# Patient Record
Sex: FEMALE | Race: WHITE | NOT HISPANIC OR LATINO | Employment: OTHER | URBAN - NONMETROPOLITAN AREA
[De-identification: names, ages, dates, MRNs, and addresses within clinical notes are randomized per-mention and may not be internally consistent; named-entity substitution may affect disease eponyms.]

---

## 2022-02-24 ENCOUNTER — OFFICE VISIT (OUTPATIENT)
Dept: OBSTETRICS AND GYNECOLOGY | Facility: CLINIC | Age: 62
End: 2022-02-24

## 2022-02-24 VITALS
BODY MASS INDEX: 37.65 KG/M2 | DIASTOLIC BLOOD PRESSURE: 80 MMHG | SYSTOLIC BLOOD PRESSURE: 130 MMHG | HEIGHT: 65 IN | WEIGHT: 226 LBS

## 2022-02-24 DIAGNOSIS — Z12.31 ENCOUNTER FOR SCREENING MAMMOGRAM FOR MALIGNANT NEOPLASM OF BREAST: ICD-10-CM

## 2022-02-24 DIAGNOSIS — N95.0 PMB (POSTMENOPAUSAL BLEEDING): Primary | ICD-10-CM

## 2022-02-24 PROCEDURE — 99203 OFFICE O/P NEW LOW 30 MIN: CPT | Performed by: NURSE PRACTITIONER

## 2022-02-24 PROCEDURE — 88305 TISSUE EXAM BY PATHOLOGIST: CPT | Performed by: NURSE PRACTITIONER

## 2022-02-24 PROCEDURE — 58100 BIOPSY OF UTERUS LINING: CPT | Performed by: NURSE PRACTITIONER

## 2022-03-01 ENCOUNTER — HOSPITAL ENCOUNTER (OUTPATIENT)
Dept: MAMMOGRAPHY | Facility: HOSPITAL | Age: 62
Discharge: HOME | End: 2022-03-01
Admitting: NURSE PRACTITIONER

## 2022-03-01 DIAGNOSIS — Z12.31 ENCOUNTER FOR SCREENING MAMMOGRAM FOR MALIGNANT NEOPLASM OF BREAST: ICD-10-CM

## 2022-03-01 PROCEDURE — 77067 SCR MAMMO BI INCL CAD: CPT

## 2022-03-01 PROCEDURE — 77063 BREAST TOMOSYNTHESIS BI: CPT

## 2022-03-02 ENCOUNTER — DOCUMENTATION (OUTPATIENT)
Dept: LAB | Facility: HOSPITAL | Age: 62
End: 2022-03-02

## 2022-03-02 DIAGNOSIS — Z80.3 FAMILY HISTORY OF BREAST CANCER IN MALE: Primary | ICD-10-CM

## 2022-03-02 NOTE — PROGRESS NOTES
As part of a high-risk assessment, this patient was provided a questionnaire to assess personal and family history of cancer. Patients who meet National Comprehensive Cancer Network criteria are offered genetic testing for hereditary cancer at their appointment. If this patient qualifies and consents to genetic testing, the results and management recommendations (when applicable) will be provided to the referring provider. A [facility] breast cancer risk assessment was also calculated.  Based upon the patient's answers, the TC score was calcuated as  13.5% . Women with a 20% or higher lifetime risk are recommended to pursue annual breast MRI in addition to annual mammogram, per American Cancer Society recommendations.

## 2022-03-05 LAB
CYTO UR: NORMAL
LAB AP CASE REPORT: NORMAL
LAB AP CLINICAL INFORMATION: NORMAL
PATH REPORT.FINAL DX SPEC: NORMAL
PATH REPORT.GROSS SPEC: NORMAL

## 2022-03-07 ENCOUNTER — LAB (OUTPATIENT)
Dept: LAB | Facility: HOSPITAL | Age: 62
End: 2022-03-07

## 2022-03-07 ENCOUNTER — TELEPHONE (OUTPATIENT)
Dept: OBSTETRICS AND GYNECOLOGY | Facility: CLINIC | Age: 62
End: 2022-03-07

## 2022-03-07 DIAGNOSIS — Z80.3 FAMILY HISTORY OF BREAST CANCER IN MALE: ICD-10-CM

## 2022-03-09 DIAGNOSIS — C54.1 ENDOMETRIAL CARCINOMA: Primary | ICD-10-CM

## 2022-03-14 ENCOUNTER — TELEPHONE (OUTPATIENT)
Dept: GYNECOLOGIC ONCOLOGY | Age: 62
End: 2022-03-14

## 2022-03-14 NOTE — TELEPHONE ENCOUNTER
Caller: ISAIAH JOHNSON    Relationship to patient: SELF    Best call back number: [phone]    Chief complaint: PT STATES SHE HAS A THREE HOUR DRIVE TO THE OFFICE, AND ASKS IF IT IS POSSIBLE THAT HER APPT BE DONE AS A VIRTUAL VISIT    Type of visit: NEW PT APPT    Requested date: SAME DATE 03/16 AS VIRTUAL VISIT    If rescheduling, when is the original appointment: 03/16

## 2022-03-16 ENCOUNTER — TELEMEDICINE (OUTPATIENT)
Dept: GYNECOLOGIC ONCOLOGY | Facility: CLINIC | Age: 62
End: 2022-03-16

## 2022-03-16 ENCOUNTER — DOCUMENTATION (OUTPATIENT)
Dept: GYNECOLOGIC ONCOLOGY | Facility: CLINIC | Age: 62
End: 2022-03-16

## 2022-03-16 ENCOUNTER — PREP FOR SURGERY (OUTPATIENT)
Dept: OTHER | Facility: HOSPITAL | Age: 62
End: 2022-03-16

## 2022-03-16 VITALS — HEIGHT: 65 IN | WEIGHT: 220 LBS | BODY MASS INDEX: 36.65 KG/M2

## 2022-03-16 DIAGNOSIS — C55 MALIGNANT NEOPLASM OF UTERUS, UNSPECIFIED SITE: Primary | ICD-10-CM

## 2022-03-16 DIAGNOSIS — C54.1 ENDOMETRIAL CARCINOMA: Primary | ICD-10-CM

## 2022-03-16 PROCEDURE — 99442 PR PHYS/QHP TELEPHONE EVALUATION 11-20 MIN: CPT | Performed by: OBSTETRICS & GYNECOLOGY

## 2022-03-16 RX ORDER — SODIUM CHLORIDE 0.9 % (FLUSH) 0.9 %
10 SYRINGE (ML) INJECTION EVERY 12 HOURS SCHEDULED
Status: CANCELLED | OUTPATIENT
Start: 2022-03-16

## 2022-03-16 RX ORDER — SODIUM CHLORIDE 0.9 % (FLUSH) 0.9 %
10 SYRINGE (ML) INJECTION AS NEEDED
Status: CANCELLED | OUTPATIENT
Start: 2022-03-16

## 2022-03-16 RX ORDER — CEFAZOLIN SODIUM 2 G/100ML
2 INJECTION, SOLUTION INTRAVENOUS ONCE
Status: CANCELLED | OUTPATIENT
Start: 2022-03-16 | End: 2022-03-16

## 2022-03-16 RX ORDER — ONDANSETRON 2 MG/ML
4 INJECTION INTRAMUSCULAR; INTRAVENOUS EVERY 6 HOURS PRN
Status: CANCELLED | OUTPATIENT
Start: 2022-03-16

## 2022-03-16 RX ORDER — SODIUM CHLORIDE, SODIUM LACTATE, POTASSIUM CHLORIDE, CALCIUM CHLORIDE 600; 310; 30; 20 MG/100ML; MG/100ML; MG/100ML; MG/100ML
100 INJECTION, SOLUTION INTRAVENOUS CONTINUOUS
Status: CANCELLED | OUTPATIENT
Start: 2022-03-16

## 2022-03-16 NOTE — PROGRESS NOTES
*TELEHEALTH VISIT *     Patient consented to telephone visit for medical care today.   Total time spent reviewing images, labs, discussion and plan was 15 minutes.       Adela Jay D.O  3/16/2022                    Age: 61 y.o.  Sex: female  :  1960  MRN: [id number]     REFERRING PHYSICIAN: DANIEL Salmeron  DATE OF VISIT: 3/16/2022     Rachel Sierra presents to [facility] for evaluation and management of newly diagnosed high grade papillary serous uterine carcinoma. She was having PMB and Maday Morales obtained EMBx for the diagnosis. She denies constitutional symptoms.       Oncology/Hematology History Overview Note   Rachel Sierra is a 61 y.o. female referred by DANIEL Cutler ([place]) for endometrial carcinoma.     • 22: TVUS-uterus-10 cm length, 3.9 cm fundal fibroid, ET-27 mm, ovaries normal.  • 22: EMB-high-grade carcinoma w/ papillary features compatible with endometrial serous carcinoma.            Past Medical History:  No past medical history on file.    Past Surgical History:  Past Surgical History:   Procedure Laterality Date   • BREAST BIOPSY Left     lipoma   • CHOLECYSTECTOMY     • ENDOMETRIAL BIOPSY  2015   • TONSILLECTOMY          MEDICATIONS:    Current Outpatient Medications:   •  cyanocobalamin (VITAMIN B-12) 1000 MCG tablet, Take 1,000 mcg by mouth., Disp: , Rfl:     ALLERGIES:  Allergies   Allergen Reactions   • Honey Anaphylaxis     Anaphylaxis as a child - edema at lips as adult.   • Sulfa Antibiotics Rash and Swelling     Edema throat, hands and face -  Hospitalized for 2 days.   • Bupropion Hives   • Nuts Other (See Comments)     Blood in stools         ROS:  CONSTITUTIONAL:  Denies fever or chills.   NEUROLOGIC:  Denies headache, focal weakness or sensory changes.   EYES:  Denies change in visual acuity.  HEENT:  Denies nasal congestion or sore throat.   RESPIRATORY:  Denies cough or shortness of breath.   CARDIOVASCULAR:   Denies chest pain or edema.   GI:  Denies abdominal pain, nausea, vomiting, bloody stools or diarrhea.   :  Denies dysuria, leaking or incontinence.  MUSCULOSKELETAL:  Denies back pain or joint pain.   INTEGUMENT:  Denies rash.   ENDOCRINE:  Denies polyuria or polydipsia.   LYMPHATIC:  Denies swollen glands or lymphedema.   PSYCHIATRIC:  Denies depression or anxiety.      PHYSICAL EXAM:  There were no vitals filed for this visit.  There is no height or weight on file to calculate BMI.  No flowsheet data found.  PHQ-9 Total Score:           Result Review :  The pertinent labs, images, and/or pathology as noted in the oncology history were reviewed independently and discussed with the patient.   Adela Jay, DO   03/16/2022    [facility] LABS:   No results found for: WBC, RBC, HGB, HCT, PLT  No results found for:     [facility] IMAGING:  Mammo Screening Digital Tomosynthesis Bilateral With CAD    Result Date: 3/10/2022  Additional evaluation of both breasts is recommended with focal spot compression views and true lateral views as detailed above. The images are marked electronically. BI-RADS Category 0, incomplete.  Tyrer-Cuzick lifetime risk score of 13.5 percent. NCCN guidelines for genetic testing is met. American Cancer Society guidelines recommend screening breast MRI for women with estimated lifetime risk of breast cancer greater than 20%. This report was finalized on 03/10/2022 16:33 by Dr. Cristian Medellin MD.            ASSESSMENT :  • UPSC on EMBX 2/24/22          PLAN :    The case was reviewed with the patient in detail, taking into consideration symptoms, laboratory results, imaging, pathology and physical exam findings.  At this point in time, I am recommending Robotic assisted total laparoscopic hysterectomy, bilateral salpingoophorecotmy, sentinel lymph node mapping, possible staging, possible laparotomy.   .     Risks, benefits, alternatives and indications to the procedure were reviewed in  detail.    Risks of surgery include bleeding/hemorrhage which may require transfusion and associated transfusion risk (transfusion reaction, HCV, TRALI ); Infection, which may require antibiotic therapy or abscess drainage; Damage to surrounding internal organs (bowel, bladder, or urinary tract) which may result in obstruction or fistula; Nerve, or vascular injury which may result in numbness, pain, swelling or loss of strength. Risk of possible incomplete resolution of symptoms or failure to cure.  She understands that it is possible that intraoperative findings may warrant further treatment.  There is a low, but real, risk of unanticipated return to the OR for a problem associated with the surgery and a remote possibility of death.      • All questions were addressed and answered to the patient's satisfaction. She indicates understanding of these risks and desires to proceed. She wishes me to use my judgement to do the procedure that I feel is in her best interest. Surgical consents were signed.  •             Adela Jay D.O  3/16/2022    Gynecologic Oncology   [address] [place] [address]  [phone] office

## 2022-03-17 ENCOUNTER — HOSPITAL ENCOUNTER (OUTPATIENT)
Dept: MAMMOGRAPHY | Facility: HOSPITAL | Age: 62
Discharge: HOME | End: 2022-03-17

## 2022-03-17 ENCOUNTER — HOSPITAL ENCOUNTER (OUTPATIENT)
Dept: ULTRASOUND IMAGING | Facility: HOSPITAL | Age: 62
Discharge: HOME | End: 2022-03-17

## 2022-03-17 DIAGNOSIS — R92.8 ABNORMAL MAMMOGRAM: ICD-10-CM

## 2022-03-17 PROCEDURE — 76642 ULTRASOUND BREAST LIMITED: CPT

## 2022-03-17 PROCEDURE — 77066 DX MAMMO INCL CAD BI: CPT

## 2022-03-17 PROCEDURE — G0279 TOMOSYNTHESIS, MAMMO: HCPCS

## 2022-03-18 PROBLEM — C54.1 ENDOMETRIAL CARCINOMA: Status: ACTIVE | Noted: 2022-03-18

## 2022-03-24 ENCOUNTER — DOCUMENTATION (OUTPATIENT)
Dept: LAB | Facility: HOSPITAL | Age: 62
End: 2022-03-24

## 2022-03-24 ENCOUNTER — TELEPHONE (OUTPATIENT)
Dept: GYNECOLOGIC ONCOLOGY | Facility: CLINIC | Age: 62
End: 2022-03-24

## 2022-03-24 NOTE — TELEPHONE ENCOUNTER
CALLED PATIENT AND TOLD HER THAT I SCHEDULED ALL HER PAT IN [place] BUT PATIENT INFORMED ME THAT SHE WOULD BE STAYING IN [place] THE NIGHT BEFORE SURGERY SO SHE COULD HAVE EVERYTHING DONE HERE SINCE SHE WILL BE HERE ANYWAY SO I CALLED AND HAD IT SCHEDULED HERE IN [place] THE DAY BEFORE SURGERY. PATIENT CONFIRMED AND WAS VERY HAPPY TO HAVE EVERYTHING DONE HERE BEFORE SURGERY.

## 2022-04-05 ENCOUNTER — APPOINTMENT (OUTPATIENT)
Dept: PREADMISSION TESTING | Facility: HOSPITAL | Age: 62
End: 2022-04-05

## 2022-04-06 ENCOUNTER — HOSPITAL ENCOUNTER (OUTPATIENT)
Dept: GENERAL RADIOLOGY | Facility: HOSPITAL | Age: 62
Discharge: HOME | End: 2022-04-06

## 2022-04-06 ENCOUNTER — PRE-ADMISSION TESTING (OUTPATIENT)
Dept: PREADMISSION TESTING | Facility: HOSPITAL | Age: 62
End: 2022-04-06

## 2022-04-06 VITALS
HEIGHT: 65 IN | TEMPERATURE: 98.1 F | OXYGEN SATURATION: 98 % | DIASTOLIC BLOOD PRESSURE: 76 MMHG | BODY MASS INDEX: 37.32 KG/M2 | RESPIRATION RATE: 16 BRPM | WEIGHT: 224 LBS | SYSTOLIC BLOOD PRESSURE: 144 MMHG | HEART RATE: 64 BPM

## 2022-04-06 DIAGNOSIS — C54.1 ENDOMETRIAL CARCINOMA: ICD-10-CM

## 2022-04-06 LAB
ABO GROUP BLD: NORMAL
ANION GAP SERPL CALCULATED.3IONS-SCNC: 8 MMOL/L (ref 5–15)
APTT PPP: 32.8 SECONDS (ref 22.7–35.4)
B-HCG UR QL: NEGATIVE
BACTERIA UR QL AUTO: ABNORMAL /HPF
BASOPHILS # BLD AUTO: 0.09 10*3/MM3 (ref 0–0.2)
BASOPHILS NFR BLD AUTO: 1.2 % (ref 0–1.5)
BILIRUB UR QL STRIP: NEGATIVE
BLD GP AB SCN SERPL QL: NEGATIVE
BUN SERPL-MCNC: 16 MG/DL (ref 8–23)
BUN/CREAT SERPL: 19.3 (ref 7–25)
CALCIUM SPEC-SCNC: 9.7 MG/DL (ref 8.6–10.5)
CHLORIDE SERPL-SCNC: 105 MMOL/L (ref 98–107)
CLARITY UR: CLEAR
CO2 SERPL-SCNC: 28 MMOL/L (ref 22–29)
COLOR UR: YELLOW
CREAT SERPL-MCNC: 0.83 MG/DL (ref 0.57–1)
DEPRECATED RDW RBC AUTO: 42.7 FL (ref 37–54)
EGFRCR SERPLBLD CKD-EPI 2021: 79.3 ML/MIN/1.73
EOSINOPHIL # BLD AUTO: 0.41 10*3/MM3 (ref 0–0.4)
EOSINOPHIL NFR BLD AUTO: 5.7 % (ref 0.3–6.2)
ERYTHROCYTE [DISTWIDTH] IN BLOOD BY AUTOMATED COUNT: 13.6 % (ref 12.3–15.4)
GLUCOSE SERPL-MCNC: 81 MG/DL (ref 65–99)
GLUCOSE UR STRIP-MCNC: NEGATIVE MG/DL
HCT VFR BLD AUTO: 46.3 % (ref 34–46.6)
HGB BLD-MCNC: 15.4 G/DL (ref 12–15.9)
HGB UR QL STRIP.AUTO: ABNORMAL
HYALINE CASTS UR QL AUTO: ABNORMAL /LPF
IMM GRANULOCYTES # BLD AUTO: 0.02 10*3/MM3 (ref 0–0.05)
IMM GRANULOCYTES NFR BLD AUTO: 0.3 % (ref 0–0.5)
INR PPP: 1 (ref 0.9–1.1)
KETONES UR QL STRIP: NEGATIVE
LEUKOCYTE ESTERASE UR QL STRIP.AUTO: ABNORMAL
LYMPHOCYTES # BLD AUTO: 2.06 10*3/MM3 (ref 0.7–3.1)
LYMPHOCYTES NFR BLD AUTO: 28.5 % (ref 19.6–45.3)
MCH RBC QN AUTO: 28.4 PG (ref 26.6–33)
MCHC RBC AUTO-ENTMCNC: 33.3 G/DL (ref 31.5–35.7)
MCV RBC AUTO: 85.4 FL (ref 79–97)
MONOCYTES # BLD AUTO: 0.57 10*3/MM3 (ref 0.1–0.9)
MONOCYTES NFR BLD AUTO: 7.9 % (ref 5–12)
NEUTROPHILS NFR BLD AUTO: 4.09 10*3/MM3 (ref 1.7–7)
NEUTROPHILS NFR BLD AUTO: 56.4 % (ref 42.7–76)
NITRITE UR QL STRIP: NEGATIVE
NRBC BLD AUTO-RTO: 0 /100 WBC (ref 0–0.2)
PH UR STRIP.AUTO: 7 [PH] (ref 5–8)
PLATELET # BLD AUTO: 303 10*3/MM3 (ref 140–450)
PMV BLD AUTO: 9.8 FL (ref 6–12)
POTASSIUM SERPL-SCNC: 4.6 MMOL/L (ref 3.5–5.2)
PROT UR QL STRIP: NEGATIVE
PROTHROMBIN TIME: 13.1 SECONDS (ref 11.7–14.2)
QT INTERVAL: 417 MS
RBC # BLD AUTO: 5.42 10*6/MM3 (ref 3.77–5.28)
RBC # UR STRIP: ABNORMAL /HPF
REF LAB TEST METHOD: ABNORMAL
RH BLD: POSITIVE
SARS-COV-2 RNA RESP QL NAA+PROBE: NOT DETECTED
SODIUM SERPL-SCNC: 141 MMOL/L (ref 136–145)
SP GR UR STRIP: 1.01 (ref 1–1.03)
SQUAMOUS #/AREA URNS HPF: ABNORMAL /HPF
T&S EXPIRATION DATE: NORMAL
UROBILINOGEN UR QL STRIP: ABNORMAL
WBC # UR STRIP: ABNORMAL /HPF
WBC NRBC COR # BLD: 7.24 10*3/MM3 (ref 3.4–10.8)

## 2022-04-06 PROCEDURE — 71046 X-RAY EXAM CHEST 2 VIEWS: CPT

## 2022-04-06 PROCEDURE — 93010 ELECTROCARDIOGRAM REPORT: CPT | Performed by: INTERNAL MEDICINE

## 2022-04-06 PROCEDURE — 85730 THROMBOPLASTIN TIME PARTIAL: CPT

## 2022-04-06 PROCEDURE — 81025 URINE PREGNANCY TEST: CPT

## 2022-04-06 PROCEDURE — 86900 BLOOD TYPING SEROLOGIC ABO: CPT

## 2022-04-06 PROCEDURE — 81001 URINALYSIS AUTO W/SCOPE: CPT

## 2022-04-06 PROCEDURE — 93005 ELECTROCARDIOGRAM TRACING: CPT

## 2022-04-06 PROCEDURE — U0003 INFECTIOUS AGENT DETECTION BY NUCLEIC ACID (DNA OR RNA); SEVERE ACUTE RESPIRATORY SYNDROME CORONAVIRUS 2 (SARS-COV-2) (CORONAVIRUS DISEASE [COVID-19]), AMPLIFIED PROBE TECHNIQUE, MAKING USE OF HIGH THROUGHPUT TECHNOLOGIES AS DESCRIBED BY CMS-2020-01-R: HCPCS

## 2022-04-06 PROCEDURE — 85025 COMPLETE CBC W/AUTO DIFF WBC: CPT

## 2022-04-06 PROCEDURE — 80048 BASIC METABOLIC PNL TOTAL CA: CPT

## 2022-04-06 PROCEDURE — 86850 RBC ANTIBODY SCREEN: CPT

## 2022-04-06 PROCEDURE — 86901 BLOOD TYPING SEROLOGIC RH(D): CPT

## 2022-04-06 PROCEDURE — 85610 PROTHROMBIN TIME: CPT

## 2022-04-06 PROCEDURE — 36415 COLL VENOUS BLD VENIPUNCTURE: CPT

## 2022-04-06 PROCEDURE — C9803 HOPD COVID-19 SPEC COLLECT: HCPCS

## 2022-04-06 RX ORDER — FERROUS SULFATE 325(65) MG
325 TABLET ORAL
COMMUNITY
End: 2022-04-27 | Stop reason: ALTCHOICE

## 2022-04-06 NOTE — DISCHARGE INSTRUCTIONS
Take the following medications the morning of surgery: NONE    ARRIVE AT 9:30 AM.      If you are on prescription narcotic pain medication to control your pain you may also take that medication the morning of surgery.    General Instructions:  Do not eat solid food after midnight the night before surgery.  You may drink clear liquids day of surgery but must stop at least one hour before your hospital arrival time. CUTOFF TIME IS 8:30 AM.  It is beneficial for you to have a clear drink that contains carbohydrates the day of surgery.  We suggest a 12 to 20 ounce bottle of Gatorade or Powerade for non-diabetic patients or a 12 to 20 ounce bottle of G2 or Powerade Zero for diabetic patients. (Pediatric patients, are not advised to drink a 12 to 20 ounce carbohydrate drink)    Clear liquids are liquids you can see through.  Nothing red in color.     Plain water                               Sports drinks  Sodas                                   Gelatin (Jell-O)  Fruit juices without pulp such as white grape juice and apple juice  Popsicles that contain no fruit or yogurt  Tea or coffee (no cream or milk added)  Gatorade / Powerade  G2 / Powerade Zero    Patients who avoid smoking, chewing tobacco and alcohol for 4 weeks prior to surgery have a reduced risk of post-operative complications.  Quit smoking as many days before surgery as you can.  Do not smoke, use chewing tobacco or drink alcohol the day of surgery.   If applicable bring your C-PAP/ BI-PAP machine.  Bring any papers given to you in the doctor’s office.  Wear clean comfortable clothes.  Do not wear contact lenses, false eyelashes or make-up.  Bring a case for your glasses.   Bring crutches or walker if applicable.  Remove all piercings.  Leave jewelry and any other valuables at home.  Hair extensions with metal clips must be removed prior to surgery.  The Pre-Admission Testing nurse will instruct you to bring medications if unable to obtain an accurate list  in Pre-Admission Testing.        If you were given a blood bank ID arm band remember to bring it with you the day of surgery.    Preventing a Surgical Site Infection:  For 2 to 3 days before surgery, avoid shaving with a razor because the razor can irritate skin and make it easier to develop an infection.    Any areas of open skin can increase the risk of a post-operative wound infection by allowing bacteria to enter and travel throughout the body.  Notify your surgeon if you have any skin wounds / rashes even if it is not near the expected surgical site.  The area will need assessed to determine if surgery should be delayed until it is healed.  The night prior to surgery shower using a fresh bar of anti-bacterial soap (such as Dial) and clean washcloth.  Sleep in a clean bed with clean clothing.  Do not allow pets to sleep with you.  Shower on the morning of surgery using a fresh bar of anti-bacterial soap (such as Dial) and clean washcloth.  Dry with a clean towel and dress in clean clothing.  Ask your surgeon if you will be receiving antibiotics prior to surgery.  Make sure you, your family, and all healthcare providers clean their hands with soap and water or an alcohol based hand  before caring for you or your wound.    Day of surgery:  Your arrival time is approximately two hours before your scheduled surgery time.  Upon arrival, a Pre-op nurse and Anesthesiologist will review your health history, obtain vital signs, and answer questions you may have.  The only belongings needed at this time will be a list of your home medications and if applicable your C-PAP/BI-PAP machine.  A Pre-op nurse will start an IV and you may receive medication in preparation for surgery, including something to help you relax.     Please be aware that surgery does come with discomfort.  We want to make every effort to control your discomfort so please discuss any uncontrolled symptoms with your nurse.   Your doctor will most  likely have prescribed pain medications.      If you are going home after surgery you will receive individualized written care instructions before being discharged.  A responsible adult must drive you to and from the hospital on the day of your surgery and stay with you for 24 hours.  Discharge prescriptions can be filled by the hospital pharmacy during regular pharmacy hours.  If you are having surgery late in the day/evening your prescription may be e-prescribed to your pharmacy.  Please verify your pharmacy hours or chose a 24 hour pharmacy to avoid not having access to your prescription because your pharmacy has closed for the day.    If you are staying overnight following surgery, you will be transported to your hospital room following the recovery period.  [facility] has all private rooms.    If you have any questions please call Pre-Admission Testing at [phone].  Deductibles and co-payments are collected on the day of service. Please be prepared to pay the required co-pay, deductible or deposit on the day of service as defined by your plan.    Patient Education for Self-Quarantine Process    Following your COVID testing, we strongly recommend that you wear a mask when you are with other people and practice social distancing.   Limit your activities to only required outings.  Wash your hands with soap and water frequently for at least 20 seconds.   Avoid touching your eyes, nose and mouth with unwashed hands.  Do not share anything - utensils, drinking glasses, food from the same bowl.   Sanitize household surfaces daily. Include all high touch areas (door handles, light switches, phones, countertops, etc.)    Call your surgeon immediately if you experience any of the following symptoms:  Sore Throat  Shortness of Breath or difficulty breathing  Cough  Chills  Body soreness or muscle pain  Headache  Fever  New loss of taste or smell  Do not arrive for your surgery ill.  Your procedure will  need to be rescheduled to another time.  You will need to call your physician before the day of surgery to avoid any unnecessary exposure to hospital staff as well as other patients.

## 2022-04-07 ENCOUNTER — HOSPITAL ENCOUNTER (OUTPATIENT)
Facility: HOSPITAL | Age: 62
Discharge: HOME | End: 2022-04-08
Attending: OBSTETRICS & GYNECOLOGY | Admitting: OBSTETRICS & GYNECOLOGY

## 2022-04-07 ENCOUNTER — ANESTHESIA (OUTPATIENT)
Dept: PERIOP | Facility: HOSPITAL | Age: 62
End: 2022-04-07

## 2022-04-07 ENCOUNTER — ANESTHESIA EVENT (OUTPATIENT)
Dept: PERIOP | Facility: HOSPITAL | Age: 62
End: 2022-04-07

## 2022-04-07 DIAGNOSIS — C54.1 ENDOMETRIAL CARCINOMA: ICD-10-CM

## 2022-04-07 PROCEDURE — G0378 HOSPITAL OBSERVATION PER HR: HCPCS

## 2022-04-07 PROCEDURE — 25010000002 MIDAZOLAM PER 1 MG: Performed by: ANESTHESIOLOGY

## 2022-04-07 PROCEDURE — C1894 INTRO/SHEATH, NON-LASER: HCPCS | Performed by: OBSTETRICS & GYNECOLOGY

## 2022-04-07 PROCEDURE — 25010000002 FENTANYL CITRATE (PF) 50 MCG/ML SOLUTION: Performed by: REGISTERED NURSE

## 2022-04-07 PROCEDURE — 25010000002 PROPOFOL 10 MG/ML EMULSION: Performed by: REGISTERED NURSE

## 2022-04-07 PROCEDURE — 38900 IO MAP OF SENT LYMPH NODE: CPT | Performed by: OBSTETRICS & GYNECOLOGY

## 2022-04-07 PROCEDURE — 57200 REPAIR OF VAGINA: CPT | Performed by: OBSTETRICS & GYNECOLOGY

## 2022-04-07 PROCEDURE — 58571 TLH W/T/O 250 G OR LESS: CPT | Performed by: OBSTETRICS & GYNECOLOGY

## 2022-04-07 PROCEDURE — 25010000002 CEFAZOLIN IN DEXTROSE 2-4 GM/100ML-% SOLUTION: Performed by: OBSTETRICS & GYNECOLOGY

## 2022-04-07 PROCEDURE — 25010000002 HYDROMORPHONE PER 4 MG: Performed by: REGISTERED NURSE

## 2022-04-07 PROCEDURE — 25010000002 KETOROLAC TROMETHAMINE PER 15 MG: Performed by: OBSTETRICS & GYNECOLOGY

## 2022-04-07 PROCEDURE — 38571 LAPAROSCOPY LYMPHADENECTOMY: CPT | Performed by: OBSTETRICS & GYNECOLOGY

## 2022-04-07 PROCEDURE — 25010000002 HYDROMORPHONE PER 4 MG: Performed by: ANESTHESIOLOGY

## 2022-04-07 PROCEDURE — 25010000002 ONDANSETRON PER 1 MG: Performed by: REGISTERED NURSE

## 2022-04-07 PROCEDURE — 25010000002 DEXAMETHASONE PER 1 MG: Performed by: REGISTERED NURSE

## 2022-04-07 PROCEDURE — C1889 IMPLANT/INSERT DEVICE, NOC: HCPCS | Performed by: OBSTETRICS & GYNECOLOGY

## 2022-04-07 DEVICE — SEALANT WND FIBRIN TISSEEL PREFIL/SYR/PRIMAFZ 10ML: Type: IMPLANTABLE DEVICE | Site: ABDOMEN | Status: FUNCTIONAL

## 2022-04-07 RX ORDER — SODIUM CHLORIDE 0.9 % (FLUSH) 0.9 %
10 SYRINGE (ML) INJECTION AS NEEDED
Status: DISCONTINUED | OUTPATIENT
Start: 2022-04-07 | End: 2022-04-07 | Stop reason: HOSPADM

## 2022-04-07 RX ORDER — FAMOTIDINE 20 MG/1
20 TABLET, FILM COATED ORAL DAILY
Status: DISCONTINUED | OUTPATIENT
Start: 2022-04-07 | End: 2022-04-08 | Stop reason: HOSPADM

## 2022-04-07 RX ORDER — NALOXONE HCL 0.4 MG/ML
0.1 VIAL (ML) INJECTION
Status: DISCONTINUED | OUTPATIENT
Start: 2022-04-07 | End: 2022-04-08 | Stop reason: HOSPADM

## 2022-04-07 RX ORDER — SODIUM CHLORIDE 9 MG/ML
INJECTION, SOLUTION INTRAVENOUS AS NEEDED
Status: DISCONTINUED | OUTPATIENT
Start: 2022-04-07 | End: 2022-04-07 | Stop reason: HOSPADM

## 2022-04-07 RX ORDER — IBUPROFEN 600 MG/1
600 TABLET ORAL ONCE AS NEEDED
Status: DISCONTINUED | OUTPATIENT
Start: 2022-04-07 | End: 2022-04-07 | Stop reason: HOSPADM

## 2022-04-07 RX ORDER — SODIUM CHLORIDE 0.9 % (FLUSH) 0.9 %
3-10 SYRINGE (ML) INJECTION AS NEEDED
Status: DISCONTINUED | OUTPATIENT
Start: 2022-04-07 | End: 2022-04-07 | Stop reason: HOSPADM

## 2022-04-07 RX ORDER — SIMETHICONE 80 MG
120 TABLET,CHEWABLE ORAL
Status: DISCONTINUED | OUTPATIENT
Start: 2022-04-07 | End: 2022-04-08 | Stop reason: HOSPADM

## 2022-04-07 RX ORDER — DIPHENHYDRAMINE HYDROCHLORIDE 50 MG/ML
25 INJECTION INTRAMUSCULAR; INTRAVENOUS NIGHTLY PRN
Status: DISCONTINUED | OUTPATIENT
Start: 2022-04-07 | End: 2022-04-08 | Stop reason: HOSPADM

## 2022-04-07 RX ORDER — DIPHENHYDRAMINE HCL 25 MG
25 CAPSULE ORAL NIGHTLY PRN
Status: DISCONTINUED | OUTPATIENT
Start: 2022-04-07 | End: 2022-04-08 | Stop reason: HOSPADM

## 2022-04-07 RX ORDER — ONDANSETRON 2 MG/ML
4 INJECTION INTRAMUSCULAR; INTRAVENOUS EVERY 6 HOURS PRN
Status: DISCONTINUED | OUTPATIENT
Start: 2022-04-07 | End: 2022-04-08 | Stop reason: HOSPADM

## 2022-04-07 RX ORDER — KETAMINE HYDROCHLORIDE 10 MG/ML
INJECTION INTRAMUSCULAR; INTRAVENOUS AS NEEDED
Status: DISCONTINUED | OUTPATIENT
Start: 2022-04-07 | End: 2022-04-07 | Stop reason: SURG

## 2022-04-07 RX ORDER — HYDROMORPHONE HYDROCHLORIDE 1 MG/ML
0.5 INJECTION, SOLUTION INTRAMUSCULAR; INTRAVENOUS; SUBCUTANEOUS
Status: DISCONTINUED | OUTPATIENT
Start: 2022-04-07 | End: 2022-04-08 | Stop reason: HOSPADM

## 2022-04-07 RX ORDER — CALCIUM CARBONATE 200(500)MG
2 TABLET,CHEWABLE ORAL 3 TIMES DAILY PRN
Status: DISCONTINUED | OUTPATIENT
Start: 2022-04-07 | End: 2022-04-08 | Stop reason: HOSPADM

## 2022-04-07 RX ORDER — HYDRALAZINE HYDROCHLORIDE 20 MG/ML
5 INJECTION INTRAMUSCULAR; INTRAVENOUS
Status: DISCONTINUED | OUTPATIENT
Start: 2022-04-07 | End: 2022-04-07 | Stop reason: HOSPADM

## 2022-04-07 RX ORDER — PROMETHAZINE HYDROCHLORIDE 25 MG/1
25 SUPPOSITORY RECTAL ONCE AS NEEDED
Status: DISCONTINUED | OUTPATIENT
Start: 2022-04-07 | End: 2022-04-07 | Stop reason: HOSPADM

## 2022-04-07 RX ORDER — MIDAZOLAM HYDROCHLORIDE 1 MG/ML
1 INJECTION INTRAMUSCULAR; INTRAVENOUS
Status: COMPLETED | OUTPATIENT
Start: 2022-04-07 | End: 2022-04-07

## 2022-04-07 RX ORDER — LIDOCAINE HYDROCHLORIDE 10 MG/ML
0.5 INJECTION, SOLUTION EPIDURAL; INFILTRATION; INTRACAUDAL; PERINEURAL ONCE AS NEEDED
Status: DISCONTINUED | OUTPATIENT
Start: 2022-04-07 | End: 2022-04-07 | Stop reason: HOSPADM

## 2022-04-07 RX ORDER — ONDANSETRON 4 MG/1
4 TABLET, FILM COATED ORAL EVERY 6 HOURS PRN
Status: DISCONTINUED | OUTPATIENT
Start: 2022-04-07 | End: 2022-04-08 | Stop reason: HOSPADM

## 2022-04-07 RX ORDER — DEXAMETHASONE SODIUM PHOSPHATE 4 MG/ML
INJECTION, SOLUTION INTRA-ARTICULAR; INTRALESIONAL; INTRAMUSCULAR; INTRAVENOUS; SOFT TISSUE AS NEEDED
Status: DISCONTINUED | OUTPATIENT
Start: 2022-04-07 | End: 2022-04-07 | Stop reason: SURG

## 2022-04-07 RX ORDER — ROCURONIUM BROMIDE 10 MG/ML
INJECTION, SOLUTION INTRAVENOUS AS NEEDED
Status: DISCONTINUED | OUTPATIENT
Start: 2022-04-07 | End: 2022-04-07 | Stop reason: SURG

## 2022-04-07 RX ORDER — HYDROCODONE BITARTRATE AND ACETAMINOPHEN 5; 325 MG/1; MG/1
1 TABLET ORAL EVERY 4 HOURS PRN
Status: DISCONTINUED | OUTPATIENT
Start: 2022-04-07 | End: 2022-04-08 | Stop reason: HOSPADM

## 2022-04-07 RX ORDER — DIPHENHYDRAMINE HYDROCHLORIDE 50 MG/ML
12.5 INJECTION INTRAMUSCULAR; INTRAVENOUS
Status: DISCONTINUED | OUTPATIENT
Start: 2022-04-07 | End: 2022-04-07 | Stop reason: HOSPADM

## 2022-04-07 RX ORDER — FENTANYL CITRATE 50 UG/ML
50 INJECTION, SOLUTION INTRAMUSCULAR; INTRAVENOUS
Status: DISCONTINUED | OUTPATIENT
Start: 2022-04-07 | End: 2022-04-07 | Stop reason: HOSPADM

## 2022-04-07 RX ORDER — SODIUM CHLORIDE 9 MG/ML
100 INJECTION, SOLUTION INTRAVENOUS CONTINUOUS
Status: DISCONTINUED | OUTPATIENT
Start: 2022-04-07 | End: 2022-04-08 | Stop reason: HOSPADM

## 2022-04-07 RX ORDER — SODIUM CHLORIDE, SODIUM LACTATE, POTASSIUM CHLORIDE, CALCIUM CHLORIDE 600; 310; 30; 20 MG/100ML; MG/100ML; MG/100ML; MG/100ML
9 INJECTION, SOLUTION INTRAVENOUS CONTINUOUS
Status: DISCONTINUED | OUTPATIENT
Start: 2022-04-07 | End: 2022-04-08 | Stop reason: HOSPADM

## 2022-04-07 RX ORDER — SCOLOPAMINE TRANSDERMAL SYSTEM 1 MG/1
1 PATCH, EXTENDED RELEASE TRANSDERMAL ONCE
Status: COMPLETED | OUTPATIENT
Start: 2022-04-07 | End: 2022-04-08

## 2022-04-07 RX ORDER — OXYCODONE AND ACETAMINOPHEN 7.5; 325 MG/1; MG/1
1 TABLET ORAL EVERY 4 HOURS PRN
Status: DISCONTINUED | OUTPATIENT
Start: 2022-04-07 | End: 2022-04-07 | Stop reason: HOSPADM

## 2022-04-07 RX ORDER — DOCUSATE SODIUM 100 MG/1
100 CAPSULE, LIQUID FILLED ORAL 2 TIMES DAILY PRN
Status: DISCONTINUED | OUTPATIENT
Start: 2022-04-07 | End: 2022-04-08 | Stop reason: HOSPADM

## 2022-04-07 RX ORDER — PROMETHAZINE HYDROCHLORIDE 25 MG/1
25 TABLET ORAL ONCE AS NEEDED
Status: DISCONTINUED | OUTPATIENT
Start: 2022-04-07 | End: 2022-04-07 | Stop reason: HOSPADM

## 2022-04-07 RX ORDER — DIPHENHYDRAMINE HCL 25 MG
25 CAPSULE ORAL
Status: DISCONTINUED | OUTPATIENT
Start: 2022-04-07 | End: 2022-04-07 | Stop reason: HOSPADM

## 2022-04-07 RX ORDER — LIDOCAINE HYDROCHLORIDE 20 MG/ML
INJECTION, SOLUTION INFILTRATION; PERINEURAL AS NEEDED
Status: DISCONTINUED | OUTPATIENT
Start: 2022-04-07 | End: 2022-04-07 | Stop reason: SURG

## 2022-04-07 RX ORDER — SODIUM CHLORIDE 0.9 % (FLUSH) 0.9 %
10 SYRINGE (ML) INJECTION EVERY 12 HOURS SCHEDULED
Status: DISCONTINUED | OUTPATIENT
Start: 2022-04-07 | End: 2022-04-07 | Stop reason: HOSPADM

## 2022-04-07 RX ORDER — HYDROMORPHONE HCL 110MG/55ML
PATIENT CONTROLLED ANALGESIA SYRINGE INTRAVENOUS AS NEEDED
Status: DISCONTINUED | OUTPATIENT
Start: 2022-04-07 | End: 2022-04-07 | Stop reason: SURG

## 2022-04-07 RX ORDER — FAMOTIDINE 10 MG/ML
20 INJECTION, SOLUTION INTRAVENOUS ONCE
Status: COMPLETED | OUTPATIENT
Start: 2022-04-07 | End: 2022-04-07

## 2022-04-07 RX ORDER — ONDANSETRON 2 MG/ML
INJECTION INTRAMUSCULAR; INTRAVENOUS AS NEEDED
Status: DISCONTINUED | OUTPATIENT
Start: 2022-04-07 | End: 2022-04-07 | Stop reason: SURG

## 2022-04-07 RX ORDER — FLUMAZENIL 0.1 MG/ML
0.2 INJECTION INTRAVENOUS AS NEEDED
Status: DISCONTINUED | OUTPATIENT
Start: 2022-04-07 | End: 2022-04-07 | Stop reason: HOSPADM

## 2022-04-07 RX ORDER — HYDROCODONE BITARTRATE AND ACETAMINOPHEN 7.5; 325 MG/1; MG/1
1 TABLET ORAL ONCE AS NEEDED
Status: COMPLETED | OUTPATIENT
Start: 2022-04-07 | End: 2022-04-07

## 2022-04-07 RX ORDER — CEFAZOLIN SODIUM 2 G/100ML
2 INJECTION, SOLUTION INTRAVENOUS ONCE
Status: COMPLETED | OUTPATIENT
Start: 2022-04-07 | End: 2022-04-07

## 2022-04-07 RX ORDER — ONDANSETRON 2 MG/ML
4 INJECTION INTRAMUSCULAR; INTRAVENOUS ONCE AS NEEDED
Status: DISCONTINUED | OUTPATIENT
Start: 2022-04-07 | End: 2022-04-07 | Stop reason: HOSPADM

## 2022-04-07 RX ORDER — BISACODYL 10 MG
10 SUPPOSITORY, RECTAL RECTAL DAILY PRN
Status: DISCONTINUED | OUTPATIENT
Start: 2022-04-07 | End: 2022-04-08 | Stop reason: HOSPADM

## 2022-04-07 RX ORDER — ACETAMINOPHEN 160 MG/5ML
650 SOLUTION ORAL EVERY 4 HOURS PRN
Status: DISCONTINUED | OUTPATIENT
Start: 2022-04-07 | End: 2022-04-08 | Stop reason: HOSPADM

## 2022-04-07 RX ORDER — SODIUM CHLORIDE, SODIUM LACTATE, POTASSIUM CHLORIDE, CALCIUM CHLORIDE 600; 310; 30; 20 MG/100ML; MG/100ML; MG/100ML; MG/100ML
100 INJECTION, SOLUTION INTRAVENOUS CONTINUOUS
Status: DISCONTINUED | OUTPATIENT
Start: 2022-04-07 | End: 2022-04-08 | Stop reason: HOSPADM

## 2022-04-07 RX ORDER — NALOXONE HCL 0.4 MG/ML
0.2 VIAL (ML) INJECTION AS NEEDED
Status: DISCONTINUED | OUTPATIENT
Start: 2022-04-07 | End: 2022-04-07 | Stop reason: HOSPADM

## 2022-04-07 RX ORDER — PROMETHAZINE HYDROCHLORIDE 12.5 MG/1
12.5 TABLET ORAL EVERY 6 HOURS PRN
Status: DISCONTINUED | OUTPATIENT
Start: 2022-04-07 | End: 2022-04-08 | Stop reason: HOSPADM

## 2022-04-07 RX ORDER — FENTANYL CITRATE 50 UG/ML
INJECTION, SOLUTION INTRAMUSCULAR; INTRAVENOUS AS NEEDED
Status: DISCONTINUED | OUTPATIENT
Start: 2022-04-07 | End: 2022-04-07 | Stop reason: SURG

## 2022-04-07 RX ORDER — EPHEDRINE SULFATE 50 MG/ML
5 INJECTION, SOLUTION INTRAVENOUS ONCE AS NEEDED
Status: DISCONTINUED | OUTPATIENT
Start: 2022-04-07 | End: 2022-04-07 | Stop reason: HOSPADM

## 2022-04-07 RX ORDER — ACETAMINOPHEN 325 MG/1
650 TABLET ORAL EVERY 4 HOURS PRN
Status: DISCONTINUED | OUTPATIENT
Start: 2022-04-07 | End: 2022-04-08 | Stop reason: HOSPADM

## 2022-04-07 RX ORDER — KETOROLAC TROMETHAMINE 30 MG/ML
30 INJECTION, SOLUTION INTRAMUSCULAR; INTRAVENOUS EVERY 8 HOURS
Status: DISCONTINUED | OUTPATIENT
Start: 2022-04-07 | End: 2022-04-08 | Stop reason: HOSPADM

## 2022-04-07 RX ORDER — LABETALOL HYDROCHLORIDE 5 MG/ML
5 INJECTION, SOLUTION INTRAVENOUS
Status: DISCONTINUED | OUTPATIENT
Start: 2022-04-07 | End: 2022-04-07 | Stop reason: HOSPADM

## 2022-04-07 RX ORDER — SODIUM CHLORIDE 0.9 % (FLUSH) 0.9 %
3 SYRINGE (ML) INJECTION EVERY 12 HOURS SCHEDULED
Status: DISCONTINUED | OUTPATIENT
Start: 2022-04-07 | End: 2022-04-07 | Stop reason: HOSPADM

## 2022-04-07 RX ORDER — ACETAMINOPHEN 500 MG
1000 TABLET ORAL ONCE
Status: COMPLETED | OUTPATIENT
Start: 2022-04-07 | End: 2022-04-07

## 2022-04-07 RX ORDER — GLYCOPYRROLATE 0.2 MG/ML
INJECTION INTRAMUSCULAR; INTRAVENOUS AS NEEDED
Status: DISCONTINUED | OUTPATIENT
Start: 2022-04-07 | End: 2022-04-07 | Stop reason: SURG

## 2022-04-07 RX ORDER — HYDROMORPHONE HYDROCHLORIDE 1 MG/ML
0.5 INJECTION, SOLUTION INTRAMUSCULAR; INTRAVENOUS; SUBCUTANEOUS
Status: DISCONTINUED | OUTPATIENT
Start: 2022-04-07 | End: 2022-04-07 | Stop reason: HOSPADM

## 2022-04-07 RX ORDER — PROMETHAZINE HYDROCHLORIDE 12.5 MG/1
12.5 SUPPOSITORY RECTAL EVERY 6 HOURS PRN
Status: DISCONTINUED | OUTPATIENT
Start: 2022-04-07 | End: 2022-04-08 | Stop reason: HOSPADM

## 2022-04-07 RX ORDER — INDOCYANINE GREEN AND WATER 25 MG
KIT INJECTION AS NEEDED
Status: DISCONTINUED | OUTPATIENT
Start: 2022-04-07 | End: 2022-04-07 | Stop reason: HOSPADM

## 2022-04-07 RX ORDER — NALOXONE HCL 0.4 MG/ML
0.4 VIAL (ML) INJECTION
Status: DISCONTINUED | OUTPATIENT
Start: 2022-04-07 | End: 2022-04-08 | Stop reason: HOSPADM

## 2022-04-07 RX ORDER — HYDROCODONE BITARTRATE AND ACETAMINOPHEN 10; 325 MG/1; MG/1
1 TABLET ORAL EVERY 4 HOURS PRN
Status: DISCONTINUED | OUTPATIENT
Start: 2022-04-07 | End: 2022-04-08 | Stop reason: HOSPADM

## 2022-04-07 RX ORDER — PROPOFOL 10 MG/ML
VIAL (ML) INTRAVENOUS AS NEEDED
Status: DISCONTINUED | OUTPATIENT
Start: 2022-04-07 | End: 2022-04-07 | Stop reason: SURG

## 2022-04-07 RX ORDER — MAGNESIUM HYDROXIDE 1200 MG/15ML
LIQUID ORAL AS NEEDED
Status: DISCONTINUED | OUTPATIENT
Start: 2022-04-07 | End: 2022-04-07 | Stop reason: HOSPADM

## 2022-04-07 RX ORDER — BUPIVACAINE HYDROCHLORIDE AND EPINEPHRINE 5; 5 MG/ML; UG/ML
INJECTION, SOLUTION EPIDURAL; INTRACAUDAL; PERINEURAL AS NEEDED
Status: DISCONTINUED | OUTPATIENT
Start: 2022-04-07 | End: 2022-04-07 | Stop reason: HOSPADM

## 2022-04-07 RX ADMIN — SCOPALAMINE 1 PATCH: 1 PATCH, EXTENDED RELEASE TRANSDERMAL at 10:49

## 2022-04-07 RX ADMIN — SODIUM CHLORIDE, POTASSIUM CHLORIDE, SODIUM LACTATE AND CALCIUM CHLORIDE 100 ML/HR: 600; 310; 30; 20 INJECTION, SOLUTION INTRAVENOUS at 10:50

## 2022-04-07 RX ADMIN — KETAMINE HYDROCHLORIDE 30 MG: 10 INJECTION INTRAMUSCULAR; INTRAVENOUS at 13:25

## 2022-04-07 RX ADMIN — SIMETHICONE 120 MG: 80 TABLET, CHEWABLE ORAL at 21:15

## 2022-04-07 RX ADMIN — KETAMINE HYDROCHLORIDE 10 MG: 10 INJECTION INTRAMUSCULAR; INTRAVENOUS at 14:42

## 2022-04-07 RX ADMIN — KETOROLAC TROMETHAMINE 30 MG: 30 INJECTION, SOLUTION INTRAMUSCULAR; INTRAVENOUS at 21:15

## 2022-04-07 RX ADMIN — ROCURONIUM BROMIDE 30 MG: 50 INJECTION INTRAVENOUS at 13:47

## 2022-04-07 RX ADMIN — FAMOTIDINE 20 MG: 20 TABLET ORAL at 17:53

## 2022-04-07 RX ADMIN — PROPOFOL 200 MG: 10 INJECTION, EMULSION INTRAVENOUS at 13:03

## 2022-04-07 RX ADMIN — HYDROMORPHONE HYDROCHLORIDE 0.5 MG: 2 INJECTION, SOLUTION INTRAMUSCULAR; INTRAVENOUS; SUBCUTANEOUS at 15:01

## 2022-04-07 RX ADMIN — SODIUM CHLORIDE 100 ML/HR: 9 INJECTION, SOLUTION INTRAVENOUS at 21:14

## 2022-04-07 RX ADMIN — SUGAMMADEX 400 MG: 100 INJECTION, SOLUTION INTRAVENOUS at 14:53

## 2022-04-07 RX ADMIN — CEFAZOLIN SODIUM 2 G: 2 INJECTION, SOLUTION INTRAVENOUS at 12:52

## 2022-04-07 RX ADMIN — FENTANYL CITRATE 100 MCG: 0.05 INJECTION, SOLUTION INTRAMUSCULAR; INTRAVENOUS at 13:03

## 2022-04-07 RX ADMIN — MIDAZOLAM 1 MG: 1 INJECTION INTRAMUSCULAR; INTRAVENOUS at 11:27

## 2022-04-07 RX ADMIN — MIDAZOLAM 1 MG: 1 INJECTION INTRAMUSCULAR; INTRAVENOUS at 11:17

## 2022-04-07 RX ADMIN — KETAMINE HYDROCHLORIDE 10 MG: 10 INJECTION INTRAMUSCULAR; INTRAVENOUS at 14:24

## 2022-04-07 RX ADMIN — GLYCOPYRROLATE 0.2 MG: 0.2 INJECTION INTRAMUSCULAR; INTRAVENOUS at 15:13

## 2022-04-07 RX ADMIN — ROCURONIUM BROMIDE 20 MG: 50 INJECTION INTRAVENOUS at 14:23

## 2022-04-07 RX ADMIN — FENTANYL CITRATE 50 MCG: 50 INJECTION INTRAMUSCULAR; INTRAVENOUS at 15:49

## 2022-04-07 RX ADMIN — SODIUM CHLORIDE, POTASSIUM CHLORIDE, SODIUM LACTATE AND CALCIUM CHLORIDE 100 ML/HR: 600; 310; 30; 20 INJECTION, SOLUTION INTRAVENOUS at 11:14

## 2022-04-07 RX ADMIN — ACETAMINOPHEN 1000 MG: 500 TABLET ORAL at 10:50

## 2022-04-07 RX ADMIN — ONDANSETRON 4 MG: 2 INJECTION INTRAMUSCULAR; INTRAVENOUS at 13:25

## 2022-04-07 RX ADMIN — HYDROCODONE BITARTRATE AND ACETAMINOPHEN 1 TABLET: 7.5; 325 TABLET ORAL at 16:21

## 2022-04-07 RX ADMIN — DEXAMETHASONE SODIUM PHOSPHATE 10 MG: 4 INJECTION, SOLUTION INTRAMUSCULAR; INTRAVENOUS at 13:25

## 2022-04-07 RX ADMIN — ROCURONIUM BROMIDE 50 MG: 50 INJECTION INTRAVENOUS at 13:03

## 2022-04-07 RX ADMIN — SODIUM CHLORIDE, POTASSIUM CHLORIDE, SODIUM LACTATE AND CALCIUM CHLORIDE 9 ML/HR: 600; 310; 30; 20 INJECTION, SOLUTION INTRAVENOUS at 11:27

## 2022-04-07 RX ADMIN — HYDROMORPHONE HYDROCHLORIDE 0.5 MG: 1 INJECTION, SOLUTION INTRAMUSCULAR; INTRAVENOUS; SUBCUTANEOUS at 16:20

## 2022-04-07 RX ADMIN — LIDOCAINE HYDROCHLORIDE 100 MG: 20 INJECTION, SOLUTION INFILTRATION; PERINEURAL at 13:03

## 2022-04-07 RX ADMIN — FAMOTIDINE 20 MG: 10 INJECTION INTRAVENOUS at 10:49

## 2022-04-07 NOTE — H&P
Pt seen and examined in pre op holding area. There are no changes to her original H & P.   We reviewed the procedure as planned, as well as the benefits, risks, alternatives and possible complications (bleeding, infection, hemorrhage, damage to surrournding structures including bladder or bowel, failure to cure, need for additional treatment, as well as perioperative cardiopulmonary, thromboembolic or other medical events.)   We reviewed post operative care and home instructions.  All questions were answered and she wishes to proceed with surgery.           Adela Jay D.O  2022  10:26 EDT    Gynecologic Oncology   [address] [facility] [address] [place] [address]    [phone] office    *TELEHEALTH VISIT *     Patient consented to telephone visit for medical care today.   Total time spent reviewing images, labs, discussion and plan was 15 minutes.       Adela Jay D.O  2022                    Age: 61 y.o.  Sex: female  :  1960  MRN: [id number]     REFERRING PHYSICIAN: Adela Jay DO  DATE OF VISIT: 2022     Rachel Sierra presents to [facility] for evaluation and management of newly diagnosed high grade papillary serous uterine carcinoma. She was having PMB and Maday Morales obtained EMBx for the diagnosis. She denies constitutional symptoms.       Oncology/Hematology History Overview Note   Rachel Sierra is a 61 y.o. female referred by DANIEL Cutler ([place]) for endometrial carcinoma.     • 22: TVUS-uterus-10 cm length, 3.9 cm fundal fibroid, ET-27 mm, ovaries normal.  • 22: EMB-high-grade carcinoma w/ papillary features compatible with endometrial serous carcinoma.            Past Medical History:  Past Medical History:   Diagnosis Date   • Endometrial carcinoma (HCC)    • PONV (postoperative nausea and vomiting)    • Post-menopausal bleeding    • Seasonal allergies        Past Surgical History:  Past Surgical History:   Procedure  Laterality Date   • BREAST BIOPSY Left     lipoma   • CHOLECYSTECTOMY     • ENDOMETRIAL BIOPSY  2015   • LIPOMA EXCISION      MULTIPLE   • TONSILLECTOMY          MEDICATIONS:    Current Facility-Administered Medications:   •  acetaminophen (TYLENOL) tablet 1,000 mg, 1,000 mg, Oral, Once, Clay Daniels MD  •  ceFAZolin in dextrose (ANCEF) IVPB solution 2 g, 2 g, Intravenous, Once, Adela Jay DO  •  famotidine (PEPCID) injection 20 mg, 20 mg, Intravenous, Once, Clay Daniels MD  •  fentaNYL citrate (PF) (SUBLIMAZE) injection 50 mcg, 50 mcg, Intravenous, Q10 Min PRN, Clay Daniels MD  •  lactated ringers infusion, 100 mL/hr, Intravenous, Continuous, Adela Jay DO  •  lactated ringers infusion, 9 mL/hr, Intravenous, Continuous, Clay Daniels MD  •  lidocaine PF 1% (XYLOCAINE) injection 0.5 mL, 0.5 mL, Injection, Once PRN, Clay Daniels MD  •  midazolam (VERSED) injection 1 mg, 1 mg, Intravenous, Q10 Min PRN, Clay Daniels MD  •  ondansetron (ZOFRAN) injection 4 mg, 4 mg, Intravenous, Q6H PRN, Adela Jay DO  •  scopolamine patch 1 mg/72 hr, 1 patch, Transdermal, Once, Clay Daniels MD  •  sodium chloride 0.9 % flush 10 mL, 10 mL, Intravenous, Q12H, Adela Jay DO  •  sodium chloride 0.9 % flush 10 mL, 10 mL, Intravenous, PRN, Adela Jay DO  •  sodium chloride 0.9 % flush 3 mL, 3 mL, Intravenous, Q12H, Clay Daniels MD  •  sodium chloride 0.9 % flush 3-10 mL, 3-10 mL, Intravenous, PRN, Clay Daniels MD    ALLERGIES:  Allergies   Allergen Reactions   • Honey Anaphylaxis     Anaphylaxis as a child - edema at lips as adult.   • Sulfa Antibiotics Rash and Swelling     Edema throat, hands and face -  Hospitalized for 2 days.   • Bupropion Hives   • Nuts Other (See Comments)     Blood in stools         ROS:  CONSTITUTIONAL:  Denies fever or chills.   NEUROLOGIC:  Denies headache, focal weakness or sensory changes.   EYES:   Denies change in visual acuity.  HEENT:  Denies nasal congestion or sore throat.   RESPIRATORY:  Denies cough or shortness of breath.   CARDIOVASCULAR:  Denies chest pain or edema.   GI:  Denies abdominal pain, nausea, vomiting, bloody stools or diarrhea.   :  Denies dysuria, leaking or incontinence.  MUSCULOSKELETAL:  Denies back pain or joint pain.   INTEGUMENT:  Denies rash.   ENDOCRINE:  Denies polyuria or polydipsia.   LYMPHATIC:  Denies swollen glands or lymphedema.   PSYCHIATRIC:  Denies depression or anxiety.      PHYSICAL EXAM:  Vitals:    04/07/22 1014   BP: 142/63   BP Location: Right arm   Patient Position: Lying   Pulse: 67   Resp: 16   Temp: 98.5 °F (36.9 °C)   TempSrc: Oral   SpO2: 96%   Weight: 102 kg (225 lb 1.4 oz)     Body mass index is 37.46 kg/m².  No flowsheet data found.  PHQ-9 Total Score:           Result Review :  The pertinent labs, images, and/or pathology as noted in the oncology history were reviewed independently and discussed with the patient.   No name on file.   03/16/2022    [facility] LABS:   WBC   Date Value Ref Range Status   04/06/2022 7.24 3.40 - 10.80 10*3/mm3 Final     RBC   Date Value Ref Range Status   04/06/2022 5.42 (H) 3.77 - 5.28 10*6/mm3 Final     Hemoglobin   Date Value Ref Range Status   04/06/2022 15.4 12.0 - 15.9 g/dL Final     Hematocrit   Date Value Ref Range Status   04/06/2022 46.3 34.0 - 46.6 % Final     Platelets   Date Value Ref Range Status   04/06/2022 303 140 - 450 10*3/mm3 Final     No results found for:     [facility] IMAGING:  XR Chest PA & Lateral    Result Date: 4/6/2022  1. No active disease is seen in the chest.  This report was finalized on 4/6/2022 2:59 PM by Dr. Mac Claros M.D.       Breast Bilateral Limited    Result Date: 3/17/2022  1. Benign cyst and/or ductal ectasia as described above representing the density seen on mammography. Findings are consistent with benign process. Return to annual screening mammography recommended. 2. BI-RADS  2-benign exam This report was finalized on 03/17/2022 11:10 by Dr. Kinga Arreola MD.    Mammo Diagnostic Digital Tomosynthesis Bilateral With CAD    Result Date: 3/17/2022  1. Persistent nodular densities within the right retroareolar and the left medial breast near 9-10 o'clock correspond with benign cysts versus ductal ectasia in the medial left breast. Findings are consistent with benign process. Return to annual screening mammography recommended. Next screening mammogram due in March 2023. 2. BI-RADS 2-benign exam. This report was finalized on 03/17/2022 12:41 by Dr. Kinga Arreola MD.    Mammo Screening Digital Tomosynthesis Bilateral With CAD    Result Date: 3/10/2022  Additional evaluation of both breasts is recommended with focal spot compression views and true lateral views as detailed above. The images are marked electronically. BI-RADS Category 0, incomplete.  Tyrer-Cuzick lifetime risk score of 13.5 percent. NCCN guidelines for genetic testing is met. American Cancer Society guidelines recommend screening breast MRI for women with estimated lifetime risk of breast cancer greater than 20%. This report was finalized on 03/10/2022 16:33 by Dr. Cristian Medellin MD.            ASSESSMENT :  • UPSC on EMBX 2/24/22          PLAN :    The case was reviewed with the patient in detail, taking into consideration symptoms, laboratory results, imaging, pathology and physical exam findings.  At this point in time, I am recommending Robotic assisted total laparoscopic hysterectomy, bilateral salpingoophorecotmy, sentinel lymph node mapping, possible staging, possible laparotomy.   .     Risks, benefits, alternatives and indications to the procedure were reviewed in detail.    Risks of surgery include bleeding/hemorrhage which may require transfusion and associated transfusion risk (transfusion reaction, HCV, TRALI ); Infection, which may require antibiotic therapy or abscess drainage; Damage to surrounding internal  organs (bowel, bladder, or urinary tract) which may result in obstruction or fistula; Nerve, or vascular injury which may result in numbness, pain, swelling or loss of strength. Risk of possible incomplete resolution of symptoms or failure to cure.  She understands that it is possible that intraoperative findings may warrant further treatment.  There is a low, but real, risk of unanticipated return to the OR for a problem associated with the surgery and a remote possibility of death.      • All questions were addressed and answered to the patient's satisfaction. She indicates understanding of these risks and desires to proceed. She wishes me to use my judgement to do the procedure that I feel is in her best interest. Surgical consents were signed.  •             Adela Jay D.O  4/7/2022    Gynecologic Oncology   [address] [place] [address]  [phone] office

## 2022-04-07 NOTE — ANESTHESIA PROCEDURE NOTES
Airway  Urgency: elective    Date/Time: 4/7/2022 1:12 PM  Airway not difficult    General Information and Staff    Patient location during procedure: OR  CRNA: Karrie Reynolds CRNA    Indications and Patient Condition  Indications for airway management: airway protection    Preoxygenated: yes  MILS maintained throughout  Mask difficulty assessment: 1 - vent by mask    Final Airway Details  Final airway type: endotracheal airway      Successful airway: ETT  Cuffed: yes   Successful intubation technique: direct laryngoscopy  Facilitating devices/methods: intubating stylet and cricoid pressure  Endotracheal tube insertion site: oral  Blade: CMAC  Blade size: D  ETT size (mm): 7.0  Cormack-Lehane Classification: grade IIa - partial view of glottis  Placement verified by: chest auscultation and capnometry   Measured from: lips  ETT/EBT  to lips (cm): 20  Number of attempts at approach: 2  Assessment: lips, teeth, and gum same as pre-op and atraumatic intubation    Additional Comments  Atraumatic insertion

## 2022-04-07 NOTE — ANESTHESIA PREPROCEDURE EVALUATION
Anesthesia Evaluation     history of anesthetic complications: PONV  NPO Solid Status: > 8 hours             Airway   Mallampati: II  TM distance: >3 FB  Neck ROM: full  Dental          Pulmonary - normal exam   Cardiovascular - normal exam    (-) hypertension, past MI      Neuro/Psych  GI/Hepatic/Renal/Endo      Musculoskeletal     Abdominal    Substance History      OB/GYN          Other      history of cancer                    Anesthesia Plan    ASA 2     general     intravenous induction     Anesthetic plan, all risks, benefits, and alternatives have been provided, discussed and informed consent has been obtained with: patient.        CODE STATUS:

## 2022-04-07 NOTE — OP NOTE
Gynecologic Oncology - Operative Report         PATIENT NAME: Rachel Sierra  YOB: 1960   AGE: 61 y.o.  MRN#: [id number]  [facility]#: [id number]      DATE OF OPERATION: 4/7/2022    PREOPERATIVE DIAGNOSIS:   1. UPSC (Uterine papillary serous carcinoma)     POSTOPERATIVE DIAGNOSIS:  1. Same     OPERATION PERFORMED:    1. Robotic assisted total laparoscopic hysterectomy   2. Bilateral salpingooophorectomy   3. [place] lymph node mapping   4. Vaginal laceration repair    SURGEON: Adela Jay D.O     ASSISTANT: CAROL Hagen     ANESTHESIA: GEN     ESTIMATED BLOOD LOSS: 100mL   IVF: 1400mL LR   UO: 100mL   LINES/DRAINS: n/a    INDICATIONS: UPSC     FINDINGS: enlarged uterus, normal appearing bilateral tubes and ovaries. Frozen section with high grade tumor, <50% invasion.      PROCEDURE:   After assuring informed consent the patient was taken back to the operating suite and induction of general anesthesia was performed.  The patient was placed in the modified dorsal lithotomy position in Shen stirrups then prepped and draped in the normal sterile fashion.  The open sided bi-valve speculum was placed in the patient's vagina and the cervix was grasped at the 12 O'clock position with a single tooth tenaculum.  The uterus was sounded to 12 cm and the cervix was dilated with the ashly dilators up to a number 15. 0.5cc of indocyanine green was injected into the left cervix, and then on the right cervix in a four quadrant fashion.  A V-Care uterine manipulator was placed inside the uterine cavity.  Whiting catheter was placed.  Gloves were changed and attention was then taken to the abdomen.    A supraumbilical skin incision was then made approximately 26cm above the pubic symphysis and a veress needle was introduced into this incision.  Proper placement was confirmed by sterile water flowing easily into this incision and a low opening CO2 pressure.  High flow insufflation was utilized to create pneumoperitoneum to  a maximum pressure of 15 mmHg.  An 8mm trocar was then inserted through this incision and the Robotic camera verified proper placement and safe entry for camera port/robotic arm #3.  Three additional 8mm ports were placed.  Robotic arm #4 port was placed 10cm right lateral and 15 degrees inferiorly.  Robotic arm #2 port was placed 10 cm left lateral and 15 degrees inferiorly. Arm 1 was placed 10cm left lateral and 15 degrees superior to arm #2.  A 10/12 accessory port was placed beneath the right costal margin.  The patient was then placed in steep Trendelenberg position.  Pelvic washings were obtained.  Blunt grasper was used to move bowel and omentum into the upper abdomen for maximum visualization. The robot was docked.  Monopolar scissors were placed in arm #4; bipolar fenestrated graspers in arm #2 and the double fenestrated graspers were placed in arm #1.      Retroperitonal space was opened. The ureter was visualized and swept medially.  Superior vesical artery was skeletonized and also retracted medially.  Firefly near infrared camera was then utilitzed to locate the sentinel nodes. They were located at the obturator on the right and the obturator on the left. These were dissected free and placed in an endocatch bag, then removed from the abdomen and sent for permanent section.    Bilateral round ligaments were taken down with monopolar cautery and the retroperitoneal space was opened.  The underlying ureters were visualized. A window was created in the posterior leaf of the broad ligament.  The infundibulopelvic ligaments bilaterally were cauterized with bipolar cautery and cut with monopolar cautery.  The vesicouterine peritoneum was incised and the bladder was dissected off the lower uterine segment and cervix.  The uterine vessels were then cauterized bilaterally with the bipolar and transected with the monopolar cautery.  The cardinal and uterosacral ligaments were then cauterized with the bipolar and  transected with the monopolar cautery.  A posterior colpotomy was made at the level of the Vcare and brought around circumferentially freeing up the specimen, which was brought out through the vagina.    The vaginal cuff was then reapproximated with 0 PDS suture in a running fashion, starting from either end and tying in the middle.  The pelvis was then irrigated and the vaginal cuff was noted to be hemostatic.  The infundibulopelvic ligaments were noted to be hemostatic as well.  The instruments were then removed and the robot was undocked.  The pneumoperitoneum was then evacuated and all trocars were removed.  The 10-12 mm trocar site fascia was reapproximated with a figure of eight stitch of 0 vicryl.  All skin incisions were closed with a 4-0 vicryl subcuticular stitch.  Incisions were reinjected with additional local anesthetic. Steri-strips were placed as well as bandages.  The vagina was examined with sponge sticks and small periurethral lac noted. This was closed with 2-0 vicryl and vaginal pacing was placed. Second examination noted to be hemostatic.  Sponge, lap, needle and instrument counts were correct x 2.  The patient was taken to the recovery room in awake and stable condition.        Adela Jay D.O  4/7/2022    Gynecologic Oncology   [address] [place] [id number] [phone] office

## 2022-04-07 NOTE — ANESTHESIA POSTPROCEDURE EVALUATION
Patient: Rachel Sierra    Procedure Summary     Date: 04/07/22 Room / Location: [facility] OR  / [facility] MAIN OR    Anesthesia Start: 1256 Anesthesia Stop: 1527    Procedure: Robotic assisted total laparoscopic hysterectomy, bilateral salpingoophorecotmy, sentinel lymph node mapping. (N/A Abdomen) Diagnosis:       Endometrial carcinoma (HCC)      (Endometrial carcinoma (HCC) [C54.1])    Surgeons: Adela Jay DO Provider: Tabatha Doan MD    Anesthesia Type: general ASA Status: 2          Anesthesia Type: general    Vitals  Vitals Value Taken Time   /92 04/07/22 1646   Temp 36.4 °C (97.5 °F) 04/07/22 1530   Pulse 65 04/07/22 1650   Resp 16 04/07/22 1630   SpO2 93 % 04/07/22 1650   Vitals shown include unvalidated device data.        Post Anesthesia Care and Evaluation    Patient location during evaluation: bedside  Patient participation: complete - patient participated  Level of consciousness: awake  Pain management: adequate  Airway patency: patent  Anesthetic complications: No anesthetic complications    Cardiovascular status: acceptable  Respiratory status: acceptable  Hydration status: acceptable

## 2022-04-08 VITALS
HEART RATE: 60 BPM | DIASTOLIC BLOOD PRESSURE: 58 MMHG | BODY MASS INDEX: 37.46 KG/M2 | OXYGEN SATURATION: 95 % | RESPIRATION RATE: 16 BRPM | TEMPERATURE: 98.2 F | WEIGHT: 225.09 LBS | SYSTOLIC BLOOD PRESSURE: 113 MMHG

## 2022-04-08 LAB
ANION GAP SERPL CALCULATED.3IONS-SCNC: 7.8 MMOL/L (ref 5–15)
BASOPHILS # BLD AUTO: 0.03 10*3/MM3 (ref 0–0.2)
BASOPHILS NFR BLD AUTO: 0.2 % (ref 0–1.5)
BUN SERPL-MCNC: 8 MG/DL (ref 8–23)
BUN/CREAT SERPL: 11.6 (ref 7–25)
CALCIUM SPEC-SCNC: 8.5 MG/DL (ref 8.6–10.5)
CHLORIDE SERPL-SCNC: 107 MMOL/L (ref 98–107)
CO2 SERPL-SCNC: 25.2 MMOL/L (ref 22–29)
CREAT SERPL-MCNC: 0.69 MG/DL (ref 0.57–1)
DEPRECATED RDW RBC AUTO: 42 FL (ref 37–54)
EGFRCR SERPLBLD CKD-EPI 2021: 97.7 ML/MIN/1.73
EOSINOPHIL # BLD AUTO: 0 10*3/MM3 (ref 0–0.4)
EOSINOPHIL NFR BLD AUTO: 0 % (ref 0.3–6.2)
ERYTHROCYTE [DISTWIDTH] IN BLOOD BY AUTOMATED COUNT: 13.3 % (ref 12.3–15.4)
GLUCOSE SERPL-MCNC: 122 MG/DL (ref 65–99)
HCT VFR BLD AUTO: 41.6 % (ref 34–46.6)
HGB BLD-MCNC: 13.6 G/DL (ref 12–15.9)
IMM GRANULOCYTES # BLD AUTO: 0.06 10*3/MM3 (ref 0–0.05)
IMM GRANULOCYTES NFR BLD AUTO: 0.5 % (ref 0–0.5)
LYMPHOCYTES # BLD AUTO: 1.1 10*3/MM3 (ref 0.7–3.1)
LYMPHOCYTES NFR BLD AUTO: 9.1 % (ref 19.6–45.3)
MCH RBC QN AUTO: 28.2 PG (ref 26.6–33)
MCHC RBC AUTO-ENTMCNC: 32.7 G/DL (ref 31.5–35.7)
MCV RBC AUTO: 86.1 FL (ref 79–97)
MONOCYTES # BLD AUTO: 0.46 10*3/MM3 (ref 0.1–0.9)
MONOCYTES NFR BLD AUTO: 3.8 % (ref 5–12)
NEUTROPHILS NFR BLD AUTO: 10.47 10*3/MM3 (ref 1.7–7)
NEUTROPHILS NFR BLD AUTO: 86.4 % (ref 42.7–76)
NRBC BLD AUTO-RTO: 0 /100 WBC (ref 0–0.2)
PLATELET # BLD AUTO: 278 10*3/MM3 (ref 140–450)
PMV BLD AUTO: 10.3 FL (ref 6–12)
POTASSIUM SERPL-SCNC: 4.8 MMOL/L (ref 3.5–5.2)
RBC # BLD AUTO: 4.83 10*6/MM3 (ref 3.77–5.28)
SODIUM SERPL-SCNC: 140 MMOL/L (ref 136–145)
WBC NRBC COR # BLD: 12.12 10*3/MM3 (ref 3.4–10.8)

## 2022-04-08 PROCEDURE — G0378 HOSPITAL OBSERVATION PER HR: HCPCS

## 2022-04-08 PROCEDURE — 80048 BASIC METABOLIC PNL TOTAL CA: CPT | Performed by: OBSTETRICS & GYNECOLOGY

## 2022-04-08 PROCEDURE — 85025 COMPLETE CBC W/AUTO DIFF WBC: CPT | Performed by: OBSTETRICS & GYNECOLOGY

## 2022-04-08 PROCEDURE — 25010000002 KETOROLAC TROMETHAMINE PER 15 MG: Performed by: OBSTETRICS & GYNECOLOGY

## 2022-04-08 RX ORDER — HYDROCODONE BITARTRATE AND ACETAMINOPHEN 5; 325 MG/1; MG/1
1 TABLET ORAL EVERY 6 HOURS PRN
Qty: 12 TABLET | Refills: 0 | Status: SHIPPED | OUTPATIENT
Start: 2022-04-08 | End: 2022-04-27 | Stop reason: ALTCHOICE

## 2022-04-08 RX ADMIN — KETOROLAC TROMETHAMINE 30 MG: 30 INJECTION, SOLUTION INTRAMUSCULAR; INTRAVENOUS at 06:05

## 2022-04-08 RX ADMIN — SIMETHICONE 120 MG: 80 TABLET, CHEWABLE ORAL at 11:55

## 2022-04-08 RX ADMIN — ACETAMINOPHEN 650 MG: 325 TABLET ORAL at 11:55

## 2022-04-08 RX ADMIN — SIMETHICONE 120 MG: 80 TABLET, CHEWABLE ORAL at 08:08

## 2022-04-08 RX ADMIN — FAMOTIDINE 20 MG: 20 TABLET ORAL at 08:08

## 2022-04-08 NOTE — PLAN OF CARE
Goal Outcome Evaluation:  Plan of Care Reviewed With: patient        Progress: improving  Outcome Evaluation: AFVSS.  minimal to no discomfort.  5 abd lap sites, border dressing removed, steristrips in place. scant vaginal bleeding after vag pack removed.  voiding.  tolerating PO intake.  d/c orders noted.  sig other at bedside.

## 2022-04-08 NOTE — DISCHARGE SUMMARY
DISCHARGE SUMMARY       PATIENT INFO:  NAME: Rachel Sierra  : 1960  MRN#: [id number]      ADMIT DATE: 2022  9:24 AM   DISCHARGE DATE: 22      ADMITTING DIAGNOSIS:   • Endometrial carcinoma (HCC) [C54.1]  • Uterine cancer (HCC) [C55]       DISCHARGE DIAGNOSIS:   • Post operative state   • High grade uterine serous carcinoma - final path not available at time of dc    Patient Active Problem List   Diagnosis   • Uterine cancer (HCC)   • BMI 36.0-36.9,adult   • Endometrial carcinoma (HCC)   •         HOSPITAL CONSULTANTS:   •  None      Surgical Procedures Since Admission:  Procedure(s):  Robotic assisted total laparoscopic hysterectomy, bilateral salpingoophorecotmy, sentinel lymph node mapping.  Surgeon:  Adela Jay,   Status:  1 Day Post-Op  -------------------  •        OUTPATIENT CARE TEAM:   • Patient Care Team:  • Provider, No Known as PCP - General  • Maday Haywood APRN as Referring Physician (Obstetrics and Gynecology)    HOSPITAL COURSE:   Admitted post op   Did well   Clear for DC POD#1          DC MEDICATIONS:   Current Discharge Medication List      CONTINUE these medications which have NOT CHANGED    Details   ferrous sulfate 325 (65 FE) MG tablet Take 325 mg by mouth Daily With Breakfast.      cyanocobalamin (VITAMIN B-12) 1000 MCG tablet Take 1,000 mcg by mouth.            Current Discharge Medication List      START taking these medications    Details   HYDROcodone-acetaminophen (Norco) 5-325 MG per tablet Take 1 tablet by mouth Every 6 (Six) Hours As Needed for Moderate Pain .  Qty: 12 tablet, Refills: 0    Associated Diagnoses: Endometrial carcinoma (HCC)                DISCHARGE INSTRUCTIONS:    • Maintain proper balance of hydration, nutrition, mobility and rest.   • Continue to use your incentive spirometer for the rest of the week.    • Do not lift anything heavier than 10 lbs. for the next two weeks.   • Do not perform strenuous activity.  • Continue  PELVC REST for 6 weeks, which means no tampons, intercourse, douching or submerging in sitting water (baths, jacuzzis or swimming).  • Keep your wound clean and dry.     • Notify the office if you experience:   - Fever > 101F.  - Foul wound drainage, redness or large separation.  - Severe nausea and vomiting.  - Severe increase in pain.   - Severe swelling in either calf.     • Driving is OK if you are not taking narcotics.  • Stairs are OK, just take it slow.                  Adela Jay D.O  4/8/2022    Gynecologic Oncology   [address] [place] [address]  [phone] office

## 2022-04-08 NOTE — PROGRESS NOTES
Case Management Discharge Note      Final Note: Discharged home. Sherri Zaragoza, NIELS         Transportation Services  Private: Car    Final Discharge Disposition Code: 01 - home or self-care

## 2022-04-08 NOTE — PLAN OF CARE
Goal Outcome Evaluation:  Plan of Care Reviewed With: patient        Progress: improving  Outcome Evaluation: Pt arrived from PACU alert and oriented x4, lap sites x5 with border dressings are intact with a scant amount of drainage, vss, afebrile, IVF infusing, villanueva and vaginal packing in place

## 2022-04-08 NOTE — PLAN OF CARE
Goal Outcome Evaluation:  Plan of Care Reviewed With: patient        Progress: improving  Outcome Evaluation: VSS lap sites x 5 with border dressing CDI.  Whiting with good output, to be removed this am.  Vag pack in place.  IS use up to 2500.  Scheduled toradol given for pain

## 2022-04-11 LAB
BEAKER LAB AP INTRAOPERATIVE CONSULTATION: NORMAL
LAB AP CASE REPORT: NORMAL
LAB AP DIAGNOSIS COMMENT: NORMAL
LAB AP SPECIAL STAINS: NORMAL
LAB AP SYNOPTIC CHECKLIST: NORMAL
PATH REPORT.FINAL DX SPEC: NORMAL
PATH REPORT.GROSS SPEC: NORMAL

## 2022-04-15 ENCOUNTER — TELEPHONE (OUTPATIENT)
Dept: GYNECOLOGIC ONCOLOGY | Facility: CLINIC | Age: 62
End: 2022-04-15

## 2022-04-15 NOTE — TELEPHONE ENCOUNTER
Caller: Rachel Sierra    Relationship: Self    Best call back number: [phone]    Who are you requesting to speak with (clinical staff, provider,  specific staff member): CLINICAL    What was the call regarding: PATIENT CALLING TO VERIFY IF 4/18/22 APPT CAN BE TELEHEALTH OR DOES IT NEED TO BE IN PERSON.    PATIENT STATES THIS WAS DEPENDENT ON PATHOLOGY REPORT    Do you require a callback: YES

## 2022-04-15 NOTE — TELEPHONE ENCOUNTER
Called patient and informed her that the Post Op appt needs to be in person. Patient understood and confirmed.

## 2022-04-18 ENCOUNTER — OFFICE VISIT (OUTPATIENT)
Dept: GYNECOLOGIC ONCOLOGY | Facility: CLINIC | Age: 62
End: 2022-04-18

## 2022-04-18 VITALS
HEIGHT: 65 IN | DIASTOLIC BLOOD PRESSURE: 79 MMHG | HEART RATE: 69 BPM | WEIGHT: 225 LBS | OXYGEN SATURATION: 99 % | RESPIRATION RATE: 16 BRPM | BODY MASS INDEX: 37.49 KG/M2 | SYSTOLIC BLOOD PRESSURE: 132 MMHG

## 2022-04-18 DIAGNOSIS — C54.1 ENDOMETRIAL CARCINOMA: Primary | ICD-10-CM

## 2022-04-18 PROBLEM — Z45.2 FITTING AND ADJUSTMENT OF VASCULAR CATHETER: Status: ACTIVE | Noted: 2022-04-18

## 2022-04-18 PROCEDURE — 99212 OFFICE O/P EST SF 10 MIN: CPT | Performed by: OBSTETRICS & GYNECOLOGY

## 2022-04-18 RX ORDER — SODIUM CHLORIDE 0.9 % (FLUSH) 0.9 %
10 SYRINGE (ML) INJECTION AS NEEDED
Status: CANCELLED | OUTPATIENT
Start: 2022-04-18

## 2022-04-18 RX ORDER — HEPARIN SODIUM (PORCINE) LOCK FLUSH IV SOLN 100 UNIT/ML 100 UNIT/ML
500 SOLUTION INTRAVENOUS AS NEEDED
Status: CANCELLED | OUTPATIENT
Start: 2022-04-18

## 2022-04-18 NOTE — PROGRESS NOTES
Age: 61 y.o.  Sex: female  :  1960  MRN: [id number]       REFERRING PHYSICIAN: No ref. provider found  DATE OF VISIT: 2022        Rachel Sierra presents to [facility] for post op visit. She had FIGO IA uterine serous caricnoma with 48% invasion. She is recovering well.       Oncology/Hematology History Overview Note   Rachel Sierra is a 61 y.o. female referred by DANIEL Cutler ([place]) for endometrial carcinoma.     • 22: TVUS-uterus-10 cm length, 3.9 cm fundal fibroid, ET-27 mm, ovaries normal.  • 22: EMB-high-grade carcinoma w/ papillary features compatible with endometrial serous carcinoma.   • 22: TLH, BSO, sentinel lymph node mapping  • 22: Pathology-FIGO stage IA serous carcinoma endometrium, tumor size  4cm, 6.25/13 mm invasion, all margins negative, LVSI negative, 0/6 LNs. ER negative, P16 positive, HER 2 negative, VA positive.    22: Post op     Endometrial carcinoma (HCC)   3/18/2022 Initial Diagnosis    Endometrial carcinoma (HCC)     2022 -  Chemotherapy    OP UTERINE PACLitaxel / CARBOplatin (Q21D)         Past Medical History:  Past Medical History:   Diagnosis Date   • Endometrial carcinoma (HCC)    • PONV (postoperative nausea and vomiting)    • Post-menopausal bleeding    • Seasonal allergies        Past Surgical History:  Past Surgical History:   Procedure Laterality Date   • BREAST BIOPSY Left     lipoma   • CHOLECYSTECTOMY     • ENDOMETRIAL BIOPSY     • LIPOMA EXCISION      MULTIPLE   • TONSILLECTOMY     • TOTAL LAPAROSCOPIC HYSTERECTOMY N/A 2022    Procedure: Robotic assisted total laparoscopic hysterectomy, bilateral salpingoophorecotmy, sentinel lymph node mapping.;  Surgeon: Adela Jay DO;  Location: [facility] OR;  Service: Robotics - [facility];  Laterality: N/A;        MEDICATIONS:    Current Outpatient Medications:   •  cyanocobalamin (VITAMIN B-12) 1000 MCG tablet, Take 1,000 mcg by  "mouth., Disp: , Rfl:   •  ferrous sulfate 325 (65 FE) MG tablet, Take 325 mg by mouth Daily With Breakfast., Disp: , Rfl:   •  HYDROcodone-acetaminophen (Norco) 5-325 MG per tablet, Take 1 tablet by mouth Every 6 (Six) Hours As Needed for Moderate Pain ., Disp: 12 tablet, Rfl: 0    ALLERGIES:  Allergies   Allergen Reactions   • Honey Anaphylaxis     Anaphylaxis as a child - edema at lips as adult.   • Sulfa Antibiotics Rash and Swelling     Edema throat, hands and face -  Hospitalized for 2 days.   • Bupropion Hives   • Nuts Other (See Comments)     Blood in stools         ROS:  CONSTITUTIONAL:  Denies fever or chills.   NEUROLOGIC:  Denies headache, focal weakness or sensory changes.   EYES:  Denies change in visual acuity.  HEENT:  Denies nasal congestion or sore throat.   RESPIRATORY:  Denies cough or shortness of breath.   CARDIOVASCULAR:  Denies chest pain or edema.   GI:  Denies abdominal pain, nausea, vomiting, bloody stools or diarrhea.   :  Denies dysuria, leaking or incontinence.  MUSCULOSKELETAL:  Denies back pain or joint pain.   INTEGUMENT:  Denies rash.   ENDOCRINE:  Denies polyuria or polydipsia.   LYMPHATIC:  Denies swollen glands or lymphedema.   PSYCHIATRIC:  Denies depression or anxiety.      PHYSICAL EXAM:  Vitals:    04/18/22 1320   BP: 132/79   Pulse: 69   Resp: 16   SpO2: 99%   Weight: 102 kg (225 lb)   Height: 165.1 cm (65\")   PainSc: 0-No pain     Body mass index is 37.44 kg/m².  No flowsheet data found.  PHQ-9 Total Score:         GEN: alert and oriented x 3, normal affect, well nourished and hydrated.  CARDIO: regular rate and rhythm.  PULM: Lungs CTA b.l, no RRW   ABD: Soft, nontender, nondistended. Incisions well healed   GYN: defer today   EXT: No petechiae, bruising, rash, candida. No CCE.       Result Review :  The pertinent labs, images, and/or pathology as noted in the oncology history were reviewed independently and discussed with the patient.   Adela Jay, DO "   04/18/2022    [facility] LABS:   WBC   Date Value Ref Range Status   04/08/2022 12.12 (H) 3.40 - 10.80 10*3/mm3 Final     RBC   Date Value Ref Range Status   04/08/2022 4.83 3.77 - 5.28 10*6/mm3 Final     Hemoglobin   Date Value Ref Range Status   04/08/2022 13.6 12.0 - 15.9 g/dL Final     Hematocrit   Date Value Ref Range Status   04/08/2022 41.6 34.0 - 46.6 % Final     Platelets   Date Value Ref Range Status   04/08/2022 278 140 - 450 10*3/mm3 Final     No results found for:     [facility]:  XR Chest PA & Lateral    Result Date: 4/6/2022  1. No active disease is seen in the chest.  This report was finalized on 4/6/2022 2:59 PM by Dr. Mac Claros M.D.      US Breast Bilateral Limited    Result Date: 3/17/2022  1. Benign cyst and/or ductal ectasia as described above representing the density seen on mammography. Findings are consistent with benign process. Return to annual screening mammography recommended. 2. BI-RADS 2-benign exam This report was finalized on 03/17/2022 11:10 by Dr. Kinga Arreola MD.    Mammo Diagnostic Digital Tomosynthesis Bilateral With CAD    Result Date: 3/17/2022  1. Persistent nodular densities within the right retroareolar and the left medial breast near 9-10 o'clock correspond with benign cysts versus ductal ectasia in the medial left breast. Findings are consistent with benign process. Return to annual screening mammography recommended. Next screening mammogram due in March 2023. 2. BI-RADS 2-benign exam. This report was finalized on 03/17/2022 12:41 by Dr. Kinga Arreola MD.    Mammo Screening Digital Tomosynthesis Bilateral With CAD    Result Date: 3/10/2022  Additional evaluation of both breasts is recommended with focal spot compression views and true lateral views as detailed above. The images are marked electronically. BI-RADS Category 0, incomplete.  Tyrer-Cuzick lifetime risk score of 13.5 percent. NCCN guidelines for genetic testing is met. American Cancer Society  guidelines recommend screening breast MRI for women with estimated lifetime risk of breast cancer greater than 20%. This report was finalized on 03/10/2022 16:33 by Dr. Cristian Medellin MD.        ASSESSMENT :  • FIGO IA uterine serous carcinoma - 4cm tumor with 48% invasion.         PLAN :  • Reviewed NCCN guidelines , recommend systemic therapy with carboplatin/taxol with +/- WPXRT and brachy. Pt lives near [place], will arrange for care near home and follow peripherally and assist with surveillance once treatment is complete.             Adela Jay D.O  4/18/2022    Gynecologic Oncology   [address] [place] [id number] [phone] office

## 2022-04-24 NOTE — PROGRESS NOTES
[facility] HEMATOLOGY & ONCOLOGY  [address]  [facility] [address] [phone]    Patient Name: Rachel Sierra  Encounter Date: 04/27/2022  YOB: 1960  Patient Number: [id number]    REASON FOR CONSULTATION: Endometrial carcinoma    HISTORY OF PRESENT ILLNESS: Rachel Sierra is a 61-year-old female who presented with postmenopausal bleeding.    --02/24/2022- transvaginal ultrasound--uterus 10 cm length, 3.9 cm fundal fibroid.  Endometrial thickness 27 mm, ovaries normal.    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    --03/07/2022- BRCA 1/2: Negative-no clinically significant variants identified    --03/17/2022-mammogram-persistent nodular densities within the right retroareolar and left medial breast corresponding with benign cysts versus ductal ectasia in the medial left breast.  Finding consistent with benign process.  Return to annual screening mammography recommended, March 2023.  BI-RADS 2 benign exam.    --04/06/2022- BMP normal.  CBC normal.    --04/07/2022- robotic assisted total laparoscopic hysterectomy, bilateral salpingo-oophorectomy, sentinel lymph node mapping by Dr. Jay.    --Final diagnosis:1. Uterus, Cervix, Bilateral Fallopian Tubes and Ovaries, Hysterectomy with Bilateral Salpingo-oophorectomy         (236 grams): High-grade endometrial carcinoma.               A. Endometrium:                             1. High-grade serous carcinoma.                            2. The tumor measures 4.2 x 3.5 x 1.5 cm.                            3. The tumor extends into the myometrium a total of 6.25 mm out of a total thickness of 13 mm       (48%)  4. No definitive capillary lymphatic invasion is identified.   5. No definitive perineural invasion is seen.  6. Adenomyosis is identified with no definitive tumor involving the  adenomyosis.  7. Parametrium not involved.               8. The tumor extends into the lower uterine segment but does not invade the stroma.  B. Benign cervix with               1. Nabothian cysts.  C. Benign parametria.  D. Benign right fallopian tube.  E. Benign right ovary.  F. Benign left fallopian tube.  G. Benign ovary.               H. Benign serosa.     2. Lymph Nodes, Right Obturator [place] Lymph Node, Excision:                A. Three benign lymph nodes.     3. Lymph Nodes, Left Obturator [place] Lymph Node, Excision:               A. Three benign lymph nodes.     Postoperatively the patient is seen for subsequent management considerations    I have reviewed the HPI and verified with the patient the accuracy of it. No changes to interval history since the information was documented. Js Lima MD 04/27/22     LABS    Lab Results - Last 18 Months   Lab Units 04/08/22 0447 04/06/22  1244   HEMOGLOBIN g/dL 13.6 15.4   HEMATOCRIT % 41.6 46.3   MCV fL 86.1 85.4   WBC 10*3/mm3 12.12* 7.24   RDW % 13.3 13.6   MPV fL 10.3 9.8   PLATELETS 10*3/mm3 278 303   IMM GRAN % % 0.5 0.3   NEUTROS ABS 10*3/mm3 10.47* 4.09   LYMPHS ABS 10*3/mm3 1.10 2.06   MONOS ABS 10*3/mm3 0.46 0.57   EOS ABS 10*3/mm3 0.00 0.41*   BASOS ABS 10*3/mm3 0.03 0.09   IMMATURE GRANS (ABS) 10*3/mm3 0.06* 0.02   NRBC /100 WBC 0.0 0.0       Lab Results - Last 18 Months   Lab Units 04/08/22  0447 04/06/22  1235   GLUCOSE mg/dL 122* 81   SODIUM mmol/L 140 141   POTASSIUM mmol/L 4.8 4.6   CO2 mmol/L 25.2 28.0   CHLORIDE mmol/L 107 105   ANION GAP mmol/L 7.8 8.0   CREATININE mg/dL 0.69 0.83   BUN mg/dL 8 16   BUN / CREAT RATIO  11.6 19.3   CALCIUM mg/dL 8.5* 9.7       No results for input(s): MSPIKE, KAPPALAMB, IGLFLC, URICACID, FREEKAPPAL, CEA, LDH, REFLABREPO in the last 88087 hours.    No results for input(s): IRON, TIBC, LABIRON, FERRITIN, N4GRXVJ, TSH, FOLATE in the last 11421 hours.    Invalid input(s): VITB12      PAST MEDICAL  HISTORY:  ALLERGIES:  Allergies   Allergen Reactions   • Honey Anaphylaxis     Anaphylaxis as a child - edema at lips as adult.   • Sulfa Antibiotics Rash and Swelling     Edema throat, hands and face -  Hospitalized for 2 days.   • Bupropion Hives   • Nuts Other (See Comments)     Blood in stools     CURRENT MEDICATIONS:  Outpatient Encounter Medications as of 4/27/2022   Medication Sig Dispense Refill   • cyanocobalamin (VITAMIN B-12) 1000 MCG tablet Take 1,000 mcg by mouth.     • ferrous sulfate 325 (65 FE) MG tablet Take 325 mg by mouth Daily With Breakfast.     • HYDROcodone-acetaminophen (Norco) 5-325 MG per tablet Take 1 tablet by mouth Every 6 (Six) Hours As Needed for Moderate Pain . 12 tablet 0     No facility-administered encounter medications on file as of 4/27/2022.     Adult illnesses:  Endometrial carcinoma  Seasonal allergies    Past surgeries:  Left breast biopsy-lipoma  Cholecystectomy  Endometrial biopsy, 2015  Multiple lipoma excisions  Tonsillectomy  Endometrial biopsy, 02/24/2022  Robotic assisted total laparoscopic hysterectomy/bilateral salpingo-oophorectomy/sentinel lymph node mapping, vaginal laceration repair, 04/17/2022-Dr. Jay      ADULT ILLNESSES:  Patient Active Problem List   Diagnosis Code   • Uterine cancer (HCC) C55   • BMI 36.0-36.9,adult Z68.36   • Endometrial carcinoma (HCC) C54.1   • Fitting and adjustment of vascular catheter Z45.2   • Non-smoker Z78.9   • S/P KIMMY-BSO (total abdominal hysterectomy and bilateral salpingo-oophorectomy) Z90.710, Z90.722, Z90.79     SURGERIES:  Past Surgical History:   Procedure Laterality Date   • BREAST BIOPSY Left     lipoma   • CHOLECYSTECTOMY     • ENDOMETRIAL BIOPSY  2015   • LIPOMA EXCISION      MULTIPLE   • TONSILLECTOMY     • TOTAL LAPAROSCOPIC HYSTERECTOMY N/A 4/7/2022    Procedure: Robotic assisted total laparoscopic hysterectomy, bilateral salpingoophorecotmy, sentinel lymph node mapping.;  Surgeon: Adela Jay DO;   "Location: [facility] MAIN OR;  Service: Robotics - DaVinci;  Laterality: N/A;     HEALTH MAINTENANCE ITEMS:  Health Maintenance Due   Topic Date Due   • COLORECTAL CANCER SCREENING  Never done   • ANNUAL PHYSICAL  Never done   • ZOSTER VACCINE (1 of 2) Never done   • HEPATITIS C SCREENING  Never done       <no information>  Last Completed Colonoscopy     This patient has no relevant Health Maintenance data.        Immunization History   Administered Date(s) Administered   • COVID-19 (MODERNA) 1st, 2nd, 3rd Dose Only 03/23/2021, 04/20/2021, 12/01/2021   • COVID-19 (MODERNA) BOOSTER 12/01/2021   • Flu Vaccine Split Quad 01/05/2009   • FluLaval/Fluarix/Fluzone >6 12/01/2021   • Influenza, Unspecified 12/01/2021   • TD Preservative Free 01/04/2008   • Tdap 08/16/2017     Last Completed Mammogram          MAMMOGRAM (Every 2 Years) Next due on 3/17/2024    03/17/2022  Mammo Diagnostic Digital Tomosynthesis Bilateral With CAD    03/01/2022  Mammo Screening Digital Tomosynthesis Bilateral With CAD    08/23/2017  Done                  FAMILY HISTORY:  Family History   Problem Relation Age of Onset   • Breast cancer Father    • Melanoma Sister    • Ovarian cancer Neg Hx    • Colon cancer Neg Hx    • Malig Hyperthermia Neg Hx      SOCIAL HISTORY:  Social History     Socioeconomic History   • Marital status:    Tobacco Use   • Smoking status: Never Smoker   • Smokeless tobacco: Never Used   Vaping Use   • Vaping Use: Never used   Substance and Sexual Activity   • Alcohol use: Yes     Alcohol/week: 1.0 standard drink     Types: 1 Cans of beer per week   • Drug use: Never   • Sexual activity: Yes     Partners: Male     Birth control/protection: None       REVIEW OF SYSTEMS:  Review of Systems   Constitutional: Positive for diaphoresis (after surgery for ~ 2 weeks, \"not this week.\"). Negative for chills, fatigue (\"after surgery but better\") and fever.        Manages her ADLs, to include chores, errands and driving.  Is up and " "about, \"all the time.\" Takes naps in the afternoon.  Is trying to start walking again.   HENT: Positive for postnasal drip.    Eyes: Negative.    Respiratory: Negative.    Cardiovascular: Negative.    Gastrointestinal: Negative.    Endocrine: Negative.  Negative for heat intolerance.   Genitourinary: Negative.  Negative for dysuria and hematuria.        \"Vaginal spotting\" post-op   Musculoskeletal: Negative.    Skin: Negative.    Allergic/Immunologic: Positive for environmental allergies (\"Hayfever\").        Allergic to sulfa   Neurological: Negative.    Hematological: Negative.    Psychiatric/Behavioral: Negative.        /84   Pulse 68   Temp 97.8 °F (36.6 °C)   Resp 16   Ht 165.1 cm (65\")   Wt 99.8 kg (220 lb 1.6 oz)   SpO2 97%   Breastfeeding No   BMI 36.63 kg/m²  Body surface area is 2.06 meters squared.  Pain Score    04/27/22 0913   PainSc: 0-No pain       Physical Exam:  Physical Exam  Vitals reviewed.   Constitutional:       Appearance: She is obese.      Comments: Pleasant, cooperative, obese, modestly kept female.  Ambulatory.  ECOG 0.  She is here alone   HENT:      Head: Normocephalic and atraumatic.      Mouth/Throat:      Comments: Is wearing a surgical mask today  Eyes:      General: No scleral icterus.     Extraocular Movements: Extraocular movements intact.      Pupils: Pupils are equal, round, and reactive to light.   Cardiovascular:      Rate and Rhythm: Normal rate.   Pulmonary:      Effort: Pulmonary effort is normal.   Abdominal:      Palpations: Abdomen is soft.      Tenderness: There is no abdominal tenderness.      Comments: Laparoscopy incisions are all well approximated and healing nicely.   Musculoskeletal:         General: Normal range of motion.      Cervical back: Normal range of motion.   Lymphadenopathy:      Cervical: No cervical adenopathy.   Skin:     General: Skin is warm.   Neurological:      General: No focal deficit present.      Mental Status: She is alert and " oriented to person, place, and time.   Psychiatric:         Mood and Affect: Mood normal.         Behavior: Behavior normal.         Thought Content: Thought content normal.           Assessment:  1.    Serous carcinoma of the endometrium  · Stage: FIGO IA -pT1a, pN0(sn), pMx.    · Tumor [place]: The tumor measures 4.2 x 3.5 x 1.5 cm. Extends into the myometrium a total of 6.25 mm out of a total thickness of 13 mm (48%).  Extends into the lower uterine segment but does not invade the stroma. 6 regional lymph nodes negative for tumor cells   · Complications of Tumor: Postmenopausal bleeding  · Mismatch repair (MMR) proteins: Pending  · p16: strong (+)  · Pax8:  strong (+)  · Her2: 1+  · ER/FL:(-)/(+) patchy  · Tumor Status:-- 04/07/2022- robotic assisted total laparoscopic hysterectomy, bilateral salpingo-oophorectomy, sentinel lymph node mapping by Dr. Jay.  · -Anticipate 6 cycles of adjuvant carboplatin and Taxol  +  Adjuvant radiation (EBRT + brachytherapy)          Plan:  1.   Re: Recapitulate the diagnostic information and the events that have transpired. A thorough review of the notes from Dr. Jay, previous imaging studies, operative notes and pathology from surgery on 04/07/2022.   2.  Review NCCN guidelines version 1.2022 endometrial carcinoma-biopsy findings: Serous carcinoma following primary treatment with KIMMY/BSO, surgical staging and maximal tumor debulking.  For invasive stage IA-additional treatment: Systemic therapy (preferred regimens: Carboplatin/paclitaxel- primary adjuvant treatment with use for uterine confined high risk disease) +/-EBRT+/-vaginal brachytherapy-followed by surveillance: Physical exam every 3-6 months for 2-3 years, then every 6 months for up to 5 years then annually.  CA-125 if initially elevated.  Imaging as clinically indicated (abdominal/pelvic and/or chest CT recommended based upon symptoms or physical exam findings).  3.  Draw baseline CMP, CBC with  differential, and CA-125  4.  Schedule staging CT scans of the chest, abdomen/pelvis with p.o. and IV contrast at [facility]   5.  Teaching sheets for carboplatin and Taxol   6.   Re: Toxicities of chemotherapy are noted. CARBOPLATIN (to include, but not limited to: Myelosuppression thrombocytopenia, anemia, leukopenia, and neutropenia, nausea, vomiting, electrolyte abnormalities, liver enzymes abnormalities, nephrotoxicity, O2 toxicity, neuropathy, pain, asthenia, paresthesias, abdominal pain, diarrhea, constipation, mucositis, bleeding, alopecia, vision loss, anaphylactoid reactions) and TAXOL (to include, but not limited to: Myelosuppression (neutropenia, anemia, thrombocytopenia), alopecia, neuropathy, arthralgia, myalgia, nausea, vomiting, hypersensitivity reactions, mucositis, diarrhea, infection, abnormal liver enzymes, asthenia, fever, hypertension, bleeding, edema, opportunistic infections, anaphylaxis, cardiac conduction disturbance/arrhythmias, syncope, thromboembolism, myocardial infarction, severe injection site reactions, pulmonary toxicity, GI obstruction/perforation, paralytic ileus, ischemic colitis, pancreatitis, hepatotoxicity, severe skin reactions). Questions answered to the patient's apparent satisfaction. She agrees to a trial of therapy.      7. Schedule (Plan: Every 21 days x6 cycles) C1, 05/16/2022; C2, 06/06/2022 at [facility].      · Carboplatin AUC of 6 (Total Dose = pending) every 3 weeks   · Taxol 175 mg/m2 (BSA = ; Total Dose =  mg ) every 3 weeks     8.  Premeds:   · Aloxi 0.25 mg IV   · Emend 150 mg IV   · Decadron 10 mg IV   · Pepcid 20 mg IV   · Benadryl 25 mg IV     9.   Neulasta 6 mg sc on day 2 of chemo at [facility].   10.  CMP, magnesium level, and CBC with differential weekly and on day of chemotherapy. Procrit 40,000 units subcutaneously if hemoglobin less than 10 and hematocrit less than 30. Zarxio/Neupogen 480 mcg daily x3 if ANC less than 1.0.   11.  Appoint to Dr. De La Cruz Re:  Port  "placement    12.  Rx:  Zofran 8 mg po tid prn # 30    13.  Continue other currently identified medications.   14.  Appoint to Dr. Miller Re:  Adjuvant radiation  15.  Consider sandwich chemo-RT-chemo regimen  16.  Return to the office on 06/15/2021with pre-office CBC with differential, CMP, .     I spent ~93 minutes caring for Rachel on this date of service. This time includes time spent by me in the following activities: preparing for the visit, reviewing tests, performing a medically appropriate examination and/or evaluation, counseling and educating the patient/family/caregiver, ordering medications, tests, or procedures and documenting information in the medical record    ADDENDUM:  --05/05/2022- CT abdomen/pelvis-Summary:  1. 3-4 cm soft tissue \"mass\" at the left pelvic sidewall for which  neoplastic lymphadenopathy is favored given the history of endometrial  carcinoma.  2. Mildly dilated right ureter extending into the upper pelvis where  there appears to be a focus of scarring. There is no high-grade ureteral  Obstruction.    --Tumor stage:  IIIC1 (pT1a, cN1a, M0). Her2-1+  -- NCCN guidelines version 1.2022 endometrial carcinoma following TH/BSO-stage III/IV-systemic therapy (preferred regimen: Carboplatin/paclitaxel)+/-EBRT+/- vaginal brachytherapy.  Surveillance as noted above  -- Plan:  -- Proceed with therapy as outlined above.  "

## 2022-04-25 PROBLEM — Z78.9 NON-SMOKER: Status: ACTIVE | Noted: 2022-04-25

## 2022-04-25 PROBLEM — Z90.710 S/P TAH-BSO (TOTAL ABDOMINAL HYSTERECTOMY AND BILATERAL SALPINGO-OOPHORECTOMY): Status: ACTIVE | Noted: 2022-04-25

## 2022-04-25 PROBLEM — Z90.722 S/P TAH-BSO (TOTAL ABDOMINAL HYSTERECTOMY AND BILATERAL SALPINGO-OOPHORECTOMY): Status: ACTIVE | Noted: 2022-04-25

## 2022-04-25 PROBLEM — Z90.79 S/P TAH-BSO (TOTAL ABDOMINAL HYSTERECTOMY AND BILATERAL SALPINGO-OOPHORECTOMY): Status: ACTIVE | Noted: 2022-04-25

## 2022-04-27 ENCOUNTER — CONSULT (OUTPATIENT)
Dept: RADIATION ONCOLOGY | Facility: HOSPITAL | Age: 62
End: 2022-04-27

## 2022-04-27 ENCOUNTER — TELEPHONE (OUTPATIENT)
Dept: ONCOLOGY | Facility: CLINIC | Age: 62
End: 2022-04-27

## 2022-04-27 ENCOUNTER — LAB (OUTPATIENT)
Dept: LAB | Facility: HOSPITAL | Age: 62
End: 2022-04-27

## 2022-04-27 ENCOUNTER — HOSPITAL ENCOUNTER (OUTPATIENT)
Dept: RADIATION ONCOLOGY | Facility: HOSPITAL | Age: 62
Setting detail: RADIATION/ONCOLOGY SERIES
End: 2022-04-27

## 2022-04-27 ENCOUNTER — CONSULT (OUTPATIENT)
Dept: ONCOLOGY | Facility: CLINIC | Age: 62
End: 2022-04-27

## 2022-04-27 VITALS
SYSTOLIC BLOOD PRESSURE: 136 MMHG | BODY MASS INDEX: 36.67 KG/M2 | DIASTOLIC BLOOD PRESSURE: 84 MMHG | HEART RATE: 68 BPM | HEIGHT: 65 IN | WEIGHT: 220.1 LBS | OXYGEN SATURATION: 97 % | RESPIRATION RATE: 16 BRPM | TEMPERATURE: 97.8 F

## 2022-04-27 VITALS — BODY MASS INDEX: 36.65 KG/M2 | HEIGHT: 65 IN | WEIGHT: 220 LBS

## 2022-04-27 DIAGNOSIS — C54.1 ENDOMETRIAL CARCINOMA: Primary | ICD-10-CM

## 2022-04-27 DIAGNOSIS — C54.1 ENDOMETRIAL CARCINOMA: ICD-10-CM

## 2022-04-27 DIAGNOSIS — Z90.722 S/P TAH-BSO (TOTAL ABDOMINAL HYSTERECTOMY AND BILATERAL SALPINGO-OOPHORECTOMY): ICD-10-CM

## 2022-04-27 DIAGNOSIS — Z90.79 S/P TAH-BSO (TOTAL ABDOMINAL HYSTERECTOMY AND BILATERAL SALPINGO-OOPHORECTOMY): ICD-10-CM

## 2022-04-27 DIAGNOSIS — Z78.9 NON-SMOKER: ICD-10-CM

## 2022-04-27 DIAGNOSIS — Z90.710 S/P TAH-BSO (TOTAL ABDOMINAL HYSTERECTOMY AND BILATERAL SALPINGO-OOPHORECTOMY): ICD-10-CM

## 2022-04-27 LAB
ALBUMIN SERPL-MCNC: 4 G/DL (ref 3.5–5.2)
ALBUMIN/GLOB SERPL: 1.3 G/DL
ALP SERPL-CCNC: 88 U/L (ref 39–117)
ALT SERPL W P-5'-P-CCNC: 26 U/L (ref 1–33)
ANION GAP SERPL CALCULATED.3IONS-SCNC: 11 MMOL/L (ref 5–15)
AST SERPL-CCNC: 25 U/L (ref 1–32)
BASOPHILS # BLD AUTO: 0.07 10*3/MM3 (ref 0–0.2)
BASOPHILS NFR BLD AUTO: 0.9 % (ref 0–1.5)
BILIRUB SERPL-MCNC: 0.2 MG/DL (ref 0–1.2)
BUN SERPL-MCNC: 12 MG/DL (ref 8–23)
BUN/CREAT SERPL: 19 (ref 7–25)
CALCIUM SPEC-SCNC: 9.6 MG/DL (ref 8.6–10.5)
CANCER AG125 SERPL QL: 37.1 U/ML (ref 0–38.1)
CHLORIDE SERPL-SCNC: 103 MMOL/L (ref 98–107)
CO2 SERPL-SCNC: 26 MMOL/L (ref 22–29)
CREAT SERPL-MCNC: 0.63 MG/DL (ref 0.57–1)
DEPRECATED RDW RBC AUTO: 41.1 FL (ref 37–54)
EGFRCR SERPLBLD CKD-EPI 2021: 99.8 ML/MIN/1.73
EOSINOPHIL # BLD AUTO: 0.43 10*3/MM3 (ref 0–0.4)
EOSINOPHIL NFR BLD AUTO: 5.8 % (ref 0.3–6.2)
ERYTHROCYTE [DISTWIDTH] IN BLOOD BY AUTOMATED COUNT: 13.2 % (ref 12.3–15.4)
GLOBULIN UR ELPH-MCNC: 3 GM/DL
GLUCOSE SERPL-MCNC: 101 MG/DL (ref 65–99)
HCT VFR BLD AUTO: 43.8 % (ref 34–46.6)
HGB BLD-MCNC: 14.5 G/DL (ref 12–15.9)
IMM GRANULOCYTES # BLD AUTO: 0.04 10*3/MM3 (ref 0–0.05)
IMM GRANULOCYTES NFR BLD AUTO: 0.5 % (ref 0–0.5)
LYMPHOCYTES # BLD AUTO: 1.59 10*3/MM3 (ref 0.7–3.1)
LYMPHOCYTES NFR BLD AUTO: 21.6 % (ref 19.6–45.3)
MAGNESIUM SERPL-MCNC: 2.4 MG/DL (ref 1.6–2.4)
MCH RBC QN AUTO: 28 PG (ref 26.6–33)
MCHC RBC AUTO-ENTMCNC: 33.1 G/DL (ref 31.5–35.7)
MCV RBC AUTO: 84.6 FL (ref 79–97)
MONOCYTES # BLD AUTO: 0.45 10*3/MM3 (ref 0.1–0.9)
MONOCYTES NFR BLD AUTO: 6.1 % (ref 5–12)
NEUTROPHILS NFR BLD AUTO: 4.79 10*3/MM3 (ref 1.7–7)
NEUTROPHILS NFR BLD AUTO: 65.1 % (ref 42.7–76)
NRBC BLD AUTO-RTO: 0 /100 WBC (ref 0–0.2)
PLATELET # BLD AUTO: 494 10*3/MM3 (ref 140–450)
PMV BLD AUTO: 9.3 FL (ref 6–12)
POTASSIUM SERPL-SCNC: 4.5 MMOL/L (ref 3.5–5.2)
PROT SERPL-MCNC: 7 G/DL (ref 6–8.5)
RBC # BLD AUTO: 5.18 10*6/MM3 (ref 3.77–5.28)
SODIUM SERPL-SCNC: 140 MMOL/L (ref 136–145)
WBC NRBC COR # BLD: 7.37 10*3/MM3 (ref 3.4–10.8)

## 2022-04-27 PROCEDURE — G0463 HOSPITAL OUTPT CLINIC VISIT: HCPCS | Performed by: RADIOLOGY

## 2022-04-27 PROCEDURE — 80053 COMPREHEN METABOLIC PANEL: CPT

## 2022-04-27 PROCEDURE — 86304 IMMUNOASSAY TUMOR CA 125: CPT

## 2022-04-27 PROCEDURE — 99215 OFFICE O/P EST HI 40 MIN: CPT | Performed by: INTERNAL MEDICINE

## 2022-04-27 PROCEDURE — 36415 COLL VENOUS BLD VENIPUNCTURE: CPT

## 2022-04-27 PROCEDURE — 83735 ASSAY OF MAGNESIUM: CPT

## 2022-04-27 PROCEDURE — 85025 COMPLETE CBC W/AUTO DIFF WBC: CPT

## 2022-04-27 RX ORDER — ONDANSETRON HYDROCHLORIDE 8 MG/1
8 TABLET, FILM COATED ORAL EVERY 8 HOURS PRN
Qty: 30 TABLET | Refills: 0 | Status: SHIPPED | OUTPATIENT
Start: 2022-04-27 | End: 2022-09-19 | Stop reason: SDUPTHER

## 2022-04-27 NOTE — TELEPHONE ENCOUNTER
Called and spoke with patient Rachel Sierra, she was notified that her PLT count was 494, Dr Lima feels this is a reactive process and would like to repeat the study in a couple of weeks.   Patient is already armaan 5/16/22, will recheck PLT count at that time, patient informed to please call if she has questions or concerns. She v/u.

## 2022-04-27 NOTE — TELEPHONE ENCOUNTER
----- Message from Js Lima MD sent at 4/27/2022 10:32 AM CDT -----  Platelets 494- reactive?  Repeat cbc in 2 weeks

## 2022-05-02 ENCOUNTER — HOSPITAL ENCOUNTER (OUTPATIENT)
Dept: RADIATION ONCOLOGY | Facility: HOSPITAL | Age: 62
Setting detail: RADIATION/ONCOLOGY SERIES
End: 2022-05-02

## 2022-05-04 ENCOUNTER — PATIENT ROUNDING (BHMG ONLY) (OUTPATIENT)
Dept: ONCOLOGY | Facility: CLINIC | Age: 62
End: 2022-05-04

## 2022-05-04 NOTE — PROGRESS NOTES
May 4, 2022    Hello, may I speak with Rachel Sierra?    My name is NICHOLAS      I am  with [facility] HEMATOLOGY & ONCOLOGY  [address]  [place] [facility] [address] [phone].    Before we get started may I verify your date of birth? 1960    I am calling to officially welcome you to our practice and ask about your recent visit. Is this a good time to talk? YES    Tell me about your visit with us. What things went well?  EVERYTHING WENT FINE       We're always looking for ways to make our patients' experiences even better. Do you have recommendations on ways we may improve?  NO I DON'T    Overall were you satisfied with your first visit to our practice? YES       I appreciate you taking the time to speak with me today. Is there anything else I can do for you? NO      Thank you, and have a great day.

## 2022-05-05 ENCOUNTER — TRANSCRIBE ORDERS (OUTPATIENT)
Dept: LAB | Facility: HOSPITAL | Age: 62
End: 2022-05-05

## 2022-05-05 ENCOUNTER — PRE-ADMISSION TESTING (OUTPATIENT)
Dept: PREADMISSION TESTING | Facility: HOSPITAL | Age: 62
End: 2022-05-05

## 2022-05-05 ENCOUNTER — HOSPITAL ENCOUNTER (OUTPATIENT)
Dept: CT IMAGING | Facility: HOSPITAL | Age: 62
Discharge: HOME | End: 2022-05-05

## 2022-05-05 VITALS
DIASTOLIC BLOOD PRESSURE: 67 MMHG | HEART RATE: 60 BPM | RESPIRATION RATE: 18 BRPM | SYSTOLIC BLOOD PRESSURE: 145 MMHG | BODY MASS INDEX: 35.18 KG/M2 | HEIGHT: 66 IN | WEIGHT: 218.92 LBS | OXYGEN SATURATION: 98 %

## 2022-05-05 DIAGNOSIS — Z11.59 SCREENING FOR VIRAL DISEASE: Primary | ICD-10-CM

## 2022-05-05 DIAGNOSIS — C54.1 ENDOMETRIAL CARCINOMA: ICD-10-CM

## 2022-05-05 LAB
ALBUMIN SERPL-MCNC: 4.1 G/DL (ref 3.5–5.2)
ALBUMIN/GLOB SERPL: 1.4 G/DL
ALP SERPL-CCNC: 85 U/L (ref 39–117)
ALT SERPL W P-5'-P-CCNC: 24 U/L (ref 1–33)
ANION GAP SERPL CALCULATED.3IONS-SCNC: 10 MMOL/L (ref 5–15)
AST SERPL-CCNC: 21 U/L (ref 1–32)
BASOPHILS # BLD AUTO: 0.09 10*3/MM3 (ref 0–0.2)
BASOPHILS NFR BLD AUTO: 1.6 % (ref 0–1.5)
BILIRUB SERPL-MCNC: 0.3 MG/DL (ref 0–1.2)
BUN SERPL-MCNC: 12 MG/DL (ref 8–23)
BUN/CREAT SERPL: 19 (ref 7–25)
CALCIUM SPEC-SCNC: 9.3 MG/DL (ref 8.6–10.5)
CHLORIDE SERPL-SCNC: 102 MMOL/L (ref 98–107)
CO2 SERPL-SCNC: 26 MMOL/L (ref 22–29)
CREAT SERPL-MCNC: 0.63 MG/DL (ref 0.57–1)
DEPRECATED RDW RBC AUTO: 42.6 FL (ref 37–54)
EGFRCR SERPLBLD CKD-EPI 2021: 99.8 ML/MIN/1.73
EOSINOPHIL # BLD AUTO: 0.28 10*3/MM3 (ref 0–0.4)
EOSINOPHIL NFR BLD AUTO: 4.9 % (ref 0.3–6.2)
ERYTHROCYTE [DISTWIDTH] IN BLOOD BY AUTOMATED COUNT: 13.5 % (ref 12.3–15.4)
GLOBULIN UR ELPH-MCNC: 3 GM/DL
GLUCOSE SERPL-MCNC: 86 MG/DL (ref 65–99)
HCT VFR BLD AUTO: 43.8 % (ref 34–46.6)
HGB BLD-MCNC: 14 G/DL (ref 12–15.9)
IMM GRANULOCYTES # BLD AUTO: 0.02 10*3/MM3 (ref 0–0.05)
IMM GRANULOCYTES NFR BLD AUTO: 0.4 % (ref 0–0.5)
LYMPHOCYTES # BLD AUTO: 1.68 10*3/MM3 (ref 0.7–3.1)
LYMPHOCYTES NFR BLD AUTO: 29.7 % (ref 19.6–45.3)
MCH RBC QN AUTO: 27.5 PG (ref 26.6–33)
MCHC RBC AUTO-ENTMCNC: 32 G/DL (ref 31.5–35.7)
MCV RBC AUTO: 86.1 FL (ref 79–97)
MONOCYTES # BLD AUTO: 0.41 10*3/MM3 (ref 0.1–0.9)
MONOCYTES NFR BLD AUTO: 7.2 % (ref 5–12)
NEUTROPHILS NFR BLD AUTO: 3.18 10*3/MM3 (ref 1.7–7)
NEUTROPHILS NFR BLD AUTO: 56.2 % (ref 42.7–76)
NRBC BLD AUTO-RTO: 0 /100 WBC (ref 0–0.2)
PLATELET # BLD AUTO: 393 10*3/MM3 (ref 140–450)
PMV BLD AUTO: 9.9 FL (ref 6–12)
POTASSIUM SERPL-SCNC: 4.6 MMOL/L (ref 3.5–5.2)
PROT SERPL-MCNC: 7.1 G/DL (ref 6–8.5)
RBC # BLD AUTO: 5.09 10*6/MM3 (ref 3.77–5.28)
SODIUM SERPL-SCNC: 138 MMOL/L (ref 136–145)
WBC NRBC COR # BLD: 5.66 10*3/MM3 (ref 3.4–10.8)

## 2022-05-05 PROCEDURE — 80053 COMPREHEN METABOLIC PANEL: CPT

## 2022-05-05 PROCEDURE — 85025 COMPLETE CBC W/AUTO DIFF WBC: CPT

## 2022-05-05 PROCEDURE — 74177 CT ABD & PELVIS W/CONTRAST: CPT

## 2022-05-05 PROCEDURE — 71260 CT THORAX DX C+: CPT

## 2022-05-05 PROCEDURE — 25010000002 IOPAMIDOL 61 % SOLUTION: Performed by: INTERNAL MEDICINE

## 2022-05-05 PROCEDURE — 36415 COLL VENOUS BLD VENIPUNCTURE: CPT

## 2022-05-05 RX ADMIN — IOPAMIDOL 100 ML: 612 INJECTION, SOLUTION INTRAVENOUS at 12:48

## 2022-05-05 NOTE — DISCHARGE INSTRUCTIONS
Before you come to the hospital        Arrival time: AS DIRECTED BY OFFICE     YOU MAY TAKE THE FOLLOWING MEDICATION(S) THE MORNING OF SURGERY WITH A SIP OF WATER: NONE           ALL OTHER HOME MEDICATION CHECK WITH YOUR PHYSICIAN (especially if you are taking diabetes medicines or blood thinners)    Do not take any Erectile Dysfunction medications (EX: CIALIS, VIAGRA) 24 hours prior to surgery      If you were given and instructed to use a germ- killing soap, use as directed the night before surgery and the morning of surgery before coming to the hospital.       Eating and drinking restrictions  (It is extremely important that you follow these guidelines to prevent delay or cancelation of your procedure)   Up to 2 hours prior to the time you are told to arrive to the hospital - you may continue to drink clear liquids, such as water, clear fruit juice (no pulp), black coffee, and plain tea.          Eating and drinking restrictions prior to scheduled arrival time  Clear liquids (water, apple juice-no pulp) - 2 hours   Breast milk - 4 hours   Milk or drinks that contain milk, full liquids-  6 hours   Light meals or foods, such as toast or cereal- 6 hours   Heavy foods, such as meat, fried foods or fatty foods- 8 hours       Clear Liquids  Water and flavored water                                                                       Sugar water.  Ice or frozen ice pops.  Clear Fruit juices, such as cranberry juice and apple juice.  Black coffee.  Plain tea  Clear bouillon or broth.  Broth-based soups that have been strained.  Flavored gelatin.  Soda.  Gatorade or Powerade.  Full liquid examples  Juices that have pulp.  Frozen ice pops that contain fruit pieces.  Coffee with creamer  Milk.  Cream or cream-based soups.  Yogurt.              MANAGING PAIN AFTER SURGERY    We know you are probably wondering what your pain will be like after surgery.  Following surgery it is unrealistic to expect you will not have pain.    Pain is how our bodies let us know that something is wrong or cautions us to be careful.  That said, our goal is to make your pain tolerable.    Methods we may use to treat your pain include (oral or IV medications, PCAs, epidurals, nerve blocks, etc.)   While some procedures require IV pain medications for a short time after surgery, transitioning to pain medications by mouth allows for better management of pain.   Your nurse will encourage you to take oral pain medications whenever possible.  IV medications work almost immediately, but only last a short while.  Taking medications by mouth allows for a more constant level of medication in your blood stream for a longer period of time.      Once your pain is out of control it is harder to get back under control.  It is important you are aware when your next dose of pain medication is due.  If you are admitted, your nurse may write the time of your next dose on the white board in your room to help you remember.      We are interested in your pain and encourage you to inform us about aggravating factors during your visit.   Many times a simple repositioning every few hours can make a big difference.    If your physician says it is okay, do not let your pain prevent you from getting out of bed. Be sure to call your nurse for assistance prior to getting up so you do not fall.      Before surgery, please decide your tolerable pain goal.  These faces help describe the pain ratings we use on a 0-10 scale.   Be prepared to tell us your goal and whether or not you take pain or anxiety medications at home.

## 2022-05-06 ENCOUNTER — OFFICE VISIT (OUTPATIENT)
Dept: ONCOLOGY | Facility: CLINIC | Age: 62
End: 2022-05-06

## 2022-05-06 ENCOUNTER — LAB (OUTPATIENT)
Dept: LAB | Facility: HOSPITAL | Age: 62
End: 2022-05-06

## 2022-05-06 DIAGNOSIS — C54.1 ENDOMETRIAL CARCINOMA: Primary | ICD-10-CM

## 2022-05-06 DIAGNOSIS — Z90.722 S/P TAH-BSO (TOTAL ABDOMINAL HYSTERECTOMY AND BILATERAL SALPINGO-OOPHORECTOMY): ICD-10-CM

## 2022-05-06 DIAGNOSIS — C54.1 ENDOMETRIAL CARCINOMA: ICD-10-CM

## 2022-05-06 DIAGNOSIS — Z90.710 S/P TAH-BSO (TOTAL ABDOMINAL HYSTERECTOMY AND BILATERAL SALPINGO-OOPHORECTOMY): ICD-10-CM

## 2022-05-06 DIAGNOSIS — Z90.79 S/P TAH-BSO (TOTAL ABDOMINAL HYSTERECTOMY AND BILATERAL SALPINGO-OOPHORECTOMY): ICD-10-CM

## 2022-05-06 LAB — SARS-COV-2 ORF1AB RESP QL NAA+PROBE: NOT DETECTED

## 2022-05-06 PROCEDURE — C9803 HOPD COVID-19 SPEC COLLECT: HCPCS

## 2022-05-06 PROCEDURE — U0004 COV-19 TEST NON-CDC HGH THRU: HCPCS | Performed by: SPECIALIST

## 2022-05-06 PROCEDURE — 98960 EDU&TRN PT SELF-MGMT NQHP 1: CPT | Performed by: INTERNAL MEDICINE

## 2022-05-06 RX ORDER — LIDOCAINE AND PRILOCAINE 25; 25 MG/G; MG/G
CREAM TOPICAL
Qty: 1 EACH | Refills: 2 | Status: SHIPPED | OUTPATIENT
Start: 2022-05-06 | End: 2023-02-07

## 2022-05-06 NOTE — PROGRESS NOTES
Subjective     PATIENT NAME:  Rachel Sierra  YOB: 1960  PATIENTS AGE:  61 y.o.  PATIENTS SEX:  female  DATE OF SERVICE:  05/06/2022  PROVIDER:  [facility] NURSE      ____________________PATIENT EDUCATION____________________    PATIENT EDUCATION:  Today I met with the patient Rachel Sierra, to discuss the chemotherapy regimen CARBO/TAXOL/NEULASTA recommended for treatment of her disease ENDOMETRIAL CARCINOMA.  The patient was given explanation of treatment premed side effects including office policy that prohibits patients to drive if sedating medications are administered, MD explanation given regarding benefits, side effects, toxicities and goals of treatment.  The patient received a Chemotherapy/Biotherapy Plan Summary including diagnosis and specific treatment plan.    SIDE EFFECTS:  Common side effects were discussed with the patient  Rachel Sierra.  Discussion included hair loss/discoloration, anemia/fatigue, infection/chills/fever, appetite, bleeding risk/precautions, constipation, diarrhea, mouth sores, taste alteration, loss of appetite,nausea/vomiting, peripheral neuropathy, skin/nail changes, rash, muscle aches/weakness, photosensitivity, weight gain/loss, hearing loss, dizziness, high blood pressure, heart damage, liver damage, lung damage, kidney damage, DVT/PE risk, fluid retention, pleural/pericardial effusion, somnolence, electrolyte/LFT imbalance, vein exercises and/or the possible need for vascular access/port placement.  The patient was advice that although uncommon, leakage of an infused medication from the vein or venous access device (port) may lead to skin breakdown and/or other tissue damage.  The patient was advised that he/she may have pain, bleeding, and/or bruising from the insertion of a needle in their vein or venous access device (port).  The patient was further advised that, in spite of proper technique, infection with redness and irritation may rarely  occur at the site where the needle was inserted.  The patient was advised that if complications occur, additional medical treatment is available.    Discussion also included side effects specific to drugs in the treatment plan, specifically: CARBOPLATIN/TAXOL/NEULASTA     Protection during sexual relations.    A total of  60 minutes were spent with the patient, with 100% of time spent in education and counseling.

## 2022-05-09 ENCOUNTER — APPOINTMENT (OUTPATIENT)
Dept: GENERAL RADIOLOGY | Facility: HOSPITAL | Age: 62
End: 2022-05-09

## 2022-05-09 ENCOUNTER — PATIENT MESSAGE (OUTPATIENT)
Dept: ONCOLOGY | Facility: CLINIC | Age: 62
End: 2022-05-09

## 2022-05-09 ENCOUNTER — ANESTHESIA (OUTPATIENT)
Dept: PERIOP | Facility: HOSPITAL | Age: 62
End: 2022-05-09

## 2022-05-09 ENCOUNTER — HOSPITAL ENCOUNTER (OUTPATIENT)
Facility: HOSPITAL | Age: 62
Setting detail: HOSPITAL OUTPATIENT SURGERY
Discharge: HOME | End: 2022-05-09
Attending: SPECIALIST | Admitting: SPECIALIST

## 2022-05-09 ENCOUNTER — ANESTHESIA EVENT (OUTPATIENT)
Dept: PERIOP | Facility: HOSPITAL | Age: 62
End: 2022-05-09

## 2022-05-09 VITALS
DIASTOLIC BLOOD PRESSURE: 54 MMHG | HEART RATE: 51 BPM | RESPIRATION RATE: 16 BRPM | TEMPERATURE: 97.2 F | OXYGEN SATURATION: 96 % | SYSTOLIC BLOOD PRESSURE: 106 MMHG

## 2022-05-09 PROCEDURE — C1788 PORT, INDWELLING, IMP: HCPCS | Performed by: SPECIALIST

## 2022-05-09 PROCEDURE — 25010000002 PROPOFOL 10 MG/ML EMULSION: Performed by: NURSE ANESTHETIST, CERTIFIED REGISTERED

## 2022-05-09 PROCEDURE — 25010000002 HEPARIN LOCK FLUSH PER 10 UNITS: Performed by: SPECIALIST

## 2022-05-09 PROCEDURE — 76000 FLUOROSCOPY <1 HR PHYS/QHP: CPT

## 2022-05-09 PROCEDURE — 25010000002 FENTANYL CITRATE (PF) 50 MCG/ML SOLUTION: Performed by: ANESTHESIOLOGY

## 2022-05-09 PROCEDURE — 25010000002 MIDAZOLAM PER 1 MG: Performed by: ANESTHESIOLOGY

## 2022-05-09 PROCEDURE — 0 LIDOCAINE 1 % SOLUTION: Performed by: SPECIALIST

## 2022-05-09 PROCEDURE — 71045 X-RAY EXAM CHEST 1 VIEW: CPT

## 2022-05-09 PROCEDURE — 25010000002 VANCOMYCIN 1 G RECONSTITUTED SOLUTION 1 EACH VIAL: Performed by: SPECIALIST

## 2022-05-09 PROCEDURE — 25010000002 CEFAZOLIN PER 500 MG: Performed by: SPECIALIST

## 2022-05-09 DEVICE — PRT INTRO VASC/INTERV VORTEX FILL/HL DETACH/POLYURET/CATH 8F: Type: IMPLANTABLE DEVICE | Site: CHEST | Status: FUNCTIONAL

## 2022-05-09 RX ORDER — ONDANSETRON 4 MG/1
4 TABLET, FILM COATED ORAL ONCE AS NEEDED
Status: DISCONTINUED | OUTPATIENT
Start: 2022-05-09 | End: 2022-05-09 | Stop reason: HOSPADM

## 2022-05-09 RX ORDER — PROMETHAZINE HYDROCHLORIDE 25 MG/1
12.5 TABLET ORAL ONCE AS NEEDED
Status: DISCONTINUED | OUTPATIENT
Start: 2022-05-09 | End: 2022-05-09 | Stop reason: HOSPADM

## 2022-05-09 RX ORDER — LIDOCAINE HYDROCHLORIDE 10 MG/ML
0.5 INJECTION, SOLUTION EPIDURAL; INFILTRATION; INTRACAUDAL; PERINEURAL ONCE AS NEEDED
Status: DISCONTINUED | OUTPATIENT
Start: 2022-05-09 | End: 2022-05-09 | Stop reason: HOSPADM

## 2022-05-09 RX ORDER — HYDROMORPHONE HYDROCHLORIDE 1 MG/ML
0.5 INJECTION, SOLUTION INTRAMUSCULAR; INTRAVENOUS; SUBCUTANEOUS
Status: DISCONTINUED | OUTPATIENT
Start: 2022-05-09 | End: 2022-05-09 | Stop reason: HOSPADM

## 2022-05-09 RX ORDER — MIDAZOLAM HYDROCHLORIDE 1 MG/ML
1 INJECTION INTRAMUSCULAR; INTRAVENOUS
Status: COMPLETED | OUTPATIENT
Start: 2022-05-09 | End: 2022-05-09

## 2022-05-09 RX ORDER — SODIUM CHLORIDE 0.9 % (FLUSH) 0.9 %
10 SYRINGE (ML) INJECTION AS NEEDED
Status: DISCONTINUED | OUTPATIENT
Start: 2022-05-09 | End: 2022-05-09 | Stop reason: HOSPADM

## 2022-05-09 RX ORDER — IBUPROFEN 600 MG/1
600 TABLET ORAL ONCE AS NEEDED
Status: DISCONTINUED | OUTPATIENT
Start: 2022-05-09 | End: 2022-05-09 | Stop reason: HOSPADM

## 2022-05-09 RX ORDER — SODIUM CHLORIDE 0.9 % (FLUSH) 0.9 %
3 SYRINGE (ML) INJECTION AS NEEDED
Status: DISCONTINUED | OUTPATIENT
Start: 2022-05-09 | End: 2022-05-09 | Stop reason: HOSPADM

## 2022-05-09 RX ORDER — SODIUM CHLORIDE, SODIUM LACTATE, POTASSIUM CHLORIDE, CALCIUM CHLORIDE 600; 310; 30; 20 MG/100ML; MG/100ML; MG/100ML; MG/100ML
1000 INJECTION, SOLUTION INTRAVENOUS CONTINUOUS
Status: DISCONTINUED | OUTPATIENT
Start: 2022-05-09 | End: 2022-05-09 | Stop reason: HOSPADM

## 2022-05-09 RX ORDER — DROPERIDOL 2.5 MG/ML
0.62 INJECTION, SOLUTION INTRAMUSCULAR; INTRAVENOUS ONCE AS NEEDED
Status: DISCONTINUED | OUTPATIENT
Start: 2022-05-09 | End: 2022-05-09 | Stop reason: HOSPADM

## 2022-05-09 RX ORDER — SODIUM CHLORIDE, SODIUM LACTATE, POTASSIUM CHLORIDE, CALCIUM CHLORIDE 600; 310; 30; 20 MG/100ML; MG/100ML; MG/100ML; MG/100ML
9 INJECTION, SOLUTION INTRAVENOUS CONTINUOUS
Status: DISCONTINUED | OUTPATIENT
Start: 2022-05-09 | End: 2022-05-09 | Stop reason: HOSPADM

## 2022-05-09 RX ORDER — FLUMAZENIL 0.1 MG/ML
0.2 INJECTION INTRAVENOUS AS NEEDED
Status: DISCONTINUED | OUTPATIENT
Start: 2022-05-09 | End: 2022-05-09 | Stop reason: HOSPADM

## 2022-05-09 RX ORDER — DEXTROSE MONOHYDRATE 25 G/50ML
12.5 INJECTION, SOLUTION INTRAVENOUS AS NEEDED
Status: DISCONTINUED | OUTPATIENT
Start: 2022-05-09 | End: 2022-05-09 | Stop reason: HOSPADM

## 2022-05-09 RX ORDER — FENTANYL CITRATE 50 UG/ML
25 INJECTION, SOLUTION INTRAMUSCULAR; INTRAVENOUS
Status: DISCONTINUED | OUTPATIENT
Start: 2022-05-09 | End: 2022-05-09 | Stop reason: HOSPADM

## 2022-05-09 RX ORDER — LABETALOL HYDROCHLORIDE 5 MG/ML
5 INJECTION, SOLUTION INTRAVENOUS
Status: DISCONTINUED | OUTPATIENT
Start: 2022-05-09 | End: 2022-05-09 | Stop reason: HOSPADM

## 2022-05-09 RX ORDER — LIDOCAINE HYDROCHLORIDE 10 MG/ML
INJECTION, SOLUTION INFILTRATION; PERINEURAL AS NEEDED
Status: DISCONTINUED | OUTPATIENT
Start: 2022-05-09 | End: 2022-05-09 | Stop reason: HOSPADM

## 2022-05-09 RX ORDER — ONDANSETRON 2 MG/ML
4 INJECTION INTRAMUSCULAR; INTRAVENOUS ONCE AS NEEDED
Status: DISCONTINUED | OUTPATIENT
Start: 2022-05-09 | End: 2022-05-09 | Stop reason: HOSPADM

## 2022-05-09 RX ORDER — OXYCODONE AND ACETAMINOPHEN 10; 325 MG/1; MG/1
1 TABLET ORAL ONCE AS NEEDED
Status: DISCONTINUED | OUTPATIENT
Start: 2022-05-09 | End: 2022-05-09 | Stop reason: HOSPADM

## 2022-05-09 RX ORDER — HEPARIN SODIUM (PORCINE) LOCK FLUSH IV SOLN 100 UNIT/ML 100 UNIT/ML
SOLUTION INTRAVENOUS AS NEEDED
Status: DISCONTINUED | OUTPATIENT
Start: 2022-05-09 | End: 2022-05-09 | Stop reason: HOSPADM

## 2022-05-09 RX ORDER — NALOXONE HCL 0.4 MG/ML
0.4 VIAL (ML) INJECTION AS NEEDED
Status: DISCONTINUED | OUTPATIENT
Start: 2022-05-09 | End: 2022-05-09 | Stop reason: HOSPADM

## 2022-05-09 RX ORDER — PROPOFOL 10 MG/ML
VIAL (ML) INTRAVENOUS AS NEEDED
Status: DISCONTINUED | OUTPATIENT
Start: 2022-05-09 | End: 2022-05-09 | Stop reason: SURG

## 2022-05-09 RX ORDER — LIDOCAINE HYDROCHLORIDE 20 MG/ML
INJECTION, SOLUTION EPIDURAL; INFILTRATION; INTRACAUDAL; PERINEURAL AS NEEDED
Status: DISCONTINUED | OUTPATIENT
Start: 2022-05-09 | End: 2022-05-09 | Stop reason: SURG

## 2022-05-09 RX ORDER — SODIUM CHLORIDE 0.9 % (FLUSH) 0.9 %
10 SYRINGE (ML) INJECTION EVERY 12 HOURS SCHEDULED
Status: DISCONTINUED | OUTPATIENT
Start: 2022-05-09 | End: 2022-05-09 | Stop reason: HOSPADM

## 2022-05-09 RX ORDER — OXYCODONE AND ACETAMINOPHEN 7.5; 325 MG/1; MG/1
2 TABLET ORAL EVERY 4 HOURS PRN
Status: DISCONTINUED | OUTPATIENT
Start: 2022-05-09 | End: 2022-05-09 | Stop reason: HOSPADM

## 2022-05-09 RX ADMIN — FENTANYL CITRATE 50 MCG: 50 INJECTION, SOLUTION INTRAMUSCULAR; INTRAVENOUS at 07:22

## 2022-05-09 RX ADMIN — LIDOCAINE HYDROCHLORIDE 50 MG: 20 INJECTION, SOLUTION EPIDURAL; INFILTRATION; INTRACAUDAL; PERINEURAL at 07:07

## 2022-05-09 RX ADMIN — MIDAZOLAM 1 MG: 1 INJECTION INTRAMUSCULAR; INTRAVENOUS at 06:45

## 2022-05-09 RX ADMIN — PROPOFOL 50 MG: 10 INJECTION, EMULSION INTRAVENOUS at 07:07

## 2022-05-09 RX ADMIN — FENTANYL CITRATE 50 MCG: 50 INJECTION, SOLUTION INTRAMUSCULAR; INTRAVENOUS at 07:06

## 2022-05-09 RX ADMIN — SODIUM CHLORIDE, POTASSIUM CHLORIDE, SODIUM LACTATE AND CALCIUM CHLORIDE 1000 ML: 600; 310; 30; 20 INJECTION, SOLUTION INTRAVENOUS at 06:13

## 2022-05-09 RX ADMIN — PROPOFOL 85 MCG/KG/MIN: 10 INJECTION, EMULSION INTRAVENOUS at 07:09

## 2022-05-09 RX ADMIN — MIDAZOLAM 1 MG: 1 INJECTION INTRAMUSCULAR; INTRAVENOUS at 06:55

## 2022-05-09 NOTE — ANESTHESIA PREPROCEDURE EVALUATION
Anesthesia Evaluation     Patient summary reviewed   history of anesthetic complications: PONV  NPO Solid Status: > 8 hours             Airway   Mallampati: II  Dental      Pulmonary    (-) COPD, asthma, sleep apnea, not a smoker  Cardiovascular   Exercise tolerance: good (4-7 METS)    (-) pacemaker, past MI, angina, cardiac stents      Neuro/Psych  (-) seizures, TIA, CVA  GI/Hepatic/Renal/Endo    (+) obesity,     (-) GERD, liver disease, no renal disease, diabetes    Musculoskeletal     Abdominal    Substance History      OB/GYN          Other      history of cancer                    Anesthesia Plan    ASA 2     MAC       Anesthetic plan, all risks, benefits, and alternatives have been provided, discussed and informed consent has been obtained with: patient.        CODE STATUS:

## 2022-05-09 NOTE — ANESTHESIA POSTPROCEDURE EVALUATION
Patient: Rachel Sierra    Procedure Summary     Date: 05/09/22 Room / Location:  [facility]  /  [facility]    Anesthesia Start: 0703 Anesthesia Stop: 0744    Procedure: SINGLE LUMEN PORT PLACEMENT (N/A Chin to Nipples) Diagnosis: (ENDOMETRIAL CANCER)    Surgeons: Flora De La Cruz MD Provider: Carlito De Souza CRNA    Anesthesia Type: MAC ASA Status: 2          Anesthesia Type: MAC    Vitals  Vitals Value Taken Time   /54 05/09/22 0816   Temp 97.2 °F (36.2 °C) 05/09/22 0738   Pulse 51 05/09/22 0821   Resp 16 05/09/22 0745   SpO2 95 % 05/09/22 0821   Vitals shown include unvalidated device data.        Post Anesthesia Care and Evaluation    Patient location during evaluation: PACU  Patient participation: complete - patient participated  Level of consciousness: awake and alert  Pain management: adequate  Airway patency: patent  Anesthetic complications: No anesthetic complications    Cardiovascular status: acceptable  Respiratory status: acceptable  Hydration status: acceptable    Comments: Blood pressure 106/54, pulse 51, temperature 97.2 °F (36.2 °C), temperature source Temporal, resp. rate 16, SpO2 96 %, not currently breastfeeding.    Pt discharged from PACU based on opal score >8

## 2022-05-09 NOTE — OP NOTE
INSERTION VENOUS ACCESS DEVICE  Procedure Note    Rachel Sierra  5/9/2022    Pre-op Diagnosis:   ENDOMETRIAL CANCER    Post-op Diagnosis:     same    Procedure/CPT® Codes:      Procedure(s):  SINGLE LUMEN PORT PLACEMENT    Surgeon(s):  Flora De La Cruz MD    Anesthesia: Monitored Anesthesia Care    Staff:   Circulator: Aaron Corbin RN; Dayton Toney RN  Scrub Person: Natasha King; Emil Dyer    Estimated Blood Loss: minimal    Specimens: none                     Access site: left subclavian vein        Complications: none          Date: 5/9/2022  Time: 07:35 CDT  The patient was brought to the operating room and placed in the supine position. After IV sedation and infusion of IV antibiotics, the patient was prepped and draped in the usual sterile fashion. Lidocaine 1% was used for local anesthesia. The vein was accessed, the wire passed. Fluoroscopy revealed it to be in good position. The pocket was incised, developed. The catheter was tunneled, cut to size, secured to the port, and the port sutured in place with 0 Prolene. Sheath passed, catheter was fed. Fluoroscopy revealed it to be in good position. Good flush and flow obtained. The wound was closed with 3-0 and 4-0 Vicryl sutures. Dressing was placed. The patient was awakened and transferred to the recovery room in stable condition, tolerated the procedure well. At the end of the procedure, all counts were correct.       Flora De La Cruz MD

## 2022-05-11 ENCOUNTER — TELEPHONE (OUTPATIENT)
Dept: ONCOLOGY | Facility: CLINIC | Age: 62
End: 2022-05-11

## 2022-05-11 NOTE — TELEPHONE ENCOUNTER
----- Message from Js Lima MD sent at 5/11/2022 10:21 AM CDT -----  Regarding: FW: Question regarding CT ABDOMEN AND PELVIS W IV CONTRAST  She already had surgery.  ----- Message -----  From: Maddie Pop MA  Sent: 5/11/2022  10:05 AM CDT  To: Js Lima MD  Subject: FW: Question regarding CT ABDOMEN AND PELVIS#    Please advise.   ----- Message -----  From: Rachel Sierra  Sent: 5/11/2022   9:54 AM CDT  To: [facility]  Subject: Question regarding CT ABDOMEN AND PELVIS W I#    Is surgery to remove the tumor appropriate?

## 2022-05-11 NOTE — TELEPHONE ENCOUNTER
Attempted to call patient to let her know that the surgery Dr. Lima was referring to is the surgery that she has already had on 04/07/2022 the total hysterectomy.

## 2022-05-11 NOTE — TELEPHONE ENCOUNTER
Patient called back and asked if the current mass seen on the CT scan would be appropriate to remove surgically.

## 2022-05-12 RX ORDER — SODIUM CHLORIDE 9 MG/ML
250 INJECTION, SOLUTION INTRAVENOUS ONCE
Status: CANCELLED | OUTPATIENT
Start: 2022-05-16

## 2022-05-12 RX ORDER — DIPHENHYDRAMINE HYDROCHLORIDE 50 MG/ML
25 INJECTION INTRAMUSCULAR; INTRAVENOUS ONCE
Status: CANCELLED
Start: 2022-05-16 | End: 2022-05-16

## 2022-05-12 RX ORDER — FAMOTIDINE 10 MG/ML
20 INJECTION, SOLUTION INTRAVENOUS ONCE
Status: CANCELLED | OUTPATIENT
Start: 2022-05-16

## 2022-05-12 RX ORDER — PALONOSETRON 0.05 MG/ML
0.25 INJECTION, SOLUTION INTRAVENOUS ONCE
Status: CANCELLED | OUTPATIENT
Start: 2022-05-16

## 2022-05-12 RX ORDER — FAMOTIDINE 10 MG/ML
20 INJECTION, SOLUTION INTRAVENOUS AS NEEDED
Status: CANCELLED | OUTPATIENT
Start: 2022-05-16

## 2022-05-12 RX ORDER — DIPHENHYDRAMINE HYDROCHLORIDE 50 MG/ML
50 INJECTION INTRAMUSCULAR; INTRAVENOUS AS NEEDED
Status: CANCELLED | OUTPATIENT
Start: 2022-05-16

## 2022-05-12 RX ORDER — FAMOTIDINE 10 MG/ML
20 INJECTION, SOLUTION INTRAVENOUS ONCE
Status: CANCELLED
Start: 2022-05-16 | End: 2022-05-16

## 2022-05-16 ENCOUNTER — LAB (OUTPATIENT)
Dept: LAB | Facility: HOSPITAL | Age: 62
End: 2022-05-16

## 2022-05-16 ENCOUNTER — INFUSION (OUTPATIENT)
Dept: ONCOLOGY | Facility: HOSPITAL | Age: 62
End: 2022-05-16

## 2022-05-16 ENCOUNTER — TELEPHONE (OUTPATIENT)
Dept: ONCOLOGY | Facility: CLINIC | Age: 62
End: 2022-05-16

## 2022-05-16 VITALS
SYSTOLIC BLOOD PRESSURE: 135 MMHG | TEMPERATURE: 97.5 F | HEART RATE: 49 BPM | DIASTOLIC BLOOD PRESSURE: 56 MMHG | OXYGEN SATURATION: 97 % | BODY MASS INDEX: 36.32 KG/M2 | RESPIRATION RATE: 15 BRPM | HEIGHT: 65 IN | WEIGHT: 218 LBS

## 2022-05-16 DIAGNOSIS — Z45.2 FITTING AND ADJUSTMENT OF VASCULAR CATHETER: ICD-10-CM

## 2022-05-16 DIAGNOSIS — C54.1 ENDOMETRIAL CARCINOMA: Primary | ICD-10-CM

## 2022-05-16 DIAGNOSIS — C54.1 ENDOMETRIAL CARCINOMA: ICD-10-CM

## 2022-05-16 LAB
ALBUMIN SERPL-MCNC: 4.2 G/DL (ref 3.5–5.2)
ALBUMIN/GLOB SERPL: 1.6 G/DL
ALP SERPL-CCNC: 78 U/L (ref 39–117)
ALT SERPL W P-5'-P-CCNC: 23 U/L (ref 1–33)
ANION GAP SERPL CALCULATED.3IONS-SCNC: 8 MMOL/L (ref 5–15)
AST SERPL-CCNC: 22 U/L (ref 1–32)
BASOPHILS # BLD AUTO: 0.06 10*3/MM3 (ref 0–0.2)
BASOPHILS NFR BLD AUTO: 1.1 % (ref 0–1.5)
BILIRUB SERPL-MCNC: 0.4 MG/DL (ref 0–1.2)
BUN SERPL-MCNC: 16 MG/DL (ref 8–23)
BUN/CREAT SERPL: 21.3 (ref 7–25)
CALCIUM SPEC-SCNC: 9.1 MG/DL (ref 8.6–10.5)
CHLORIDE SERPL-SCNC: 104 MMOL/L (ref 98–107)
CO2 SERPL-SCNC: 27 MMOL/L (ref 22–29)
CREAT SERPL-MCNC: 0.75 MG/DL (ref 0.57–1)
DEPRECATED RDW RBC AUTO: 43.5 FL (ref 37–54)
EGFRCR SERPLBLD CKD-EPI 2021: 89.6 ML/MIN/1.73
EOSINOPHIL # BLD AUTO: 0.41 10*3/MM3 (ref 0–0.4)
EOSINOPHIL NFR BLD AUTO: 7.3 % (ref 0.3–6.2)
ERYTHROCYTE [DISTWIDTH] IN BLOOD BY AUTOMATED COUNT: 13.9 % (ref 12.3–15.4)
GLOBULIN UR ELPH-MCNC: 2.7 GM/DL
GLUCOSE SERPL-MCNC: 113 MG/DL (ref 65–99)
HCT VFR BLD AUTO: 44.5 % (ref 34–46.6)
HGB BLD-MCNC: 14 G/DL (ref 12–15.9)
IMM GRANULOCYTES # BLD AUTO: 0.01 10*3/MM3 (ref 0–0.05)
IMM GRANULOCYTES NFR BLD AUTO: 0.2 % (ref 0–0.5)
LYMPHOCYTES # BLD AUTO: 1.65 10*3/MM3 (ref 0.7–3.1)
LYMPHOCYTES NFR BLD AUTO: 29.5 % (ref 19.6–45.3)
MAGNESIUM SERPL-MCNC: 2.2 MG/DL (ref 1.6–2.4)
MCH RBC QN AUTO: 27 PG (ref 26.6–33)
MCHC RBC AUTO-ENTMCNC: 31.5 G/DL (ref 31.5–35.7)
MCV RBC AUTO: 85.7 FL (ref 79–97)
MONOCYTES # BLD AUTO: 0.48 10*3/MM3 (ref 0.1–0.9)
MONOCYTES NFR BLD AUTO: 8.6 % (ref 5–12)
NEUTROPHILS NFR BLD AUTO: 2.99 10*3/MM3 (ref 1.7–7)
NEUTROPHILS NFR BLD AUTO: 53.3 % (ref 42.7–76)
NRBC BLD AUTO-RTO: 0 /100 WBC (ref 0–0.2)
PLATELET # BLD AUTO: 210 10*3/MM3 (ref 140–450)
PMV BLD AUTO: 10.4 FL (ref 6–12)
POTASSIUM SERPL-SCNC: 4.7 MMOL/L (ref 3.5–5.2)
PROT SERPL-MCNC: 6.9 G/DL (ref 6–8.5)
RBC # BLD AUTO: 5.19 10*6/MM3 (ref 3.77–5.28)
SODIUM SERPL-SCNC: 139 MMOL/L (ref 136–145)
WBC NRBC COR # BLD: 5.6 10*3/MM3 (ref 3.4–10.8)

## 2022-05-16 PROCEDURE — 83735 ASSAY OF MAGNESIUM: CPT

## 2022-05-16 PROCEDURE — 25010000002 DEXAMETHASONE SODIUM PHOSPHATE 100 MG/10ML SOLUTION: Performed by: INTERNAL MEDICINE

## 2022-05-16 PROCEDURE — 36415 COLL VENOUS BLD VENIPUNCTURE: CPT

## 2022-05-16 PROCEDURE — 96409 CHEMO IV PUSH SNGL DRUG: CPT

## 2022-05-16 PROCEDURE — 25010000002 DIPHENHYDRAMINE PER 50 MG: Performed by: INTERNAL MEDICINE

## 2022-05-16 PROCEDURE — 96375 TX/PRO/DX INJ NEW DRUG ADDON: CPT

## 2022-05-16 PROCEDURE — 80053 COMPREHEN METABOLIC PANEL: CPT

## 2022-05-16 PROCEDURE — 25010000002 FOSAPREPITANT PER 1 MG: Performed by: INTERNAL MEDICINE

## 2022-05-16 PROCEDURE — 25010000002 PACLITAXEL PER 1 MG: Performed by: INTERNAL MEDICINE

## 2022-05-16 PROCEDURE — 25010000002 PALONOSETRON PER 25 MCG: Performed by: INTERNAL MEDICINE

## 2022-05-16 PROCEDURE — 96367 TX/PROPH/DG ADDL SEQ IV INF: CPT

## 2022-05-16 PROCEDURE — 25010000002 HEPARIN LOCK FLUSH PER 10 UNITS: Performed by: OBSTETRICS & GYNECOLOGY

## 2022-05-16 PROCEDURE — 85025 COMPLETE CBC W/AUTO DIFF WBC: CPT

## 2022-05-16 RX ORDER — DIPHENHYDRAMINE HCL 25 MG
25 CAPSULE ORAL EVERY 6 HOURS PRN
COMMUNITY
End: 2023-02-07

## 2022-05-16 RX ORDER — FAMOTIDINE 10 MG/ML
20 INJECTION, SOLUTION INTRAVENOUS ONCE
Status: DISCONTINUED | OUTPATIENT
Start: 2022-05-16 | End: 2022-05-16 | Stop reason: SDUPTHER

## 2022-05-16 RX ORDER — HEPARIN SODIUM (PORCINE) LOCK FLUSH IV SOLN 100 UNIT/ML 100 UNIT/ML
500 SOLUTION INTRAVENOUS AS NEEDED
Status: DISCONTINUED | OUTPATIENT
Start: 2022-05-16 | End: 2022-05-16 | Stop reason: HOSPADM

## 2022-05-16 RX ORDER — SODIUM CHLORIDE 9 MG/ML
250 INJECTION, SOLUTION INTRAVENOUS ONCE
Status: COMPLETED | OUTPATIENT
Start: 2022-05-16 | End: 2022-05-16

## 2022-05-16 RX ORDER — FAMOTIDINE 10 MG/ML
20 INJECTION, SOLUTION INTRAVENOUS AS NEEDED
Status: DISCONTINUED | OUTPATIENT
Start: 2022-05-16 | End: 2022-05-16 | Stop reason: HOSPADM

## 2022-05-16 RX ORDER — DIPHENHYDRAMINE HYDROCHLORIDE 50 MG/ML
50 INJECTION INTRAMUSCULAR; INTRAVENOUS AS NEEDED
Status: DISCONTINUED | OUTPATIENT
Start: 2022-05-16 | End: 2022-05-16 | Stop reason: HOSPADM

## 2022-05-16 RX ORDER — SODIUM CHLORIDE 0.9 % (FLUSH) 0.9 %
10 SYRINGE (ML) INJECTION AS NEEDED
Status: CANCELLED | OUTPATIENT
Start: 2022-05-16

## 2022-05-16 RX ORDER — SODIUM CHLORIDE 0.9 % (FLUSH) 0.9 %
10 SYRINGE (ML) INJECTION AS NEEDED
Status: DISCONTINUED | OUTPATIENT
Start: 2022-05-16 | End: 2022-05-16 | Stop reason: HOSPADM

## 2022-05-16 RX ORDER — PALONOSETRON 0.05 MG/ML
0.25 INJECTION, SOLUTION INTRAVENOUS ONCE
Status: COMPLETED | OUTPATIENT
Start: 2022-05-16 | End: 2022-05-16

## 2022-05-16 RX ORDER — DIPHENHYDRAMINE HYDROCHLORIDE 50 MG/ML
25 INJECTION INTRAMUSCULAR; INTRAVENOUS ONCE
Status: COMPLETED | OUTPATIENT
Start: 2022-05-16 | End: 2022-05-16

## 2022-05-16 RX ORDER — FAMOTIDINE 10 MG/ML
20 INJECTION, SOLUTION INTRAVENOUS ONCE
Status: COMPLETED | OUTPATIENT
Start: 2022-05-16 | End: 2022-05-16

## 2022-05-16 RX ORDER — DEXAMETHASONE 4 MG/1
TABLET ORAL
Qty: 40 TABLET | Refills: 0 | Status: SHIPPED | OUTPATIENT
Start: 2022-05-16 | End: 2022-10-06 | Stop reason: SDUPTHER

## 2022-05-16 RX ORDER — HEPARIN SODIUM (PORCINE) LOCK FLUSH IV SOLN 100 UNIT/ML 100 UNIT/ML
500 SOLUTION INTRAVENOUS AS NEEDED
Status: CANCELLED | OUTPATIENT
Start: 2022-05-16

## 2022-05-16 RX ADMIN — FOSAPREPITANT 150 MG: 150 INJECTION, POWDER, LYOPHILIZED, FOR SOLUTION INTRAVENOUS at 09:48

## 2022-05-16 RX ADMIN — Medication 500 UNITS: at 11:19

## 2022-05-16 RX ADMIN — DIPHENHYDRAMINE HYDROCHLORIDE 25 MG: 50 INJECTION, SOLUTION INTRAMUSCULAR; INTRAVENOUS at 09:26

## 2022-05-16 RX ADMIN — Medication 10 ML: at 11:19

## 2022-05-16 RX ADMIN — PALONOSETRON HYDROCHLORIDE 0.25 MG: 0.25 INJECTION INTRAVENOUS at 09:26

## 2022-05-16 RX ADMIN — SODIUM CHLORIDE 250 ML: 9 INJECTION, SOLUTION INTRAVENOUS at 09:26

## 2022-05-16 RX ADMIN — PACLITAXEL 365 MG: 6 INJECTION, SOLUTION INTRAVENOUS at 10:22

## 2022-05-16 RX ADMIN — FAMOTIDINE 20 MG: 10 INJECTION INTRAVENOUS at 09:26

## 2022-05-16 RX ADMIN — DEXAMETHASONE SODIUM PHOSPHATE 12 MG: 10 INJECTION, SOLUTION INTRAMUSCULAR; INTRAVENOUS at 09:27

## 2022-05-16 NOTE — PROGRESS NOTES
Patient's  came out of room and asked for me to come see patient. Patient c/o chest tightness/discomfort. Cycle 1 of Taxol stopped, normal saline rate increased, Caitie retrieved rescue drugs and vital signs obtained. Patient stated tightness was gone after stopping Taxol and vitals stable, notified [facility]  who said she would talk to Dr Lima and call me back. Patient stable at this time and rates pain/discomfort a 0.   Received callback from [facility] that Dr Lima wants to hold treatment today and he will call in oral decadron for her to take tomorrow and Wednesday and rechallenge on Thursday with benadryl and decadron dose increased. No labs needed on Thursday per [facility].

## 2022-05-17 ENCOUNTER — APPOINTMENT (OUTPATIENT)
Dept: ONCOLOGY | Facility: HOSPITAL | Age: 62
End: 2022-05-17

## 2022-05-17 ENCOUNTER — HOSPITAL ENCOUNTER (OUTPATIENT)
Dept: ULTRASOUND IMAGING | Facility: HOSPITAL | Age: 62
Discharge: HOME | End: 2022-05-17
Admitting: RADIOLOGY

## 2022-05-17 DIAGNOSIS — C54.1 ENDOMETRIAL CARCINOMA: ICD-10-CM

## 2022-05-17 DIAGNOSIS — Z90.722 S/P TAH-BSO (TOTAL ABDOMINAL HYSTERECTOMY AND BILATERAL SALPINGO-OOPHORECTOMY): ICD-10-CM

## 2022-05-17 DIAGNOSIS — Z90.710 S/P TAH-BSO (TOTAL ABDOMINAL HYSTERECTOMY AND BILATERAL SALPINGO-OOPHORECTOMY): ICD-10-CM

## 2022-05-17 DIAGNOSIS — Z90.79 S/P TAH-BSO (TOTAL ABDOMINAL HYSTERECTOMY AND BILATERAL SALPINGO-OOPHORECTOMY): ICD-10-CM

## 2022-05-17 PROCEDURE — 76857 US EXAM PELVIC LIMITED: CPT

## 2022-05-19 ENCOUNTER — INFUSION (OUTPATIENT)
Dept: ONCOLOGY | Facility: HOSPITAL | Age: 62
End: 2022-05-19

## 2022-05-19 VITALS
RESPIRATION RATE: 16 BRPM | HEIGHT: 65 IN | HEART RATE: 42 BPM | WEIGHT: 220 LBS | TEMPERATURE: 97.3 F | SYSTOLIC BLOOD PRESSURE: 152 MMHG | OXYGEN SATURATION: 96 % | DIASTOLIC BLOOD PRESSURE: 70 MMHG | BODY MASS INDEX: 36.65 KG/M2

## 2022-05-19 DIAGNOSIS — C54.1 ENDOMETRIAL CARCINOMA: Primary | ICD-10-CM

## 2022-05-19 DIAGNOSIS — Z45.2 FITTING AND ADJUSTMENT OF VASCULAR CATHETER: ICD-10-CM

## 2022-05-19 PROCEDURE — 25010000002 DIPHENHYDRAMINE PER 50 MG: Performed by: INTERNAL MEDICINE

## 2022-05-19 PROCEDURE — 25010000002 HEPARIN LOCK FLUSH PER 10 UNITS: Performed by: OBSTETRICS & GYNECOLOGY

## 2022-05-19 PROCEDURE — 96413 CHEMO IV INFUSION 1 HR: CPT

## 2022-05-19 PROCEDURE — 25010000002 PACLITAXEL PER 1 MG: Performed by: INTERNAL MEDICINE

## 2022-05-19 PROCEDURE — 96415 CHEMO IV INFUSION ADDL HR: CPT

## 2022-05-19 PROCEDURE — 25010000002 CARBOPLATIN PER 50 MG: Performed by: INTERNAL MEDICINE

## 2022-05-19 PROCEDURE — 96417 CHEMO IV INFUS EACH ADDL SEQ: CPT

## 2022-05-19 PROCEDURE — 96375 TX/PRO/DX INJ NEW DRUG ADDON: CPT

## 2022-05-19 PROCEDURE — 25010000002 DEXAMETHASONE SODIUM PHOSPHATE 100 MG/10ML SOLUTION: Performed by: INTERNAL MEDICINE

## 2022-05-19 PROCEDURE — 96367 TX/PROPH/DG ADDL SEQ IV INF: CPT

## 2022-05-19 PROCEDURE — 25010000002 PALONOSETRON PER 25 MCG: Performed by: INTERNAL MEDICINE

## 2022-05-19 PROCEDURE — 25010000002 FOSAPREPITANT PER 1 MG: Performed by: INTERNAL MEDICINE

## 2022-05-19 RX ORDER — SODIUM CHLORIDE 0.9 % (FLUSH) 0.9 %
10 SYRINGE (ML) INJECTION AS NEEDED
Status: DISCONTINUED | OUTPATIENT
Start: 2022-05-19 | End: 2022-05-19 | Stop reason: HOSPADM

## 2022-05-19 RX ORDER — DIPHENHYDRAMINE HYDROCHLORIDE 50 MG/ML
50 INJECTION INTRAMUSCULAR; INTRAVENOUS ONCE
Status: DISCONTINUED | OUTPATIENT
Start: 2022-05-19 | End: 2022-05-19

## 2022-05-19 RX ORDER — HEPARIN SODIUM (PORCINE) LOCK FLUSH IV SOLN 100 UNIT/ML 100 UNIT/ML
500 SOLUTION INTRAVENOUS AS NEEDED
Status: DISCONTINUED | OUTPATIENT
Start: 2022-05-19 | End: 2022-05-19 | Stop reason: HOSPADM

## 2022-05-19 RX ORDER — FAMOTIDINE 10 MG/ML
20 INJECTION, SOLUTION INTRAVENOUS AS NEEDED
Status: DISCONTINUED | OUTPATIENT
Start: 2022-05-19 | End: 2022-05-19 | Stop reason: HOSPADM

## 2022-05-19 RX ORDER — SODIUM CHLORIDE 9 MG/ML
250 INJECTION, SOLUTION INTRAVENOUS ONCE
Status: COMPLETED | OUTPATIENT
Start: 2022-05-19 | End: 2022-05-19

## 2022-05-19 RX ORDER — PALONOSETRON 0.05 MG/ML
0.25 INJECTION, SOLUTION INTRAVENOUS ONCE
Status: COMPLETED | OUTPATIENT
Start: 2022-05-19 | End: 2022-05-19

## 2022-05-19 RX ORDER — FAMOTIDINE 10 MG/ML
20 INJECTION, SOLUTION INTRAVENOUS ONCE
Status: COMPLETED | OUTPATIENT
Start: 2022-05-19 | End: 2022-05-19

## 2022-05-19 RX ORDER — HEPARIN SODIUM (PORCINE) LOCK FLUSH IV SOLN 100 UNIT/ML 100 UNIT/ML
500 SOLUTION INTRAVENOUS AS NEEDED
Status: CANCELLED | OUTPATIENT
Start: 2022-05-19

## 2022-05-19 RX ORDER — DIPHENHYDRAMINE HYDROCHLORIDE 50 MG/ML
50 INJECTION INTRAMUSCULAR; INTRAVENOUS AS NEEDED
Status: DISCONTINUED | OUTPATIENT
Start: 2022-05-19 | End: 2022-05-19 | Stop reason: HOSPADM

## 2022-05-19 RX ORDER — SODIUM CHLORIDE 0.9 % (FLUSH) 0.9 %
10 SYRINGE (ML) INJECTION AS NEEDED
Status: CANCELLED | OUTPATIENT
Start: 2022-05-19

## 2022-05-19 RX ADMIN — FAMOTIDINE 20 MG: 10 INJECTION INTRAVENOUS at 08:37

## 2022-05-19 RX ADMIN — DIPHENHYDRAMINE HYDROCHLORIDE 50 MG: 50 INJECTION, SOLUTION INTRAMUSCULAR; INTRAVENOUS at 08:57

## 2022-05-19 RX ADMIN — SODIUM CHLORIDE 250 ML: 9 INJECTION, SOLUTION INTRAVENOUS at 08:38

## 2022-05-19 RX ADMIN — PALONOSETRON HYDROCHLORIDE 0.25 MG: 0.25 INJECTION INTRAVENOUS at 08:34

## 2022-05-19 RX ADMIN — PACLITAXEL 365 MG: 6 INJECTION, SOLUTION INTRAVENOUS at 09:47

## 2022-05-19 RX ADMIN — CARBOPLATIN 600 MG: 10 INJECTION, SOLUTION INTRAVENOUS at 13:02

## 2022-05-19 RX ADMIN — Medication 10 ML: at 13:52

## 2022-05-19 RX ADMIN — FOSAPREPITANT 150 MG: 150 INJECTION, POWDER, LYOPHILIZED, FOR SOLUTION INTRAVENOUS at 09:14

## 2022-05-19 RX ADMIN — HEPARIN SODIUM (PORCINE) LOCK FLUSH IV SOLN 100 UNIT/ML 500 UNITS: 100 SOLUTION at 13:51

## 2022-05-19 RX ADMIN — DEXAMETHASONE SODIUM PHOSPHATE 20 MG: 10 INJECTION, SOLUTION INTRAMUSCULAR; INTRAVENOUS at 08:40

## 2022-05-20 ENCOUNTER — INFUSION (OUTPATIENT)
Dept: ONCOLOGY | Facility: HOSPITAL | Age: 62
End: 2022-05-20

## 2022-05-20 VITALS
RESPIRATION RATE: 18 BRPM | WEIGHT: 219.8 LBS | HEART RATE: 42 BPM | TEMPERATURE: 98.2 F | BODY MASS INDEX: 36.62 KG/M2 | SYSTOLIC BLOOD PRESSURE: 132 MMHG | HEIGHT: 65 IN | OXYGEN SATURATION: 97 % | DIASTOLIC BLOOD PRESSURE: 56 MMHG

## 2022-05-20 DIAGNOSIS — C54.1 ENDOMETRIAL CARCINOMA: Primary | ICD-10-CM

## 2022-05-20 PROCEDURE — 96372 THER/PROPH/DIAG INJ SC/IM: CPT

## 2022-05-20 PROCEDURE — 25010000002 PEGFILGRASTIM 6 MG/0.6ML SOLUTION PREFILLED SYRINGE: Performed by: INTERNAL MEDICINE

## 2022-05-20 RX ADMIN — PEGFILGRASTIM 6 MG: 6 INJECTION SUBCUTANEOUS at 14:00

## 2022-05-23 ENCOUNTER — APPOINTMENT (OUTPATIENT)
Dept: ONCOLOGY | Facility: HOSPITAL | Age: 62
End: 2022-05-23

## 2022-05-23 ENCOUNTER — APPOINTMENT (OUTPATIENT)
Dept: LAB | Facility: HOSPITAL | Age: 62
End: 2022-05-23

## 2022-05-24 ENCOUNTER — INFUSION (OUTPATIENT)
Dept: ONCOLOGY | Facility: HOSPITAL | Age: 62
End: 2022-05-24

## 2022-05-24 ENCOUNTER — TELEPHONE (OUTPATIENT)
Dept: ONCOLOGY | Facility: CLINIC | Age: 62
End: 2022-05-24

## 2022-05-24 VITALS
DIASTOLIC BLOOD PRESSURE: 61 MMHG | HEART RATE: 56 BPM | RESPIRATION RATE: 18 BRPM | BODY MASS INDEX: 35.65 KG/M2 | SYSTOLIC BLOOD PRESSURE: 143 MMHG | WEIGHT: 214 LBS | OXYGEN SATURATION: 98 % | TEMPERATURE: 97 F | HEIGHT: 65 IN

## 2022-05-24 DIAGNOSIS — R11.0 NAUSEA: ICD-10-CM

## 2022-05-24 DIAGNOSIS — Z45.2 FITTING AND ADJUSTMENT OF VASCULAR CATHETER: ICD-10-CM

## 2022-05-24 DIAGNOSIS — C55 MALIGNANT NEOPLASM OF UTERUS, UNSPECIFIED SITE: Primary | ICD-10-CM

## 2022-05-24 DIAGNOSIS — R19.7 DIARRHEA, UNSPECIFIED TYPE: Primary | ICD-10-CM

## 2022-05-24 PROCEDURE — 96361 HYDRATE IV INFUSION ADD-ON: CPT

## 2022-05-24 PROCEDURE — 96366 THER/PROPH/DIAG IV INF ADDON: CPT

## 2022-05-24 PROCEDURE — 96365 THER/PROPH/DIAG IV INF INIT: CPT

## 2022-05-24 PROCEDURE — 25010000002 HEPARIN LOCK FLUSH PER 10 UNITS: Performed by: OBSTETRICS & GYNECOLOGY

## 2022-05-24 PROCEDURE — 96360 HYDRATION IV INFUSION INIT: CPT

## 2022-05-24 PROCEDURE — 25010000002 ONDANSETRON PER 1 MG: Performed by: INTERNAL MEDICINE

## 2022-05-24 RX ORDER — HEPARIN SODIUM (PORCINE) LOCK FLUSH IV SOLN 100 UNIT/ML 100 UNIT/ML
500 SOLUTION INTRAVENOUS AS NEEDED
Status: CANCELLED | OUTPATIENT
Start: 2022-05-24

## 2022-05-24 RX ORDER — SODIUM CHLORIDE 0.9 % (FLUSH) 0.9 %
10 SYRINGE (ML) INJECTION AS NEEDED
Status: CANCELLED | OUTPATIENT
Start: 2022-05-24

## 2022-05-24 RX ORDER — ONDANSETRON HCL IN 0.9 % NACL 8 MG/50 ML
8 INTRAVENOUS SOLUTION, PIGGYBACK (ML) INTRAVENOUS ONCE
Status: COMPLETED | OUTPATIENT
Start: 2022-05-24 | End: 2022-05-24

## 2022-05-24 RX ORDER — SODIUM CHLORIDE 0.9 % (FLUSH) 0.9 %
10 SYRINGE (ML) INJECTION AS NEEDED
Status: DISCONTINUED | OUTPATIENT
Start: 2022-05-24 | End: 2022-05-24 | Stop reason: HOSPADM

## 2022-05-24 RX ORDER — DIPHENOXYLATE HYDROCHLORIDE AND ATROPINE SULFATE 2.5; .025 MG/1; MG/1
TABLET ORAL
Qty: 60 TABLET | Refills: 1 | Status: SHIPPED | OUTPATIENT
Start: 2022-05-24 | End: 2023-02-07

## 2022-05-24 RX ORDER — HEPARIN SODIUM (PORCINE) LOCK FLUSH IV SOLN 100 UNIT/ML 100 UNIT/ML
500 SOLUTION INTRAVENOUS AS NEEDED
Status: DISCONTINUED | OUTPATIENT
Start: 2022-05-24 | End: 2022-05-24 | Stop reason: HOSPADM

## 2022-05-24 RX ORDER — FAMOTIDINE 40 MG/1
TABLET, FILM COATED ORAL
Qty: 30 TABLET | Refills: 2 | Status: SHIPPED | OUTPATIENT
Start: 2022-05-24 | End: 2022-08-28 | Stop reason: SDUPTHER

## 2022-05-24 RX ADMIN — Medication 500 UNITS: at 14:57

## 2022-05-24 RX ADMIN — Medication 10 ML: at 14:57

## 2022-05-24 RX ADMIN — ONDANSETRON 8 MG: 2 INJECTION INTRAMUSCULAR; INTRAVENOUS at 13:13

## 2022-05-24 RX ADMIN — SODIUM CHLORIDE 1000 ML: 9 INJECTION, SOLUTION INTRAVENOUS at 13:13

## 2022-05-24 NOTE — TELEPHONE ENCOUNTER
"Received call from patient Rachel Sierra, she calls to report that she received a treatment last Thursday 5/19/22, since that time she has had diarrhea daily and has been using OTC Imodium with no results. She reports that she is just tired and weak and also her stomach is hurting, she reports that \"Im not sick to my stomach, it just hurts\", also her hemorrhoids have been bleeding and hurting from all the diarrhea episodes and she questions what can be done to help her?  "

## 2022-05-24 NOTE — TELEPHONE ENCOUNTER
Relayed all information to Dr Lima, he recommends:  1. [place] 2 days of IV fluids starting today 5/24/22 and tomorrow 5/25/22  1 Liter NS x 2 hours with 8 mg Zofran IV.  2. Prescribe Lomotil take as directed, and Pepcid, patient to  and take as prescribed.   [place]pt [place] for todays fluids @ 1 pm  [place] [place] for 5/25/22 @ 11 am

## 2022-05-25 ENCOUNTER — INFUSION (OUTPATIENT)
Dept: ONCOLOGY | Facility: HOSPITAL | Age: 62
End: 2022-05-25

## 2022-05-25 VITALS
OXYGEN SATURATION: 99 % | DIASTOLIC BLOOD PRESSURE: 54 MMHG | HEIGHT: 65 IN | RESPIRATION RATE: 14 BRPM | SYSTOLIC BLOOD PRESSURE: 132 MMHG | TEMPERATURE: 97.1 F | BODY MASS INDEX: 35.65 KG/M2 | HEART RATE: 53 BPM | WEIGHT: 214 LBS

## 2022-05-25 DIAGNOSIS — R11.0 NAUSEA: ICD-10-CM

## 2022-05-25 DIAGNOSIS — Z45.2 FITTING AND ADJUSTMENT OF VASCULAR CATHETER: ICD-10-CM

## 2022-05-25 DIAGNOSIS — R19.7 DIARRHEA, UNSPECIFIED TYPE: Primary | ICD-10-CM

## 2022-05-25 PROCEDURE — 96361 HYDRATE IV INFUSION ADD-ON: CPT

## 2022-05-25 PROCEDURE — 25010000002 ONDANSETRON PER 1 MG: Performed by: INTERNAL MEDICINE

## 2022-05-25 PROCEDURE — 96365 THER/PROPH/DIAG IV INF INIT: CPT

## 2022-05-25 PROCEDURE — 25010000002 HEPARIN LOCK FLUSH PER 10 UNITS: Performed by: OBSTETRICS & GYNECOLOGY

## 2022-05-25 RX ORDER — SODIUM CHLORIDE 0.9 % (FLUSH) 0.9 %
10 SYRINGE (ML) INJECTION AS NEEDED
Status: CANCELLED | OUTPATIENT
Start: 2022-05-25

## 2022-05-25 RX ORDER — HEPARIN SODIUM (PORCINE) LOCK FLUSH IV SOLN 100 UNIT/ML 100 UNIT/ML
500 SOLUTION INTRAVENOUS AS NEEDED
Status: DISCONTINUED | OUTPATIENT
Start: 2022-05-25 | End: 2022-05-25 | Stop reason: HOSPADM

## 2022-05-25 RX ORDER — SODIUM CHLORIDE 0.9 % (FLUSH) 0.9 %
10 SYRINGE (ML) INJECTION AS NEEDED
Status: DISCONTINUED | OUTPATIENT
Start: 2022-05-25 | End: 2022-05-25 | Stop reason: HOSPADM

## 2022-05-25 RX ORDER — HEPARIN SODIUM (PORCINE) LOCK FLUSH IV SOLN 100 UNIT/ML 100 UNIT/ML
500 SOLUTION INTRAVENOUS AS NEEDED
Status: CANCELLED | OUTPATIENT
Start: 2022-05-25

## 2022-05-25 RX ORDER — ONDANSETRON HCL IN 0.9 % NACL 8 MG/50 ML
8 INTRAVENOUS SOLUTION, PIGGYBACK (ML) INTRAVENOUS ONCE
Status: COMPLETED | OUTPATIENT
Start: 2022-05-25 | End: 2022-05-25

## 2022-05-25 RX ADMIN — Medication 500 UNITS: at 13:02

## 2022-05-25 RX ADMIN — SODIUM CHLORIDE 1000 ML: 9 INJECTION, SOLUTION INTRAVENOUS at 11:09

## 2022-05-25 RX ADMIN — Medication 10 ML: at 13:02

## 2022-05-25 RX ADMIN — ONDANSETRON 8 MG: 2 INJECTION INTRAMUSCULAR; INTRAVENOUS at 11:11

## 2022-05-26 ENCOUNTER — INFUSION (OUTPATIENT)
Dept: ONCOLOGY | Facility: HOSPITAL | Age: 62
End: 2022-05-26

## 2022-05-26 ENCOUNTER — LAB (OUTPATIENT)
Dept: LAB | Facility: HOSPITAL | Age: 62
End: 2022-05-26

## 2022-05-26 DIAGNOSIS — C54.1 ENDOMETRIAL CARCINOMA: ICD-10-CM

## 2022-05-26 LAB
ALBUMIN SERPL-MCNC: 3.9 G/DL (ref 3.5–5.2)
ALBUMIN/GLOB SERPL: 1.8 G/DL
ALP SERPL-CCNC: 100 U/L (ref 39–117)
ALT SERPL W P-5'-P-CCNC: 90 U/L (ref 1–33)
ANION GAP SERPL CALCULATED.3IONS-SCNC: 8 MMOL/L (ref 5–15)
AST SERPL-CCNC: 51 U/L (ref 1–32)
BILIRUB SERPL-MCNC: 0.2 MG/DL (ref 0–1.2)
BUN SERPL-MCNC: 6 MG/DL (ref 8–23)
BUN/CREAT SERPL: 9.2 (ref 7–25)
CALCIUM SPEC-SCNC: 8.9 MG/DL (ref 8.6–10.5)
CHLORIDE SERPL-SCNC: 103 MMOL/L (ref 98–107)
CO2 SERPL-SCNC: 26 MMOL/L (ref 22–29)
CREAT SERPL-MCNC: 0.65 MG/DL (ref 0.57–1)
DEPRECATED RDW RBC AUTO: 46.2 FL (ref 37–54)
EGFRCR SERPLBLD CKD-EPI 2021: 99.1 ML/MIN/1.73
EOSINOPHIL # BLD MANUAL: 0.41 10*3/MM3 (ref 0–0.4)
EOSINOPHIL NFR BLD MANUAL: 3.2 % (ref 0.3–6.2)
ERYTHROCYTE [DISTWIDTH] IN BLOOD BY AUTOMATED COUNT: 14.6 % (ref 12.3–15.4)
GLOBULIN UR ELPH-MCNC: 2.2 GM/DL
GLUCOSE SERPL-MCNC: 100 MG/DL (ref 65–99)
HCT VFR BLD AUTO: 39.3 % (ref 34–46.6)
HGB BLD-MCNC: 12.7 G/DL (ref 12–15.9)
LYMPHOCYTES # BLD MANUAL: 3.09 10*3/MM3 (ref 0.7–3.1)
LYMPHOCYTES NFR BLD MANUAL: 13.7 % (ref 5–12)
MAGNESIUM SERPL-MCNC: 2.2 MG/DL (ref 1.6–2.4)
MCH RBC QN AUTO: 27.9 PG (ref 26.6–33)
MCHC RBC AUTO-ENTMCNC: 32.3 G/DL (ref 31.5–35.7)
MCV RBC AUTO: 86.2 FL (ref 79–97)
METAMYELOCYTES NFR BLD MANUAL: 1.1 % (ref 0–0)
MONOCYTES # BLD: 1.75 10*3/MM3 (ref 0.1–0.9)
MYELOCYTES NFR BLD MANUAL: 2.1 % (ref 0–0)
NEUTROPHILS # BLD AUTO: 7 10*3/MM3 (ref 1.7–7)
NEUTROPHILS NFR BLD MANUAL: 42.1 % (ref 42.7–76)
NEUTS BAND NFR BLD MANUAL: 12.6 % (ref 0–5)
NEUTS VAC BLD QL SMEAR: ABNORMAL
PLAT MORPH BLD: NORMAL
PLATELET # BLD AUTO: 168 10*3/MM3 (ref 140–450)
PMV BLD AUTO: 10.6 FL (ref 6–12)
POTASSIUM SERPL-SCNC: 4.1 MMOL/L (ref 3.5–5.2)
PROMYELOCYTES NFR BLD MANUAL: 1.1 % (ref 0–0)
PROT SERPL-MCNC: 6.1 G/DL (ref 6–8.5)
RBC # BLD AUTO: 4.56 10*6/MM3 (ref 3.77–5.28)
RBC MORPH BLD: NORMAL
SMUDGE CELLS BLD QL SMEAR: ABNORMAL
SODIUM SERPL-SCNC: 137 MMOL/L (ref 136–145)
TOXIC GRANULATION: ABNORMAL
VARIANT LYMPHS NFR BLD MANUAL: 17.9 % (ref 19.6–45.3)
VARIANT LYMPHS NFR BLD MANUAL: 6.3 % (ref 0–5)
WBC NRBC COR # BLD: 12.78 10*3/MM3 (ref 3.4–10.8)

## 2022-05-26 PROCEDURE — 80053 COMPREHEN METABOLIC PANEL: CPT

## 2022-05-26 PROCEDURE — 85025 COMPLETE CBC W/AUTO DIFF WBC: CPT

## 2022-05-26 PROCEDURE — 83735 ASSAY OF MAGNESIUM: CPT

## 2022-05-26 PROCEDURE — 85007 BL SMEAR W/DIFF WBC COUNT: CPT

## 2022-05-26 PROCEDURE — 36415 COLL VENOUS BLD VENIPUNCTURE: CPT

## 2022-05-27 ENCOUNTER — TELEPHONE (OUTPATIENT)
Dept: ONCOLOGY | Facility: CLINIC | Age: 62
End: 2022-05-27

## 2022-05-27 DIAGNOSIS — R74.8 ELEVATED LIVER ENZYMES: Primary | ICD-10-CM

## 2022-05-27 NOTE — TELEPHONE ENCOUNTER
----- Message from Js Lima MD sent at 5/26/2022  5:43 PM CDT -----  ALT 90 (prior 23), AST 51 (prior 22), with CT abdomen showing normal liver.  Possibly chemo associated?  Please draw hepatitis profile and repeat CMP in 1 week thank you

## 2022-05-31 ENCOUNTER — APPOINTMENT (OUTPATIENT)
Dept: ONCOLOGY | Facility: HOSPITAL | Age: 62
End: 2022-05-31

## 2022-05-31 ENCOUNTER — APPOINTMENT (OUTPATIENT)
Dept: LAB | Facility: HOSPITAL | Age: 62
End: 2022-05-31

## 2022-06-01 ENCOUNTER — HOSPITAL ENCOUNTER (OUTPATIENT)
Dept: RADIATION ONCOLOGY | Facility: HOSPITAL | Age: 62
Setting detail: RADIATION/ONCOLOGY SERIES
End: 2022-06-01

## 2022-06-02 ENCOUNTER — LAB (OUTPATIENT)
Dept: LAB | Facility: HOSPITAL | Age: 62
End: 2022-06-02

## 2022-06-02 ENCOUNTER — INFUSION (OUTPATIENT)
Dept: ONCOLOGY | Facility: HOSPITAL | Age: 62
End: 2022-06-02

## 2022-06-02 VITALS
WEIGHT: 214.2 LBS | OXYGEN SATURATION: 96 % | DIASTOLIC BLOOD PRESSURE: 56 MMHG | SYSTOLIC BLOOD PRESSURE: 125 MMHG | BODY MASS INDEX: 35.69 KG/M2 | HEART RATE: 57 BPM | HEIGHT: 65 IN | TEMPERATURE: 97.1 F | RESPIRATION RATE: 16 BRPM

## 2022-06-02 DIAGNOSIS — R74.8 ELEVATED LIVER ENZYMES: ICD-10-CM

## 2022-06-02 DIAGNOSIS — C54.1 ENDOMETRIAL CARCINOMA: ICD-10-CM

## 2022-06-02 LAB
ALBUMIN SERPL-MCNC: 4 G/DL (ref 3.5–5.2)
ALBUMIN/GLOB SERPL: 1.7 G/DL
ALP SERPL-CCNC: 94 U/L (ref 39–117)
ALT SERPL W P-5'-P-CCNC: 33 U/L (ref 1–33)
ANION GAP SERPL CALCULATED.3IONS-SCNC: 4 MMOL/L (ref 5–15)
AST SERPL-CCNC: 21 U/L (ref 1–32)
BASOPHILS # BLD AUTO: 0.04 10*3/MM3 (ref 0–0.2)
BASOPHILS NFR BLD AUTO: 0.6 % (ref 0–1.5)
BILIRUB SERPL-MCNC: 0.2 MG/DL (ref 0–1.2)
BUN SERPL-MCNC: 14 MG/DL (ref 8–23)
BUN/CREAT SERPL: 19.7 (ref 7–25)
CALCIUM SPEC-SCNC: 9.2 MG/DL (ref 8.6–10.5)
CHLORIDE SERPL-SCNC: 104 MMOL/L (ref 98–107)
CO2 SERPL-SCNC: 31 MMOL/L (ref 22–29)
CREAT SERPL-MCNC: 0.71 MG/DL (ref 0.57–1)
DEPRECATED RDW RBC AUTO: 45.4 FL (ref 37–54)
EGFRCR SERPLBLD CKD-EPI 2021: 95.7 ML/MIN/1.73
EOSINOPHIL # BLD AUTO: 0.11 10*3/MM3 (ref 0–0.4)
EOSINOPHIL NFR BLD AUTO: 1.7 % (ref 0.3–6.2)
ERYTHROCYTE [DISTWIDTH] IN BLOOD BY AUTOMATED COUNT: 15.2 % (ref 12.3–15.4)
GLOBULIN UR ELPH-MCNC: 2.4 GM/DL
GLUCOSE SERPL-MCNC: 105 MG/DL (ref 65–99)
HAV IGM SERPL QL IA: NORMAL
HBV CORE IGM SERPL QL IA: NORMAL
HBV SURFACE AG SERPL QL IA: NORMAL
HCT VFR BLD AUTO: 39.5 % (ref 34–46.6)
HCV AB SER DONR QL: NORMAL
HGB BLD-MCNC: 13 G/DL (ref 12–15.9)
LYMPHOCYTES # BLD AUTO: 1.57 10*3/MM3 (ref 0.7–3.1)
LYMPHOCYTES NFR BLD AUTO: 24.3 % (ref 19.6–45.3)
MAGNESIUM SERPL-MCNC: 2.4 MG/DL (ref 1.6–2.4)
MCH RBC QN AUTO: 28 PG (ref 26.6–33)
MCHC RBC AUTO-ENTMCNC: 32.9 G/DL (ref 31.5–35.7)
MCV RBC AUTO: 84.9 FL (ref 79–97)
MONOCYTES # BLD AUTO: 0.46 10*3/MM3 (ref 0.1–0.9)
MONOCYTES NFR BLD AUTO: 7.1 % (ref 5–12)
NEUTROPHILS NFR BLD AUTO: 4.16 10*3/MM3 (ref 1.7–7)
NEUTROPHILS NFR BLD AUTO: 64.6 % (ref 42.7–76)
PLATELET # BLD AUTO: 123 10*3/MM3 (ref 140–450)
PMV BLD AUTO: 9.9 FL (ref 6–12)
POTASSIUM SERPL-SCNC: 4.8 MMOL/L (ref 3.5–5.2)
PROT SERPL-MCNC: 6.4 G/DL (ref 6–8.5)
RBC # BLD AUTO: 4.65 10*6/MM3 (ref 3.77–5.28)
SODIUM SERPL-SCNC: 139 MMOL/L (ref 136–145)
WBC NRBC COR # BLD: 6.45 10*3/MM3 (ref 3.4–10.8)

## 2022-06-02 PROCEDURE — 36415 COLL VENOUS BLD VENIPUNCTURE: CPT

## 2022-06-02 PROCEDURE — 80074 ACUTE HEPATITIS PANEL: CPT

## 2022-06-02 PROCEDURE — 80053 COMPREHEN METABOLIC PANEL: CPT

## 2022-06-02 PROCEDURE — 85025 COMPLETE CBC W/AUTO DIFF WBC: CPT

## 2022-06-02 PROCEDURE — G0463 HOSPITAL OUTPT CLINIC VISIT: HCPCS

## 2022-06-02 PROCEDURE — 86304 IMMUNOASSAY TUMOR CA 125: CPT

## 2022-06-02 PROCEDURE — 83735 ASSAY OF MAGNESIUM: CPT

## 2022-06-03 LAB — CANCER AG125 SERPL QL: 13.1 U/ML (ref 0–38.1)

## 2022-06-06 ENCOUNTER — LAB (OUTPATIENT)
Dept: LAB | Facility: HOSPITAL | Age: 62
End: 2022-06-06

## 2022-06-06 ENCOUNTER — APPOINTMENT (OUTPATIENT)
Dept: ONCOLOGY | Facility: HOSPITAL | Age: 62
End: 2022-06-06

## 2022-06-06 DIAGNOSIS — C54.1 ENDOMETRIAL CARCINOMA: ICD-10-CM

## 2022-06-06 LAB
ALBUMIN SERPL-MCNC: 4.3 G/DL (ref 3.5–5.2)
ALBUMIN/GLOB SERPL: 1.7 G/DL
ALP SERPL-CCNC: 96 U/L (ref 39–117)
ALT SERPL W P-5'-P-CCNC: 26 U/L (ref 1–33)
ANION GAP SERPL CALCULATED.3IONS-SCNC: 6 MMOL/L (ref 5–15)
AST SERPL-CCNC: 20 U/L (ref 1–32)
BASOPHILS # BLD AUTO: 0.05 10*3/MM3 (ref 0–0.2)
BASOPHILS NFR BLD AUTO: 1 % (ref 0–1.5)
BILIRUB SERPL-MCNC: 0.4 MG/DL (ref 0–1.2)
BUN SERPL-MCNC: 10 MG/DL (ref 8–23)
BUN/CREAT SERPL: 18.2 (ref 7–25)
CALCIUM SPEC-SCNC: 9.4 MG/DL (ref 8.6–10.5)
CHLORIDE SERPL-SCNC: 106 MMOL/L (ref 98–107)
CO2 SERPL-SCNC: 26 MMOL/L (ref 22–29)
CREAT SERPL-MCNC: 0.55 MG/DL (ref 0.57–1)
DEPRECATED RDW RBC AUTO: 46.3 FL (ref 37–54)
EGFRCR SERPLBLD CKD-EPI 2021: 103.1 ML/MIN/1.73
EOSINOPHIL # BLD AUTO: 0.06 10*3/MM3 (ref 0–0.4)
EOSINOPHIL NFR BLD AUTO: 1.2 % (ref 0.3–6.2)
ERYTHROCYTE [DISTWIDTH] IN BLOOD BY AUTOMATED COUNT: 15.5 % (ref 12.3–15.4)
GLOBULIN UR ELPH-MCNC: 2.5 GM/DL
GLUCOSE SERPL-MCNC: 105 MG/DL (ref 65–99)
HCT VFR BLD AUTO: 41.9 % (ref 34–46.6)
HGB BLD-MCNC: 13.7 G/DL (ref 12–15.9)
IMM GRANULOCYTES # BLD AUTO: 0.05 10*3/MM3 (ref 0–0.05)
IMM GRANULOCYTES NFR BLD AUTO: 1 % (ref 0–0.5)
LYMPHOCYTES # BLD AUTO: 1.5 10*3/MM3 (ref 0.7–3.1)
LYMPHOCYTES NFR BLD AUTO: 30.1 % (ref 19.6–45.3)
MCH RBC QN AUTO: 27.9 PG (ref 26.6–33)
MCHC RBC AUTO-ENTMCNC: 32.7 G/DL (ref 31.5–35.7)
MCV RBC AUTO: 85.3 FL (ref 79–97)
MONOCYTES # BLD AUTO: 0.39 10*3/MM3 (ref 0.1–0.9)
MONOCYTES NFR BLD AUTO: 7.8 % (ref 5–12)
NEUTROPHILS NFR BLD AUTO: 2.94 10*3/MM3 (ref 1.7–7)
NEUTROPHILS NFR BLD AUTO: 58.9 % (ref 42.7–76)
NRBC BLD AUTO-RTO: 0 /100 WBC (ref 0–0.2)
PLATELET # BLD AUTO: 258 10*3/MM3 (ref 140–450)
PMV BLD AUTO: 10.4 FL (ref 6–12)
POTASSIUM SERPL-SCNC: 4.2 MMOL/L (ref 3.5–5.2)
PROT SERPL-MCNC: 6.8 G/DL (ref 6–8.5)
RBC # BLD AUTO: 4.91 10*6/MM3 (ref 3.77–5.28)
SODIUM SERPL-SCNC: 138 MMOL/L (ref 136–145)
WBC NRBC COR # BLD: 4.99 10*3/MM3 (ref 3.4–10.8)

## 2022-06-06 PROCEDURE — 36415 COLL VENOUS BLD VENIPUNCTURE: CPT

## 2022-06-06 PROCEDURE — 80053 COMPREHEN METABOLIC PANEL: CPT

## 2022-06-06 PROCEDURE — 85025 COMPLETE CBC W/AUTO DIFF WBC: CPT

## 2022-06-07 ENCOUNTER — APPOINTMENT (OUTPATIENT)
Dept: ONCOLOGY | Facility: HOSPITAL | Age: 62
End: 2022-06-07

## 2022-06-09 ENCOUNTER — INFUSION (OUTPATIENT)
Dept: ONCOLOGY | Facility: HOSPITAL | Age: 62
End: 2022-06-09

## 2022-06-09 VITALS
HEART RATE: 50 BPM | OXYGEN SATURATION: 99 % | DIASTOLIC BLOOD PRESSURE: 62 MMHG | TEMPERATURE: 97.3 F | WEIGHT: 212 LBS | HEIGHT: 65 IN | RESPIRATION RATE: 15 BRPM | BODY MASS INDEX: 35.32 KG/M2 | SYSTOLIC BLOOD PRESSURE: 112 MMHG

## 2022-06-09 DIAGNOSIS — Z45.2 FITTING AND ADJUSTMENT OF VASCULAR CATHETER: ICD-10-CM

## 2022-06-09 DIAGNOSIS — C54.1 ENDOMETRIAL CARCINOMA: Primary | ICD-10-CM

## 2022-06-09 PROCEDURE — 25010000002 FOSAPREPITANT PER 1 MG: Performed by: INTERNAL MEDICINE

## 2022-06-09 PROCEDURE — 96415 CHEMO IV INFUSION ADDL HR: CPT

## 2022-06-09 PROCEDURE — 25010000002 CARBOPLATIN PER 50 MG: Performed by: INTERNAL MEDICINE

## 2022-06-09 PROCEDURE — 25010000002 DEXAMETHASONE SODIUM PHOSPHATE 100 MG/10ML SOLUTION: Performed by: INTERNAL MEDICINE

## 2022-06-09 PROCEDURE — 96367 TX/PROPH/DG ADDL SEQ IV INF: CPT

## 2022-06-09 PROCEDURE — 96375 TX/PRO/DX INJ NEW DRUG ADDON: CPT

## 2022-06-09 PROCEDURE — 96417 CHEMO IV INFUS EACH ADDL SEQ: CPT

## 2022-06-09 PROCEDURE — 25010000002 DIPHENHYDRAMINE PER 50 MG: Performed by: INTERNAL MEDICINE

## 2022-06-09 PROCEDURE — 96413 CHEMO IV INFUSION 1 HR: CPT

## 2022-06-09 PROCEDURE — 25010000002 HEPARIN LOCK FLUSH PER 10 UNITS: Performed by: OBSTETRICS & GYNECOLOGY

## 2022-06-09 PROCEDURE — 25010000002 PALONOSETRON PER 25 MCG: Performed by: INTERNAL MEDICINE

## 2022-06-09 PROCEDURE — 25010000002 PACLITAXEL PER 1 MG: Performed by: INTERNAL MEDICINE

## 2022-06-09 RX ORDER — FAMOTIDINE 10 MG/ML
20 INJECTION, SOLUTION INTRAVENOUS ONCE
Status: COMPLETED | OUTPATIENT
Start: 2022-06-09 | End: 2022-06-09

## 2022-06-09 RX ORDER — HEPARIN SODIUM (PORCINE) LOCK FLUSH IV SOLN 100 UNIT/ML 100 UNIT/ML
500 SOLUTION INTRAVENOUS AS NEEDED
Status: DISCONTINUED | OUTPATIENT
Start: 2022-06-09 | End: 2022-06-09 | Stop reason: HOSPADM

## 2022-06-09 RX ORDER — SODIUM CHLORIDE 0.9 % (FLUSH) 0.9 %
10 SYRINGE (ML) INJECTION AS NEEDED
Status: CANCELLED | OUTPATIENT
Start: 2022-06-09

## 2022-06-09 RX ORDER — FAMOTIDINE 10 MG/ML
20 INJECTION, SOLUTION INTRAVENOUS AS NEEDED
Status: CANCELLED | OUTPATIENT
Start: 2022-06-09

## 2022-06-09 RX ORDER — SODIUM CHLORIDE 9 MG/ML
250 INJECTION, SOLUTION INTRAVENOUS ONCE
Status: CANCELLED | OUTPATIENT
Start: 2022-06-09

## 2022-06-09 RX ORDER — PALONOSETRON 0.05 MG/ML
0.25 INJECTION, SOLUTION INTRAVENOUS ONCE
Status: CANCELLED | OUTPATIENT
Start: 2022-06-09

## 2022-06-09 RX ORDER — SODIUM CHLORIDE 9 MG/ML
250 INJECTION, SOLUTION INTRAVENOUS ONCE
Status: COMPLETED | OUTPATIENT
Start: 2022-06-09 | End: 2022-06-09

## 2022-06-09 RX ORDER — HEPARIN SODIUM (PORCINE) LOCK FLUSH IV SOLN 100 UNIT/ML 100 UNIT/ML
500 SOLUTION INTRAVENOUS AS NEEDED
Status: CANCELLED | OUTPATIENT
Start: 2022-06-09

## 2022-06-09 RX ORDER — DIPHENHYDRAMINE HYDROCHLORIDE 50 MG/ML
50 INJECTION INTRAMUSCULAR; INTRAVENOUS ONCE
Status: COMPLETED | OUTPATIENT
Start: 2022-06-09 | End: 2022-06-09

## 2022-06-09 RX ORDER — PALONOSETRON 0.05 MG/ML
0.25 INJECTION, SOLUTION INTRAVENOUS ONCE
Status: COMPLETED | OUTPATIENT
Start: 2022-06-09 | End: 2022-06-09

## 2022-06-09 RX ORDER — SODIUM CHLORIDE 0.9 % (FLUSH) 0.9 %
10 SYRINGE (ML) INJECTION AS NEEDED
Status: DISCONTINUED | OUTPATIENT
Start: 2022-06-09 | End: 2022-06-09 | Stop reason: HOSPADM

## 2022-06-09 RX ORDER — DIPHENHYDRAMINE HYDROCHLORIDE 50 MG/ML
50 INJECTION INTRAMUSCULAR; INTRAVENOUS AS NEEDED
Status: DISCONTINUED | OUTPATIENT
Start: 2022-06-09 | End: 2022-06-09 | Stop reason: HOSPADM

## 2022-06-09 RX ORDER — FAMOTIDINE 10 MG/ML
20 INJECTION, SOLUTION INTRAVENOUS AS NEEDED
Status: DISCONTINUED | OUTPATIENT
Start: 2022-06-09 | End: 2022-06-09 | Stop reason: HOSPADM

## 2022-06-09 RX ORDER — DIPHENHYDRAMINE HYDROCHLORIDE 50 MG/ML
50 INJECTION INTRAMUSCULAR; INTRAVENOUS AS NEEDED
Status: CANCELLED | OUTPATIENT
Start: 2022-06-09

## 2022-06-09 RX ORDER — DIPHENHYDRAMINE HYDROCHLORIDE 50 MG/ML
50 INJECTION INTRAMUSCULAR; INTRAVENOUS ONCE
Status: CANCELLED
Start: 2022-06-09 | End: 2022-06-09

## 2022-06-09 RX ORDER — FAMOTIDINE 10 MG/ML
20 INJECTION, SOLUTION INTRAVENOUS ONCE
Status: CANCELLED
Start: 2022-06-09 | End: 2022-06-09

## 2022-06-09 RX ADMIN — DIPHENHYDRAMINE HYDROCHLORIDE 50 MG: 50 INJECTION, SOLUTION INTRAMUSCULAR; INTRAVENOUS at 09:03

## 2022-06-09 RX ADMIN — SODIUM CHLORIDE 250 ML: 9 INJECTION, SOLUTION INTRAVENOUS at 09:03

## 2022-06-09 RX ADMIN — FOSAPREPITANT 150 MG: 150 INJECTION, POWDER, LYOPHILIZED, FOR SOLUTION INTRAVENOUS at 09:30

## 2022-06-09 RX ADMIN — CARBOPLATIN 600 MG: 10 INJECTION, SOLUTION INTRAVENOUS at 13:01

## 2022-06-09 RX ADMIN — PALONOSETRON HYDROCHLORIDE 0.25 MG: 0.25 INJECTION INTRAVENOUS at 09:03

## 2022-06-09 RX ADMIN — FAMOTIDINE 20 MG: 10 INJECTION INTRAVENOUS at 09:03

## 2022-06-09 RX ADMIN — PACLITAXEL 365 MG: 6 INJECTION, SOLUTION INTRAVENOUS at 10:04

## 2022-06-09 RX ADMIN — Medication 10 ML: at 13:37

## 2022-06-09 RX ADMIN — DEXAMETHASONE SODIUM PHOSPHATE 20 MG: 10 INJECTION, SOLUTION INTRAMUSCULAR; INTRAVENOUS at 09:10

## 2022-06-09 RX ADMIN — Medication 500 UNITS: at 13:36

## 2022-06-09 NOTE — PROGRESS NOTES
[facility] HEMATOLOGY & ONCOLOGY  [address]  [facility] [address] [phone]    Patient Name: Rachel Sierra  Encounter Date: 06/15/2022  YOB: 1960  Patient Number: [id number]       REASON FOR VISIT: Rachel Sierra is a 61-year-old female who returns in follow-up of stage FIGO IA endometrial carcinoma.  She underwent robotic assisted total laparoscopic hysterectomy, bilateral salpingo-oophorectomy, sentinel lymph node mapping, 04/07/2022.  She is receiving adjuvant carboplatin+ paclitaxel, cycle 2 on 06/09/2022. She is seen to discuss the diagnostic information gathered since her initial visit and for subsequent management planning.  She is here with her spouse, Pablo.    I have reviewed the HPI and verified with the patient the accuracy of it. No changes to interval history since the information was documented. Js Lima MD 08/16/22     Diagnostic abnormalities:  --02/24/2022- transvaginal ultrasound--uterus 10 cm length, 3.9 cm fundal fibroid.  Endometrial thickness 27 mm, ovaries normal.  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  --03/07/2022- BRCA 1/2: Negative-no clinically significant variants identified  --03/17/2022-mammogram-persistent nodular densities within the right retroareolar and left medial breast corresponding with benign cysts versus ductal ectasia in the medial left breast.  Finding consistent with benign process.  Return to annual screening mammography recommended, March 2023.  BI-RADS 2 benign exam.  --04/06/2022- BMP normal.  CBC normal.  --04/27/2022 --consult by Dr. Miller.  Recommends treatment to the pelvis with curative radiation therapy, anticipate 4500 cGy over 25-28 fractions, final course pending completion of planning.  In addition recommend 3 HDR brachytherapy fractions, final course  "pending.  --05/05/2022-- CT chest-no acute abnormality no evidence of neoplastic disease within the chest.  -- 05/05/2022-CT abdomen/pelvis--3-4 cm soft tissue \"mass\" at the left pelvic sidewall for which neoplastic lymphadenopathy is favored given the history of endometrial carcinoma. Mildly dilated right ureter extending into the upper pelvis where there appears to be a focus of scarring. There is no high-grade ureteral obstruction. Addendum: This examination is reviewed with and discussed with Dr. Miller. The previously described 3-4 cm soft tissue mass along the left pelvic  sidewall is seen to have fluid density. The low-density and discrete margins support the idea that this represents a lymphocele rather than neoplastic lymphadenopathy. Ultrasound evaluation is planned to confirm this. If ultrasound is not helpful pre and postcontrast pelvic MRI should be  Considered.  -- 05/17/2022- ultrasound pelvis-Reidentified LEFT iliac chain collection/lesion measuring 3.0 x 3.7 x 2.7 cm. There are some low-level internal echoes within this lesion. No definite internal vascularity. Favor this to represent a postoperative lymphocele, especially given recent niecy dissection in this region. However this area is incompletely characterized by ultrasound and continued follow-up is recommended to confirm stability or resolution. Alternatively, MRI pelvis would provide better characterization.    Previous interventions:  --04/07/2022- robotic assisted total laparoscopic hysterectomy, bilateral salpingo-oophorectomy, sentinel lymph node mapping by Dr. Jay.  --Final diagnosis:1. Uterus, Cervix, Bilateral Fallopian Tubes and Ovaries, Hysterectomy with Bilateral Salpingo-oophorectomy         (236 grams): High-grade endometrial carcinoma.               A. Endometrium:                           1. High-grade serous carcinoma.                          2. The tumor measures 4.2 x 3.5 x 1.5 cm.                          3. The " "tumor extends into the myometrium a total of 6.25 mm out of a total thickness of 13 mm       (48%)  4. No definitive capillary lymphatic invasion is identified.   5. No definitive perineural invasion is seen.  6. Adenomyosis is identified with no definitive tumor involving the adenomyosis.  7. Parametrium not involved.              8. The tumor extends into the lower uterine segment but does not invade the stroma.  B. Benign cervix with               1. Nabothian cysts.  C. Benign parametria.  D. Benign right fallopian tube.  E. Benign right ovary.  F. Benign left fallopian tube.  G. Benign ovary.              H. Benign serosa.   2. Lymph Nodes, Right Obturator [place] Lymph Node, Excision:                A. Three benign lymph nodes.   3. Lymph Nodes, Left Obturator [place] Lymph Node, Excision:               A. Three benign lymph nodes.     -- Adjuvant carboplatin+ paclitaxel:  C1, 05/19/2022; C2, 06/09/2022  -- Anticipate \"sandwich\" treatment to the pelvis with curative radiation therapy, anticipate 4500 cGy over 25-28 fractions, final course pending completion of planning.  In addition recommend 3 HDR brachytherapy fractions, final course pending.      LABS    Lab Results - Last 18 Months   Lab Units 08/10/22  0754 07/07/22  0827 06/30/22  0733 06/23/22  1418 06/16/22  1423 06/06/22  0846 06/02/22  1424 05/26/22  1407 05/16/22  0820 05/05/22  1337 04/27/22  1006   HEMOGLOBIN g/dL 13.6 12.2 12.8 12.7 13.1 13.7 13.0 12.7 14.0 14.0 14.5   HEMATOCRIT % 41.7 37.4 38.7 38.2 40.5 41.9 39.5 39.3 44.5 43.8 43.8   MCV fL 90.8 89.0 86.8 86.4 88.0 85.3 84.9 86.2 85.7 86.1 84.6   WBC 10*3/mm3 4.00 6.13 8.66 8.38 13.26* 4.99 6.45 12.78* 5.60 5.66 7.37   RDW % 15.8* 17.6* 18.1* 16.9* 16.3* 15.5* 15.2 14.6 13.9 13.5 13.2   MPV fL 9.3 10.4 9.4 10.1 10.8 10.4 9.9 10.6 10.4 9.9 9.3   PLATELETS 10*3/mm3 177 159 292 76* 206 258 123* 168 210 393 494*   IMM GRAN % % 0.3  --  0.6*  --   --  1.0*  --   --  0.2 0.4 0.5   NEUTROS ABS " 10*3/mm3 2.86 4.02 7.09* 5.62 7.62* 2.94 4.16 7.00 2.99 3.18 4.79   LYMPHS ABS 10*3/mm3 0.44*  --  1.11 1.89  --  1.50 1.57  --  1.65 1.68 1.59   MONOS ABS 10*3/mm3 0.39  --  0.39 0.44  --  0.39 0.46  --  0.48 0.41 0.45   EOS ABS 10*3/mm3 0.26 0.12 0.00 0.11 0.15 0.06 0.11 0.41* 0.41* 0.28 0.43*   BASOS ABS 10*3/mm3 0.04 0.06 0.02 0.08  --  0.05 0.04  --  0.06 0.09 0.07   IMMATURE GRANS (ABS) 10*3/mm3 0.01  --  0.05  --   --  0.05  --   --  0.01 0.02 0.04   NRBC /100 WBC 0.0  --  0.0  --   --  0.0  --   --  0.0 0.0 0.0   NEUTROPHIL % %  --  61.6  --   --  45.7  --   --  42.1*  --   --   --    MONOCYTES % %  --  11.1  --   --  16.0*  --   --  13.7*  --   --   --    BASOPHIL % %  --  1.0  --   --   --   --   --   --   --   --   --    ATYP LYMPH % %  --  5.1*  --   --  2.1  --   --  6.3*  --   --   --        Lab Results - Last 18 Months   Lab Units 08/10/22  0754 07/07/22  0827 06/30/22  0733 06/23/22  1418 06/16/22  1423 06/06/22  0846   GLUCOSE mg/dL 107* 116* 138* 94 89 105*   SODIUM mmol/L 139 138 139 140 139 138   POTASSIUM mmol/L 4.2 4.4 3.9 4.8 4.4 4.2   CO2 mmol/L 29.0 29.0 25.0 28.0 28.0 26.0   CHLORIDE mmol/L 102 101 103 104 102 106   ANION GAP mmol/L 8.0 8.0 11.0 8.0 9.0 6.0   CREATININE mg/dL 0.65 0.65 0.74 0.69 0.61 0.55*   BUN mg/dL 14 15 18 14 12 10   BUN / CREAT RATIO  21.5 23.1 24.3 20.3 19.7 18.2   CALCIUM mg/dL 9.6 10.1 9.8 9.4 9.8 9.4   ALK PHOS U/L 79 119* 89 115 152* 96   TOTAL PROTEIN g/dL 6.7 6.1 6.8 6.4 6.7 6.8   ALT (SGPT) U/L 23 29 27 34* 26 26   AST (SGOT) U/L 20 21 18 27 27 20   BILIRUBIN mg/dL 0.2 0.3 0.4 0.2 0.2 0.4   ALBUMIN g/dL 4.20 3.90 4.20 4.00 4.20 4.30   GLOBULIN gm/dL 2.5 2.2 2.6 2.4 2.5 2.5       No results for input(s): MSPIKE, KAPPALAMB, IGLFLC, URICACID, FREEKAPPAL, CEA, LDH, REFLABREPO in the last 48770 hours.    No results for input(s): IRON, TIBC, LABIRON, FERRITIN, E3MFXYR, TSH, FOLATE in the last 92815 hours.    Invalid input(s): VITB12      PAST MEDICAL  HISTORY:  ALLERGIES:  Allergies   Allergen Reactions   • Honey Anaphylaxis     Anaphylaxis as a child - edema at lips as adult.   • Sulfa Antibiotics Rash and Swelling     Edema throat, hands and face -  Hospitalized for 2 days.   • Bupropion Hives   • Nuts Other (See Comments)     Blood in stools     CURRENT MEDICATIONS:  Outpatient Encounter Medications as of 6/15/2022   Medication Sig Dispense Refill   • dexamethasone (DECADRON) 4 MG tablet Start premedication 2 days prior to planned treatment, take one tablet by mouth twice daily x 2 days before txt 40 tablet 0   • diphenhydrAMINE (BENADRYL) 25 mg capsule Take 25 mg by mouth Every 6 (Six) Hours As Needed for Itching or Allergies.     • diphenoxylate-atropine (LOMOTIL) 2.5-0.025 MG per tablet Take 2 tablets po with first loose bowel movement and then 1 tablet po with each loose stool with max of 6 tablets per day 60 tablet 1   • famotidine (PEPCID) 40 MG tablet Take one tablet by mouth once daily 30 tablet 2   • IBUPROFEN PO Take  by mouth As Needed.     • lidocaine-prilocaine (EMLA) 2.5-2.5 % cream 30 minutes prior to access apply generous amount to port site area and cover with clear plastic wrap until ready to access 1 each 2   • ondansetron (ZOFRAN) 8 MG tablet Take 1 tablet by mouth Every 8 (Eight) Hours As Needed for Nausea or Vomiting. 30 tablet 0     No facility-administered encounter medications on file as of 6/15/2022.     Adult illnesses:  Endometrial carcinoma  Seasonal allergies    Past surgeries:  Left breast biopsy-lipoma  Cholecystectomy  Endometrial biopsy, 2015  Multiple lipoma excisions  Tonsillectomy  Endometrial biopsy, 02/24/2022  Robotic assisted total laparoscopic hysterectomy/bilateral salpingo-oophorectomy/sentinel lymph node mapping, vaginal laceration repair, 04/17/2022-Dr. Jay  Left subclavian Mediport, single-lumen, 05/09/2022- Dr. De La Cruz      ADULT ILLNESSES:  Patient Active Problem List   Diagnosis Code   • Uterine cancer  (HCC) C55   • BMI 36.0-36.9,adult Z68.36   • Endometrial carcinoma (HCC) C54.1   • Fitting and adjustment of vascular catheter Z45.2   • Non-smoker Z78.9   • S/P KIMMY-BSO (total abdominal hysterectomy and bilateral salpingo-oophorectomy) Z90.710, Z90.722, Z90.79   • Diarrhea R19.7   • Nausea R11.0     SURGERIES:  Past Surgical History:   Procedure Laterality Date   • BREAST BIOPSY Left     lipoma   • CHOLECYSTECTOMY     • ENDOMETRIAL BIOPSY  2015   • LIPOMA EXCISION      MULTIPLE   • TONSILLECTOMY     • TOTAL LAPAROSCOPIC HYSTERECTOMY N/A 4/7/2022    Procedure: Robotic assisted total laparoscopic hysterectomy, bilateral salpingoophorecotmy, sentinel lymph node mapping.;  Surgeon: Adela Jay DO;  Location: [facility] OR;  Service: Robotics - [facility];  Laterality: N/A;   • VENOUS ACCESS DEVICE (PORT) INSERTION N/A 5/9/2022    Procedure: SINGLE LUMEN PORT PLACEMENT;  Surgeon: Flora De La Cruz MD;  Location: [facility] OR;  Service: General;  Laterality: N/A;     HEALTH MAINTENANCE ITEMS:  Health Maintenance Due   Topic Date Due   • COLORECTAL CANCER SCREENING  Never done   • ANNUAL PHYSICAL  Never done   • ZOSTER VACCINE (1 of 2) Never done   • COVID-19 Vaccine (3 - Moderna risk series) 12/29/2021       <no information>  Last Completed Colonoscopy     This patient has no relevant Health Maintenance data.        Immunization History   Administered Date(s) Administered   • COVID-19 (MODERNA) 1st, 2nd, 3rd Dose Only 03/23/2021, 04/20/2021, 12/01/2021   • COVID-19 (MODERNA) BOOSTER 12/01/2021   • Flu Vaccine Split Quad 01/05/2009   • FluLaval/Fluarix/Fluzone >6 12/01/2021   • Influenza, Unspecified 12/01/2021   • TD Preservative Free 01/04/2008   • Tdap 08/16/2017     Last Completed Mammogram          MAMMOGRAM (Every 2 Years) Next due on 3/17/2024    03/17/2022  Mammo Diagnostic Digital Tomosynthesis Bilateral With CAD    03/01/2022  Mammo Screening Digital Tomosynthesis Bilateral With CAD    08/23/2017  Done        "           FAMILY HISTORY:  Family History   Problem Relation Age of Onset   • Lung cancer Mother    • Anemia Father    • Breast cancer Father    • Melanoma Sister    • Ovarian cancer Neg Hx    • Colon cancer Neg Hx    • Malig Hyperthermia Neg Hx      SOCIAL HISTORY:  Social History     Socioeconomic History   • Marital status:    Tobacco Use   • Smoking status: Never Smoker   • Smokeless tobacco: Never Used   Vaping Use   • Vaping Use: Never used   Substance and Sexual Activity   • Alcohol use: Yes     Alcohol/week: 1.0 standard drink     Types: 1 Cans of beer per week     Comment: occ.   • Drug use: Never   • Sexual activity: Yes     Partners: Male     Birth control/protection: None       REVIEW OF SYSTEMS:  Review of Systems   Constitutional: Negative for chills, diaphoresis (after surgery for ~ 2 weeks, \"gone.\"), fatigue (\"after surgery but better\") and fever.        Manages her ADLs, to include chores, errands and driving.  Is up and about, \"all the time.\" Is walking 2 miles in the mornings.    Chemotherapy tolerance: Mild fatigue for 2 days.  No mouth sores.  No nausea.  No vomiting.  No skin changes.  No neuropathy.  Loose stools without overt diarrhea.  Says used 4 doses of Imodium (once daily)   HENT: Positive for postnasal drip.    Eyes: Negative.    Respiratory: Negative.    Cardiovascular: Negative.    Gastrointestinal: Negative.    Endocrine: Negative.  Negative for heat intolerance.   Genitourinary: Negative.  Negative for dysuria and hematuria.        \"Vaginal spotting\" post-op resolved   Musculoskeletal: Negative.    Skin: Negative.    Allergic/Immunologic: Positive for environmental allergies (\"Hayfever\").        Allergic to sulfa   Neurological: Negative.    Hematological: Negative.    Psychiatric/Behavioral: Negative.        /76   Pulse 85   Temp 96.6 °F (35.9 °C)   Resp 16   Ht 165.1 cm (65\")   Wt 95.7 kg (211 lb)   SpO2 97%   Breastfeeding No   BMI 35.11 kg/m²  Body surface " area is 2.02 meters squared.  Pain Score    06/15/22 0851   PainSc: 0-No pain       Physical Exam:  Physical Exam  Vitals reviewed.   Constitutional:       Appearance: She is obese.      Comments: Pleasant, cooperative, obese, modestly kept female.  Ambulatory.  ECOG 0.      She has lost 9 pounds since her initial visit   HENT:      Head: Normocephalic and atraumatic.      Mouth/Throat:      Comments: Is wearing a surgical mask today  Eyes:      General: No scleral icterus.     Extraocular Movements: Extraocular movements intact.      Pupils: Pupils are equal, round, and reactive to light.   Cardiovascular:      Rate and Rhythm: Normal rate.   Pulmonary:      Effort: Pulmonary effort is normal.      Comments: Port in the left upper chest is well-seated  Abdominal:      Palpations: Abdomen is soft.      Tenderness: There is no abdominal tenderness.      Comments: Laparoscopy incisions are all well approximated and healing nicely.   Musculoskeletal:         General: Normal range of motion.      Cervical back: Normal range of motion.   Lymphadenopathy:      Cervical: No cervical adenopathy.   Skin:     General: Skin is warm.   Neurological:      General: No focal deficit present.      Mental Status: She is alert and oriented to person, place, and time.   Psychiatric:         Mood and Affect: Mood normal.         Behavior: Behavior normal.         Thought Content: Thought content normal.           Assessment:  1.    Serous carcinoma of the endometrium  · Stage: FIGO IA (pT1a, pN0(sn), pM0).    · Tumor [place]: The tumor measures 4.2 x 3.5 x 1.5 cm. Extends into the myometrium a total of 6.25 mm out of a total thickness of 13 mm (48%).  Extends into the lower uterine segment but does not invade the stroma. 6 regional lymph nodes negative for tumor cells   · Complications of Tumor: Postmenopausal bleeding  · Mismatch repair (MMR) proteins: Pending  · p16: strong (+)  · Pax8:  strong (+)  · Her2: 1+  · ER/MD:(-)/(+)  patchy  · Tumor Status:-- 04/07/2022- robotic assisted total laparoscopic hysterectomy, bilateral salpingo-oophorectomy, sentinel lymph node mapping by Dr. Jay.  · : 13.1, 06/02/2022 (prior: 37.1)  · Adjuvant carboplatin and Taxol in progress   · Anticipate adjuvant radiation (EBRT + brachytherapy)     2.    Thrombocytopenia.  Chemo effect  --123,000, 06/02/2022 (prior range: 168,000-494,000)     Plan:  1.  Apprised of baseline labs, 04/27/2022-06/02/2022 with normal CMP (resolution of elevated ALT/AST), normal  (13.1-prior: 37.1), nonreactive hepatitis profile, platelets 123,000 otherwise normal CBC.  Chemo tolerance discussed.  Mild fatigue and bouts of loose stool easily controlled by 1 dose of Imodium daily.  No other overt toxicities.  2.  Apprised of CT, 05/02/2022 (above) and pelvic ultrasound, 05/17/2022 (above).  No metastatic disease to the chest.  Left iliac chain collection/lesion.  Favor postoperative lymphocele especially given recent niecy dissection.  Continued follow-up recommended vs MRI.  3.  Review NCCN guidelines version 1.2022 endometrial carcinoma-biopsy findings: Serous carcinoma following primary treatment with KIMMY/BSO, surgical staging and maximal tumor debulking.  For invasive stage IA-additional treatment: Systemic therapy (preferred regimens: Carboplatin/paclitaxel- primary adjuvant treatment with use for uterine confined high risk disease) +/-EBRT+/-vaginal brachytherapy-followed by surveillance: Physical exam every 3-6 months for 2-3 years, then every 6 months for up to 5 years then annually.  CA-125 if initially elevated.  Imaging as clinically indicated (abdominal/pelvic and/or chest CT recommended based upon symptoms or physical exam findings).  4.   Re: Toxicities of chemotherapy are noted. CARBOPLATIN (to include, but not limited to: Myelosuppression thrombocytopenia, anemia, leukopenia, and neutropenia, nausea, vomiting, electrolyte abnormalities, liver  enzymes abnormalities, nephrotoxicity, O2 toxicity, neuropathy, pain, asthenia, paresthesias, abdominal pain, diarrhea, constipation, mucositis, bleeding, alopecia, vision loss, anaphylactoid reactions) and TAXOL (to include, but not limited to: Myelosuppression (neutropenia, anemia, thrombocytopenia), alopecia, neuropathy, arthralgia, myalgia, nausea, vomiting, hypersensitivity reactions, mucositis, diarrhea, infection, abnormal liver enzymes, asthenia, fever, hypertension, bleeding, edema, opportunistic infections, anaphylaxis, cardiac conduction disturbance/arrhythmias, syncope, thromboembolism, myocardial infarction, severe injection site reactions, pulmonary toxicity, GI obstruction/perforation, paralytic ileus, ischemic colitis, pancreatitis, hepatotoxicity, severe skin reactions). Questions answered to the patient's apparent satisfaction. She agrees to press on with therapy.     5. Schedule (Plan: Every 21 days x6 cycles) C3, 06/30/2022 at [facility].      · Carboplatin AUC of 6 (Total Dose = pending) every 3 weeks   · Taxol 175 mg/m2 (BSA = ; Total Dose =  mg ) every 3 weeks     6.  Premeds:   · Aloxi 0.25 mg IV   · Emend 150 mg IV   · Decadron 10 mg IV   · Pepcid 20 mg IV   · Benadryl 25 mg IV     7.   Neulasta 6 mg sc on day 2 of chemo at [facility].   8.  CMP, magnesium level, and CBC with differential weekly and on day of chemotherapy. Procrit 40,000 units subcutaneously if hemoglobin less than 10 and hematocrit less than 30. Zarxio/Neupogen 480 mcg daily x3 if ANC less than 1.0.   11.  Rx:  Zofran 8 mg po tid prn # 30    12.  Continue other currently identified medications.   13.  Review consult, 04/27/2022 by Dr. Miller.  Recommends treatment to the pelvis with curative radiation therapy, anticipate 4500 cGy over 25-28 fractions, final course pending completion of planning.  In addition recommend 3 HDR brachytherapy fractions, final course pending.  14.  Reappoint to Dr. Miller after C3 chemo Re: Initiation of  adjuvant radiation   15.  Return to the office on 08/10/2021with pre-office CBC with differential, CMP, .  We will schedule resumption of chemotherapy (C4-C6) at that visit (anticipated completion of radiation)    I spent ~55 minutes caring for Rachel on this date of service. This time includes time spent by me in the following activities: preparing for the visit, reviewing tests, performing a medically appropriate examination and/or evaluation, counseling and educating the patient/family/caregiver, ordering medications, tests, or procedures and documenting information in the medical record

## 2022-06-10 ENCOUNTER — INFUSION (OUTPATIENT)
Dept: ONCOLOGY | Facility: HOSPITAL | Age: 62
End: 2022-06-10

## 2022-06-10 DIAGNOSIS — C54.1 ENDOMETRIAL CARCINOMA: Primary | ICD-10-CM

## 2022-06-10 PROCEDURE — 96372 THER/PROPH/DIAG INJ SC/IM: CPT

## 2022-06-10 PROCEDURE — 25010000002 PEGFILGRASTIM 6 MG/0.6ML SOLUTION PREFILLED SYRINGE: Performed by: INTERNAL MEDICINE

## 2022-06-10 RX ADMIN — PEGFILGRASTIM 6 MG: 6 INJECTION SUBCUTANEOUS at 14:16

## 2022-06-15 ENCOUNTER — OFFICE VISIT (OUTPATIENT)
Dept: ONCOLOGY | Facility: CLINIC | Age: 62
End: 2022-06-15

## 2022-06-15 VITALS
DIASTOLIC BLOOD PRESSURE: 76 MMHG | BODY MASS INDEX: 35.16 KG/M2 | WEIGHT: 211 LBS | RESPIRATION RATE: 16 BRPM | HEART RATE: 85 BPM | TEMPERATURE: 96.6 F | SYSTOLIC BLOOD PRESSURE: 128 MMHG | HEIGHT: 65 IN | OXYGEN SATURATION: 97 %

## 2022-06-15 DIAGNOSIS — C54.1 ENDOMETRIAL CARCINOMA: Primary | ICD-10-CM

## 2022-06-15 PROCEDURE — 99215 OFFICE O/P EST HI 40 MIN: CPT | Performed by: INTERNAL MEDICINE

## 2022-06-16 ENCOUNTER — INFUSION (OUTPATIENT)
Dept: ONCOLOGY | Facility: HOSPITAL | Age: 62
End: 2022-06-16

## 2022-06-16 ENCOUNTER — LAB (OUTPATIENT)
Dept: LAB | Facility: HOSPITAL | Age: 62
End: 2022-06-16

## 2022-06-16 DIAGNOSIS — C54.1 ENDOMETRIAL CARCINOMA: ICD-10-CM

## 2022-06-16 LAB
ALBUMIN SERPL-MCNC: 4.2 G/DL (ref 3.5–5.2)
ALBUMIN/GLOB SERPL: 1.7 G/DL
ALP SERPL-CCNC: 152 U/L (ref 39–117)
ALT SERPL W P-5'-P-CCNC: 26 U/L (ref 1–33)
ANION GAP SERPL CALCULATED.3IONS-SCNC: 9 MMOL/L (ref 5–15)
AST SERPL-CCNC: 27 U/L (ref 1–32)
BILIRUB SERPL-MCNC: 0.2 MG/DL (ref 0–1.2)
BUN SERPL-MCNC: 12 MG/DL (ref 8–23)
BUN/CREAT SERPL: 19.7 (ref 7–25)
CALCIUM SPEC-SCNC: 9.8 MG/DL (ref 8.6–10.5)
CHLORIDE SERPL-SCNC: 102 MMOL/L (ref 98–107)
CO2 SERPL-SCNC: 28 MMOL/L (ref 22–29)
CREAT SERPL-MCNC: 0.61 MG/DL (ref 0.57–1)
DEPRECATED RDW RBC AUTO: 51.3 FL (ref 37–54)
EGFRCR SERPLBLD CKD-EPI 2021: 100.6 ML/MIN/1.73
EOSINOPHIL # BLD MANUAL: 0.15 10*3/MM3 (ref 0–0.4)
EOSINOPHIL NFR BLD MANUAL: 1.1 % (ref 0.3–6.2)
ERYTHROCYTE [DISTWIDTH] IN BLOOD BY AUTOMATED COUNT: 16.3 % (ref 12.3–15.4)
GLOBULIN UR ELPH-MCNC: 2.5 GM/DL
GLUCOSE SERPL-MCNC: 89 MG/DL (ref 65–99)
HCT VFR BLD AUTO: 40.5 % (ref 34–46.6)
HGB BLD-MCNC: 13.1 G/DL (ref 12–15.9)
LYMPHOCYTES # BLD MANUAL: 2.53 10*3/MM3 (ref 0.7–3.1)
LYMPHOCYTES NFR BLD MANUAL: 16 % (ref 5–12)
MAGNESIUM SERPL-MCNC: 2 MG/DL (ref 1.6–2.4)
MCH RBC QN AUTO: 28.5 PG (ref 26.6–33)
MCHC RBC AUTO-ENTMCNC: 32.3 G/DL (ref 31.5–35.7)
MCV RBC AUTO: 88 FL (ref 79–97)
METAMYELOCYTES NFR BLD MANUAL: 1.1 % (ref 0–0)
MONOCYTES # BLD: 2.12 10*3/MM3 (ref 0.1–0.9)
MYELOCYTES NFR BLD MANUAL: 2.1 % (ref 0–0)
NEUTROPHILS # BLD AUTO: 7.62 10*3/MM3 (ref 1.7–7)
NEUTROPHILS NFR BLD MANUAL: 45.7 % (ref 42.7–76)
NEUTS BAND NFR BLD MANUAL: 11.7 % (ref 0–5)
PLAT MORPH BLD: NORMAL
PLATELET # BLD AUTO: 206 10*3/MM3 (ref 140–450)
PMV BLD AUTO: 10.8 FL (ref 6–12)
POTASSIUM SERPL-SCNC: 4.4 MMOL/L (ref 3.5–5.2)
PROMYELOCYTES NFR BLD MANUAL: 3.2 % (ref 0–0)
PROT SERPL-MCNC: 6.7 G/DL (ref 6–8.5)
RBC # BLD AUTO: 4.6 10*6/MM3 (ref 3.77–5.28)
RBC MORPH BLD: NORMAL
SODIUM SERPL-SCNC: 139 MMOL/L (ref 136–145)
TOXIC GRANULATION: ABNORMAL
VARIANT LYMPHS NFR BLD MANUAL: 17 % (ref 19.6–45.3)
VARIANT LYMPHS NFR BLD MANUAL: 2.1 % (ref 0–5)
WBC NRBC COR # BLD: 13.26 10*3/MM3 (ref 3.4–10.8)

## 2022-06-16 PROCEDURE — 85025 COMPLETE CBC W/AUTO DIFF WBC: CPT

## 2022-06-16 PROCEDURE — 83735 ASSAY OF MAGNESIUM: CPT

## 2022-06-16 PROCEDURE — 36415 COLL VENOUS BLD VENIPUNCTURE: CPT

## 2022-06-16 PROCEDURE — 85007 BL SMEAR W/DIFF WBC COUNT: CPT

## 2022-06-16 PROCEDURE — 80053 COMPREHEN METABOLIC PANEL: CPT

## 2022-06-16 NOTE — PROGRESS NOTES
1500 Patient discharged from [facility] labwork does not meet criteria for injection. Marlyn Varela RN

## 2022-06-23 ENCOUNTER — LAB (OUTPATIENT)
Dept: LAB | Facility: HOSPITAL | Age: 62
End: 2022-06-23

## 2022-06-23 ENCOUNTER — INFUSION (OUTPATIENT)
Dept: ONCOLOGY | Facility: HOSPITAL | Age: 62
End: 2022-06-23

## 2022-06-23 VITALS
TEMPERATURE: 97.6 F | HEIGHT: 65 IN | RESPIRATION RATE: 18 BRPM | HEART RATE: 58 BPM | BODY MASS INDEX: 35.82 KG/M2 | DIASTOLIC BLOOD PRESSURE: 61 MMHG | OXYGEN SATURATION: 100 % | SYSTOLIC BLOOD PRESSURE: 137 MMHG | WEIGHT: 215 LBS

## 2022-06-23 DIAGNOSIS — C54.1 ENDOMETRIAL CARCINOMA: ICD-10-CM

## 2022-06-23 LAB
ALBUMIN SERPL-MCNC: 4 G/DL (ref 3.5–5.2)
ALBUMIN/GLOB SERPL: 1.7 G/DL
ALP SERPL-CCNC: 115 U/L (ref 39–117)
ALT SERPL W P-5'-P-CCNC: 34 U/L (ref 1–33)
ANION GAP SERPL CALCULATED.3IONS-SCNC: 8 MMOL/L (ref 5–15)
AST SERPL-CCNC: 27 U/L (ref 1–32)
BASOPHILS # BLD AUTO: 0.08 10*3/MM3 (ref 0–0.2)
BASOPHILS NFR BLD AUTO: 1 % (ref 0–1.5)
BILIRUB SERPL-MCNC: 0.2 MG/DL (ref 0–1.2)
BUN SERPL-MCNC: 14 MG/DL (ref 8–23)
BUN/CREAT SERPL: 20.3 (ref 7–25)
CALCIUM SPEC-SCNC: 9.4 MG/DL (ref 8.6–10.5)
CHLORIDE SERPL-SCNC: 104 MMOL/L (ref 98–107)
CO2 SERPL-SCNC: 28 MMOL/L (ref 22–29)
CREAT SERPL-MCNC: 0.69 MG/DL (ref 0.57–1)
DEPRECATED RDW RBC AUTO: 51.5 FL (ref 37–54)
EGFRCR SERPLBLD CKD-EPI 2021: 97.7 ML/MIN/1.73
EOSINOPHIL # BLD AUTO: 0.11 10*3/MM3 (ref 0–0.4)
EOSINOPHIL NFR BLD AUTO: 1.3 % (ref 0.3–6.2)
ERYTHROCYTE [DISTWIDTH] IN BLOOD BY AUTOMATED COUNT: 16.9 % (ref 12.3–15.4)
GLOBULIN UR ELPH-MCNC: 2.4 GM/DL
GLUCOSE SERPL-MCNC: 94 MG/DL (ref 65–99)
HCT VFR BLD AUTO: 38.2 % (ref 34–46.6)
HGB BLD-MCNC: 12.7 G/DL (ref 12–15.9)
LYMPHOCYTES # BLD AUTO: 1.89 10*3/MM3 (ref 0.7–3.1)
LYMPHOCYTES NFR BLD AUTO: 22.6 % (ref 19.6–45.3)
MAGNESIUM SERPL-MCNC: 2.2 MG/DL (ref 1.6–2.4)
MCH RBC QN AUTO: 28.7 PG (ref 26.6–33)
MCHC RBC AUTO-ENTMCNC: 33.2 G/DL (ref 31.5–35.7)
MCV RBC AUTO: 86.4 FL (ref 79–97)
MONOCYTES # BLD AUTO: 0.44 10*3/MM3 (ref 0.1–0.9)
MONOCYTES NFR BLD AUTO: 5.3 % (ref 5–12)
NEUTROPHILS NFR BLD AUTO: 5.62 10*3/MM3 (ref 1.7–7)
NEUTROPHILS NFR BLD AUTO: 66.9 % (ref 42.7–76)
PLATELET # BLD AUTO: 76 10*3/MM3 (ref 140–450)
PMV BLD AUTO: 10.1 FL (ref 6–12)
POTASSIUM SERPL-SCNC: 4.8 MMOL/L (ref 3.5–5.2)
PROT SERPL-MCNC: 6.4 G/DL (ref 6–8.5)
RBC # BLD AUTO: 4.42 10*6/MM3 (ref 3.77–5.28)
SODIUM SERPL-SCNC: 140 MMOL/L (ref 136–145)
WBC NRBC COR # BLD: 8.38 10*3/MM3 (ref 3.4–10.8)

## 2022-06-23 PROCEDURE — 80053 COMPREHEN METABOLIC PANEL: CPT

## 2022-06-23 PROCEDURE — 83735 ASSAY OF MAGNESIUM: CPT

## 2022-06-23 PROCEDURE — 36415 COLL VENOUS BLD VENIPUNCTURE: CPT

## 2022-06-23 PROCEDURE — 85025 COMPLETE CBC W/AUTO DIFF WBC: CPT

## 2022-06-23 NOTE — PROGRESS NOTES
1503 Patients labs did not meet criteria for injection. Patient discharged no complaints or concerns at this time. Marlyn Varela RN

## 2022-06-24 RX ORDER — PALONOSETRON 0.05 MG/ML
0.25 INJECTION, SOLUTION INTRAVENOUS ONCE
Status: CANCELLED | OUTPATIENT
Start: 2022-06-30

## 2022-06-24 RX ORDER — DIPHENHYDRAMINE HYDROCHLORIDE 50 MG/ML
50 INJECTION INTRAMUSCULAR; INTRAVENOUS AS NEEDED
Status: CANCELLED | OUTPATIENT
Start: 2022-06-30

## 2022-06-24 RX ORDER — DIPHENHYDRAMINE HYDROCHLORIDE 50 MG/ML
50 INJECTION INTRAMUSCULAR; INTRAVENOUS ONCE
Status: CANCELLED
Start: 2022-06-30 | End: 2022-06-30

## 2022-06-24 RX ORDER — FAMOTIDINE 10 MG/ML
20 INJECTION, SOLUTION INTRAVENOUS AS NEEDED
Status: CANCELLED | OUTPATIENT
Start: 2022-06-30

## 2022-06-24 RX ORDER — FAMOTIDINE 10 MG/ML
20 INJECTION, SOLUTION INTRAVENOUS ONCE
Status: CANCELLED
Start: 2022-06-30 | End: 2022-06-30

## 2022-06-24 RX ORDER — SODIUM CHLORIDE 9 MG/ML
250 INJECTION, SOLUTION INTRAVENOUS ONCE
Status: CANCELLED | OUTPATIENT
Start: 2022-06-30

## 2022-06-30 ENCOUNTER — LAB (OUTPATIENT)
Dept: LAB | Facility: HOSPITAL | Age: 62
End: 2022-06-30

## 2022-06-30 ENCOUNTER — INFUSION (OUTPATIENT)
Dept: ONCOLOGY | Facility: HOSPITAL | Age: 62
End: 2022-06-30

## 2022-06-30 VITALS
DIASTOLIC BLOOD PRESSURE: 69 MMHG | OXYGEN SATURATION: 99 % | WEIGHT: 211 LBS | TEMPERATURE: 97.6 F | HEART RATE: 52 BPM | BODY MASS INDEX: 35.16 KG/M2 | RESPIRATION RATE: 18 BRPM | SYSTOLIC BLOOD PRESSURE: 124 MMHG | HEIGHT: 65 IN

## 2022-06-30 DIAGNOSIS — Z45.2 FITTING AND ADJUSTMENT OF VASCULAR CATHETER: ICD-10-CM

## 2022-06-30 DIAGNOSIS — C54.1 ENDOMETRIAL CARCINOMA: ICD-10-CM

## 2022-06-30 DIAGNOSIS — C54.1 ENDOMETRIAL CARCINOMA: Primary | ICD-10-CM

## 2022-06-30 LAB
ALBUMIN SERPL-MCNC: 4.2 G/DL (ref 3.5–5.2)
ALBUMIN/GLOB SERPL: 1.6 G/DL
ALP SERPL-CCNC: 89 U/L (ref 39–117)
ALT SERPL W P-5'-P-CCNC: 27 U/L (ref 1–33)
ANION GAP SERPL CALCULATED.3IONS-SCNC: 11 MMOL/L (ref 5–15)
AST SERPL-CCNC: 18 U/L (ref 1–32)
BASOPHILS # BLD AUTO: 0.02 10*3/MM3 (ref 0–0.2)
BASOPHILS NFR BLD AUTO: 0.2 % (ref 0–1.5)
BILIRUB SERPL-MCNC: 0.4 MG/DL (ref 0–1.2)
BUN SERPL-MCNC: 18 MG/DL (ref 8–23)
BUN/CREAT SERPL: 24.3 (ref 7–25)
CALCIUM SPEC-SCNC: 9.8 MG/DL (ref 8.6–10.5)
CHLORIDE SERPL-SCNC: 103 MMOL/L (ref 98–107)
CO2 SERPL-SCNC: 25 MMOL/L (ref 22–29)
CREAT SERPL-MCNC: 0.74 MG/DL (ref 0.57–1)
DEPRECATED RDW RBC AUTO: 56.2 FL (ref 37–54)
EGFRCR SERPLBLD CKD-EPI 2021: 91 ML/MIN/1.73
EOSINOPHIL # BLD AUTO: 0 10*3/MM3 (ref 0–0.4)
EOSINOPHIL NFR BLD AUTO: 0 % (ref 0.3–6.2)
ERYTHROCYTE [DISTWIDTH] IN BLOOD BY AUTOMATED COUNT: 18.1 % (ref 12.3–15.4)
GLOBULIN UR ELPH-MCNC: 2.6 GM/DL
GLUCOSE SERPL-MCNC: 138 MG/DL (ref 65–99)
HCT VFR BLD AUTO: 38.7 % (ref 34–46.6)
HGB BLD-MCNC: 12.8 G/DL (ref 12–15.9)
IMM GRANULOCYTES # BLD AUTO: 0.05 10*3/MM3 (ref 0–0.05)
IMM GRANULOCYTES NFR BLD AUTO: 0.6 % (ref 0–0.5)
LYMPHOCYTES # BLD AUTO: 1.11 10*3/MM3 (ref 0.7–3.1)
LYMPHOCYTES NFR BLD AUTO: 12.8 % (ref 19.6–45.3)
MAGNESIUM SERPL-MCNC: 2.1 MG/DL (ref 1.6–2.4)
MCH RBC QN AUTO: 28.7 PG (ref 26.6–33)
MCHC RBC AUTO-ENTMCNC: 33.1 G/DL (ref 31.5–35.7)
MCV RBC AUTO: 86.8 FL (ref 79–97)
MONOCYTES # BLD AUTO: 0.39 10*3/MM3 (ref 0.1–0.9)
MONOCYTES NFR BLD AUTO: 4.5 % (ref 5–12)
NEUTROPHILS NFR BLD AUTO: 7.09 10*3/MM3 (ref 1.7–7)
NEUTROPHILS NFR BLD AUTO: 81.9 % (ref 42.7–76)
NRBC BLD AUTO-RTO: 0 /100 WBC (ref 0–0.2)
PLATELET # BLD AUTO: 292 10*3/MM3 (ref 140–450)
PMV BLD AUTO: 9.4 FL (ref 6–12)
POTASSIUM SERPL-SCNC: 3.9 MMOL/L (ref 3.5–5.2)
PROT SERPL-MCNC: 6.8 G/DL (ref 6–8.5)
RBC # BLD AUTO: 4.46 10*6/MM3 (ref 3.77–5.28)
SODIUM SERPL-SCNC: 139 MMOL/L (ref 136–145)
WBC NRBC COR # BLD: 8.66 10*3/MM3 (ref 3.4–10.8)

## 2022-06-30 PROCEDURE — 25010000002 DEXAMETHASONE SODIUM PHOSPHATE 100 MG/10ML SOLUTION: Performed by: INTERNAL MEDICINE

## 2022-06-30 PROCEDURE — 25010000002 PALONOSETRON PER 25 MCG: Performed by: INTERNAL MEDICINE

## 2022-06-30 PROCEDURE — 25010000002 PACLITAXEL PER 1 MG: Performed by: INTERNAL MEDICINE

## 2022-06-30 PROCEDURE — 96413 CHEMO IV INFUSION 1 HR: CPT

## 2022-06-30 PROCEDURE — 96375 TX/PRO/DX INJ NEW DRUG ADDON: CPT

## 2022-06-30 PROCEDURE — 25010000002 HEPARIN LOCK FLUSH PER 10 UNITS: Performed by: OBSTETRICS & GYNECOLOGY

## 2022-06-30 PROCEDURE — 96415 CHEMO IV INFUSION ADDL HR: CPT

## 2022-06-30 PROCEDURE — 36415 COLL VENOUS BLD VENIPUNCTURE: CPT

## 2022-06-30 PROCEDURE — 96367 TX/PROPH/DG ADDL SEQ IV INF: CPT

## 2022-06-30 PROCEDURE — 80053 COMPREHEN METABOLIC PANEL: CPT

## 2022-06-30 PROCEDURE — 83735 ASSAY OF MAGNESIUM: CPT

## 2022-06-30 PROCEDURE — 96417 CHEMO IV INFUS EACH ADDL SEQ: CPT

## 2022-06-30 PROCEDURE — 25010000002 FOSAPREPITANT PER 1 MG: Performed by: INTERNAL MEDICINE

## 2022-06-30 PROCEDURE — 85025 COMPLETE CBC W/AUTO DIFF WBC: CPT

## 2022-06-30 PROCEDURE — 25010000002 CARBOPLATIN PER 50 MG: Performed by: INTERNAL MEDICINE

## 2022-06-30 PROCEDURE — 25010000002 DIPHENHYDRAMINE PER 50 MG: Performed by: INTERNAL MEDICINE

## 2022-06-30 RX ORDER — FAMOTIDINE 10 MG/ML
20 INJECTION, SOLUTION INTRAVENOUS ONCE
Status: COMPLETED | OUTPATIENT
Start: 2022-06-30 | End: 2022-06-30

## 2022-06-30 RX ORDER — HEPARIN SODIUM (PORCINE) LOCK FLUSH IV SOLN 100 UNIT/ML 100 UNIT/ML
500 SOLUTION INTRAVENOUS AS NEEDED
Status: DISCONTINUED | OUTPATIENT
Start: 2022-06-30 | End: 2022-06-30 | Stop reason: HOSPADM

## 2022-06-30 RX ORDER — SODIUM CHLORIDE 9 MG/ML
250 INJECTION, SOLUTION INTRAVENOUS ONCE
Status: COMPLETED | OUTPATIENT
Start: 2022-06-30 | End: 2022-06-30

## 2022-06-30 RX ORDER — HEPARIN SODIUM (PORCINE) LOCK FLUSH IV SOLN 100 UNIT/ML 100 UNIT/ML
500 SOLUTION INTRAVENOUS AS NEEDED
Status: CANCELLED | OUTPATIENT
Start: 2022-06-30

## 2022-06-30 RX ORDER — PALONOSETRON 0.05 MG/ML
0.25 INJECTION, SOLUTION INTRAVENOUS ONCE
Status: COMPLETED | OUTPATIENT
Start: 2022-06-30 | End: 2022-06-30

## 2022-06-30 RX ORDER — DIPHENHYDRAMINE HYDROCHLORIDE 50 MG/ML
50 INJECTION INTRAMUSCULAR; INTRAVENOUS AS NEEDED
Status: DISCONTINUED | OUTPATIENT
Start: 2022-06-30 | End: 2022-06-30 | Stop reason: HOSPADM

## 2022-06-30 RX ORDER — FAMOTIDINE 10 MG/ML
20 INJECTION, SOLUTION INTRAVENOUS AS NEEDED
Status: DISCONTINUED | OUTPATIENT
Start: 2022-06-30 | End: 2022-06-30 | Stop reason: HOSPADM

## 2022-06-30 RX ORDER — DIPHENHYDRAMINE HYDROCHLORIDE 50 MG/ML
50 INJECTION INTRAMUSCULAR; INTRAVENOUS ONCE
Status: DISCONTINUED | OUTPATIENT
Start: 2022-06-30 | End: 2022-06-30

## 2022-06-30 RX ORDER — SODIUM CHLORIDE 0.9 % (FLUSH) 0.9 %
10 SYRINGE (ML) INJECTION AS NEEDED
Status: DISCONTINUED | OUTPATIENT
Start: 2022-06-30 | End: 2022-06-30 | Stop reason: HOSPADM

## 2022-06-30 RX ORDER — SODIUM CHLORIDE 0.9 % (FLUSH) 0.9 %
10 SYRINGE (ML) INJECTION AS NEEDED
Status: CANCELLED | OUTPATIENT
Start: 2022-06-30

## 2022-06-30 RX ADMIN — PACLITAXEL 365 MG: 6 INJECTION, SOLUTION INTRAVENOUS at 09:27

## 2022-06-30 RX ADMIN — DIPHENHYDRAMINE HYDROCHLORIDE 50 MG: 50 INJECTION INTRAMUSCULAR; INTRAVENOUS at 08:15

## 2022-06-30 RX ADMIN — SODIUM CHLORIDE 250 ML: 9 INJECTION, SOLUTION INTRAVENOUS at 08:10

## 2022-06-30 RX ADMIN — Medication 10 ML: at 13:12

## 2022-06-30 RX ADMIN — FOSAPREPITANT 150 MG: 150 INJECTION, POWDER, LYOPHILIZED, FOR SOLUTION INTRAVENOUS at 08:54

## 2022-06-30 RX ADMIN — FAMOTIDINE 20 MG: 10 INJECTION, SOLUTION INTRAVENOUS at 08:10

## 2022-06-30 RX ADMIN — Medication 500 UNITS: at 13:13

## 2022-06-30 RX ADMIN — CARBOPLATIN 540 MG: 10 INJECTION, SOLUTION INTRAVENOUS at 12:34

## 2022-06-30 RX ADMIN — PALONOSETRON HYDROCHLORIDE 0.25 MG: 0.25 INJECTION INTRAVENOUS at 08:10

## 2022-06-30 RX ADMIN — DEXAMETHASONE SODIUM PHOSPHATE 20 MG: 10 INJECTION, SOLUTION INTRAMUSCULAR; INTRAVENOUS at 08:36

## 2022-07-01 ENCOUNTER — INFUSION (OUTPATIENT)
Dept: ONCOLOGY | Facility: HOSPITAL | Age: 62
End: 2022-07-01

## 2022-07-01 VITALS
OXYGEN SATURATION: 100 % | HEART RATE: 64 BPM | WEIGHT: 213.6 LBS | DIASTOLIC BLOOD PRESSURE: 58 MMHG | TEMPERATURE: 97.8 F | SYSTOLIC BLOOD PRESSURE: 137 MMHG | BODY MASS INDEX: 35.59 KG/M2 | HEIGHT: 65 IN | RESPIRATION RATE: 18 BRPM

## 2022-07-01 DIAGNOSIS — C54.1 ENDOMETRIAL CARCINOMA: Primary | ICD-10-CM

## 2022-07-01 PROCEDURE — 25010000002 PEGFILGRASTIM 6 MG/0.6ML SOLUTION PREFILLED SYRINGE: Performed by: INTERNAL MEDICINE

## 2022-07-01 PROCEDURE — 96372 THER/PROPH/DIAG INJ SC/IM: CPT

## 2022-07-01 RX ADMIN — PEGFILGRASTIM 6 MG: 6 INJECTION SUBCUTANEOUS at 13:51

## 2022-07-05 ENCOUNTER — HOSPITAL ENCOUNTER (OUTPATIENT)
Dept: RADIATION ONCOLOGY | Facility: HOSPITAL | Age: 62
Setting detail: RADIATION/ONCOLOGY SERIES
End: 2022-07-05

## 2022-07-05 PROCEDURE — 77334 RADIATION TREATMENT AID(S): CPT | Performed by: RADIOLOGY

## 2022-07-07 ENCOUNTER — LAB (OUTPATIENT)
Dept: LAB | Facility: HOSPITAL | Age: 62
End: 2022-07-07

## 2022-07-07 DIAGNOSIS — C54.1 ENDOMETRIAL CARCINOMA: ICD-10-CM

## 2022-07-07 LAB
ALBUMIN SERPL-MCNC: 3.9 G/DL (ref 3.5–5.2)
ALBUMIN/GLOB SERPL: 1.8 G/DL
ALP SERPL-CCNC: 119 U/L (ref 39–117)
ALT SERPL W P-5'-P-CCNC: 29 U/L (ref 1–33)
ANION GAP SERPL CALCULATED.3IONS-SCNC: 8 MMOL/L (ref 5–15)
AST SERPL-CCNC: 21 U/L (ref 1–32)
BASOPHILS # BLD MANUAL: 0.06 10*3/MM3 (ref 0–0.2)
BASOPHILS NFR BLD MANUAL: 1 % (ref 0–1.5)
BILIRUB SERPL-MCNC: 0.3 MG/DL (ref 0–1.2)
BUN SERPL-MCNC: 15 MG/DL (ref 8–23)
BUN/CREAT SERPL: 23.1 (ref 7–25)
CALCIUM SPEC-SCNC: 10.1 MG/DL (ref 8.6–10.5)
CHLORIDE SERPL-SCNC: 101 MMOL/L (ref 98–107)
CLUMPED PLATELETS: PRESENT
CO2 SERPL-SCNC: 29 MMOL/L (ref 22–29)
CREAT SERPL-MCNC: 0.65 MG/DL (ref 0.57–1)
DEPRECATED RDW RBC AUTO: 57 FL (ref 37–54)
EGFRCR SERPLBLD CKD-EPI 2021: 99.1 ML/MIN/1.73
EOSINOPHIL # BLD MANUAL: 0.12 10*3/MM3 (ref 0–0.4)
EOSINOPHIL NFR BLD MANUAL: 2 % (ref 0.3–6.2)
ERYTHROCYTE [DISTWIDTH] IN BLOOD BY AUTOMATED COUNT: 17.6 % (ref 12.3–15.4)
GLOBULIN UR ELPH-MCNC: 2.2 GM/DL
GLUCOSE SERPL-MCNC: 116 MG/DL (ref 65–99)
HCT VFR BLD AUTO: 37.4 % (ref 34–46.6)
HGB BLD-MCNC: 12.2 G/DL (ref 12–15.9)
LYMPHOCYTES # BLD MANUAL: 1.24 10*3/MM3 (ref 0.7–3.1)
LYMPHOCYTES NFR BLD MANUAL: 11.1 % (ref 5–12)
MAGNESIUM SERPL-MCNC: 1.7 MG/DL (ref 1.6–2.4)
MCH RBC QN AUTO: 29 PG (ref 26.6–33)
MCHC RBC AUTO-ENTMCNC: 32.6 G/DL (ref 31.5–35.7)
MCV RBC AUTO: 89 FL (ref 79–97)
MONOCYTES # BLD: 0.68 10*3/MM3 (ref 0.1–0.9)
NEUTROPHILS # BLD AUTO: 4.02 10*3/MM3 (ref 1.7–7)
NEUTROPHILS NFR BLD MANUAL: 61.6 % (ref 42.7–76)
NEUTS BAND NFR BLD MANUAL: 4 % (ref 0–5)
PLATELET # BLD AUTO: 159 10*3/MM3 (ref 140–450)
PMV BLD AUTO: 10.4 FL (ref 6–12)
POTASSIUM SERPL-SCNC: 4.4 MMOL/L (ref 3.5–5.2)
PROT SERPL-MCNC: 6.1 G/DL (ref 6–8.5)
RBC # BLD AUTO: 4.2 10*6/MM3 (ref 3.77–5.28)
RBC MORPH BLD: NORMAL
SODIUM SERPL-SCNC: 138 MMOL/L (ref 136–145)
TOXIC GRANULATION: ABNORMAL
VARIANT LYMPHS NFR BLD MANUAL: 15.2 % (ref 19.6–45.3)
VARIANT LYMPHS NFR BLD MANUAL: 5.1 % (ref 0–5)
WBC NRBC COR # BLD: 6.13 10*3/MM3 (ref 3.4–10.8)

## 2022-07-07 PROCEDURE — 80053 COMPREHEN METABOLIC PANEL: CPT

## 2022-07-07 PROCEDURE — 36415 COLL VENOUS BLD VENIPUNCTURE: CPT

## 2022-07-07 PROCEDURE — 85025 COMPLETE CBC W/AUTO DIFF WBC: CPT

## 2022-07-07 PROCEDURE — 85007 BL SMEAR W/DIFF WBC COUNT: CPT

## 2022-07-07 PROCEDURE — 83735 ASSAY OF MAGNESIUM: CPT

## 2022-07-14 PROCEDURE — 77338 DESIGN MLC DEVICE FOR IMRT: CPT | Performed by: RADIOLOGY

## 2022-07-14 PROCEDURE — 77300 RADIATION THERAPY DOSE PLAN: CPT | Performed by: RADIOLOGY

## 2022-07-14 PROCEDURE — 77301 RADIOTHERAPY DOSE PLAN IMRT: CPT | Performed by: RADIOLOGY

## 2022-07-19 ENCOUNTER — HOSPITAL ENCOUNTER (OUTPATIENT)
Dept: RADIATION ONCOLOGY | Facility: HOSPITAL | Age: 62
Setting detail: RADIATION/ONCOLOGY SERIES
Discharge: HOME | End: 2022-07-19

## 2022-07-19 LAB
RAD ONC ARIA COURSE ID: NORMAL
RAD ONC ARIA COURSE LAST TREATMENT DATE: NORMAL
RAD ONC ARIA COURSE START DATE: NORMAL
RAD ONC ARIA COURSE TREATMENT ELAPSED DAYS: 0
RAD ONC ARIA FIRST TREATMENT DATE: NORMAL
RAD ONC ARIA PLAN FRACTIONS TREATED TO DATE: 1
RAD ONC ARIA PLAN ID: NORMAL
RAD ONC ARIA PLAN NAME: NORMAL
RAD ONC ARIA PLAN PRESCRIBED DOSE PER FRACTION: 1.8 GY
RAD ONC ARIA PLAN PRIMARY REFERENCE POINT: NORMAL
RAD ONC ARIA PLAN TOTAL FRACTIONS PRESCRIBED: 28
RAD ONC ARIA PLAN TOTAL PRESCRIBED DOSE: 5040 CGY
RAD ONC ARIA REFERENCE POINT DOSAGE GIVEN TO DATE: 1.8 GY
RAD ONC ARIA REFERENCE POINT ID: NORMAL
RAD ONC ARIA REFERENCE POINT SESSION DOSAGE GIVEN: 1.8 GY

## 2022-07-19 PROCEDURE — 77386: CPT | Performed by: RADIOLOGY

## 2022-07-20 ENCOUNTER — HOSPITAL ENCOUNTER (OUTPATIENT)
Dept: RADIATION ONCOLOGY | Facility: HOSPITAL | Age: 62
Setting detail: RADIATION/ONCOLOGY SERIES
Discharge: HOME | End: 2022-07-20

## 2022-07-20 LAB
RAD ONC ARIA COURSE ID: NORMAL
RAD ONC ARIA COURSE LAST TREATMENT DATE: NORMAL
RAD ONC ARIA COURSE START DATE: NORMAL
RAD ONC ARIA COURSE TREATMENT ELAPSED DAYS: 1
RAD ONC ARIA FIRST TREATMENT DATE: NORMAL
RAD ONC ARIA PLAN FRACTIONS TREATED TO DATE: 2
RAD ONC ARIA PLAN ID: NORMAL
RAD ONC ARIA PLAN NAME: NORMAL
RAD ONC ARIA PLAN PRESCRIBED DOSE PER FRACTION: 1.8 GY
RAD ONC ARIA PLAN PRIMARY REFERENCE POINT: NORMAL
RAD ONC ARIA PLAN TOTAL FRACTIONS PRESCRIBED: 28
RAD ONC ARIA PLAN TOTAL PRESCRIBED DOSE: 5040 CGY
RAD ONC ARIA REFERENCE POINT DOSAGE GIVEN TO DATE: 3.6 GY
RAD ONC ARIA REFERENCE POINT ID: NORMAL
RAD ONC ARIA REFERENCE POINT SESSION DOSAGE GIVEN: 1.8 GY

## 2022-07-20 PROCEDURE — 77386: CPT | Performed by: RADIOLOGY

## 2022-07-21 ENCOUNTER — HOSPITAL ENCOUNTER (OUTPATIENT)
Dept: RADIATION ONCOLOGY | Facility: HOSPITAL | Age: 62
Setting detail: RADIATION/ONCOLOGY SERIES
Discharge: HOME | End: 2022-07-21

## 2022-07-21 LAB
RAD ONC ARIA COURSE ID: NORMAL
RAD ONC ARIA COURSE LAST TREATMENT DATE: NORMAL
RAD ONC ARIA COURSE START DATE: NORMAL
RAD ONC ARIA COURSE TREATMENT ELAPSED DAYS: 2
RAD ONC ARIA FIRST TREATMENT DATE: NORMAL
RAD ONC ARIA PLAN FRACTIONS TREATED TO DATE: 3
RAD ONC ARIA PLAN ID: NORMAL
RAD ONC ARIA PLAN NAME: NORMAL
RAD ONC ARIA PLAN PRESCRIBED DOSE PER FRACTION: 1.8 GY
RAD ONC ARIA PLAN PRIMARY REFERENCE POINT: NORMAL
RAD ONC ARIA PLAN TOTAL FRACTIONS PRESCRIBED: 28
RAD ONC ARIA PLAN TOTAL PRESCRIBED DOSE: 5040 CGY
RAD ONC ARIA REFERENCE POINT DOSAGE GIVEN TO DATE: 5.4 GY
RAD ONC ARIA REFERENCE POINT ID: NORMAL
RAD ONC ARIA REFERENCE POINT SESSION DOSAGE GIVEN: 1.8 GY

## 2022-07-21 PROCEDURE — 77336 RADIATION PHYSICS CONSULT: CPT | Performed by: RADIOLOGY

## 2022-07-21 PROCEDURE — 77386: CPT | Performed by: RADIOLOGY

## 2022-07-22 ENCOUNTER — HOSPITAL ENCOUNTER (OUTPATIENT)
Dept: RADIATION ONCOLOGY | Facility: HOSPITAL | Age: 62
Discharge: HOME | End: 2022-07-22

## 2022-07-22 LAB
RAD ONC ARIA COURSE ID: NORMAL
RAD ONC ARIA COURSE LAST TREATMENT DATE: NORMAL
RAD ONC ARIA COURSE START DATE: NORMAL
RAD ONC ARIA COURSE TREATMENT ELAPSED DAYS: 3
RAD ONC ARIA FIRST TREATMENT DATE: NORMAL
RAD ONC ARIA PLAN FRACTIONS TREATED TO DATE: 4
RAD ONC ARIA PLAN ID: NORMAL
RAD ONC ARIA PLAN NAME: NORMAL
RAD ONC ARIA PLAN PRESCRIBED DOSE PER FRACTION: 1.8 GY
RAD ONC ARIA PLAN PRIMARY REFERENCE POINT: NORMAL
RAD ONC ARIA PLAN TOTAL FRACTIONS PRESCRIBED: 28
RAD ONC ARIA PLAN TOTAL PRESCRIBED DOSE: 5040 CGY
RAD ONC ARIA REFERENCE POINT DOSAGE GIVEN TO DATE: 7.2 GY
RAD ONC ARIA REFERENCE POINT ID: NORMAL
RAD ONC ARIA REFERENCE POINT SESSION DOSAGE GIVEN: 1.8 GY

## 2022-07-22 PROCEDURE — 77386: CPT | Performed by: RADIOLOGY

## 2022-07-25 ENCOUNTER — HOSPITAL ENCOUNTER (OUTPATIENT)
Dept: RADIATION ONCOLOGY | Facility: HOSPITAL | Age: 62
Discharge: HOME | End: 2022-07-25

## 2022-07-25 LAB
RAD ONC ARIA COURSE ID: NORMAL
RAD ONC ARIA COURSE LAST TREATMENT DATE: NORMAL
RAD ONC ARIA COURSE START DATE: NORMAL
RAD ONC ARIA COURSE TREATMENT ELAPSED DAYS: 6
RAD ONC ARIA FIRST TREATMENT DATE: NORMAL
RAD ONC ARIA PLAN FRACTIONS TREATED TO DATE: 5
RAD ONC ARIA PLAN ID: NORMAL
RAD ONC ARIA PLAN NAME: NORMAL
RAD ONC ARIA PLAN PRESCRIBED DOSE PER FRACTION: 1.8 GY
RAD ONC ARIA PLAN PRIMARY REFERENCE POINT: NORMAL
RAD ONC ARIA PLAN TOTAL FRACTIONS PRESCRIBED: 28
RAD ONC ARIA PLAN TOTAL PRESCRIBED DOSE: 5040 CGY
RAD ONC ARIA REFERENCE POINT DOSAGE GIVEN TO DATE: 9 GY
RAD ONC ARIA REFERENCE POINT ID: NORMAL
RAD ONC ARIA REFERENCE POINT SESSION DOSAGE GIVEN: 1.8 GY

## 2022-07-25 PROCEDURE — 77386: CPT | Performed by: RADIOLOGY

## 2022-07-26 ENCOUNTER — HOSPITAL ENCOUNTER (OUTPATIENT)
Dept: RADIATION ONCOLOGY | Facility: HOSPITAL | Age: 62
Setting detail: RADIATION/ONCOLOGY SERIES
Discharge: HOME | End: 2022-07-26

## 2022-07-26 LAB
RAD ONC ARIA COURSE ID: NORMAL
RAD ONC ARIA COURSE LAST TREATMENT DATE: NORMAL
RAD ONC ARIA COURSE START DATE: NORMAL
RAD ONC ARIA COURSE TREATMENT ELAPSED DAYS: 7
RAD ONC ARIA FIRST TREATMENT DATE: NORMAL
RAD ONC ARIA PLAN FRACTIONS TREATED TO DATE: 6
RAD ONC ARIA PLAN ID: NORMAL
RAD ONC ARIA PLAN NAME: NORMAL
RAD ONC ARIA PLAN PRESCRIBED DOSE PER FRACTION: 1.8 GY
RAD ONC ARIA PLAN PRIMARY REFERENCE POINT: NORMAL
RAD ONC ARIA PLAN TOTAL FRACTIONS PRESCRIBED: 28
RAD ONC ARIA PLAN TOTAL PRESCRIBED DOSE: 5040 CGY
RAD ONC ARIA REFERENCE POINT DOSAGE GIVEN TO DATE: 10.8 GY
RAD ONC ARIA REFERENCE POINT ID: NORMAL
RAD ONC ARIA REFERENCE POINT SESSION DOSAGE GIVEN: 1.8 GY

## 2022-07-26 PROCEDURE — 77386: CPT | Performed by: RADIOLOGY

## 2022-07-27 ENCOUNTER — HOSPITAL ENCOUNTER (OUTPATIENT)
Dept: RADIATION ONCOLOGY | Facility: HOSPITAL | Age: 62
Setting detail: RADIATION/ONCOLOGY SERIES
Discharge: HOME | End: 2022-07-27

## 2022-07-27 LAB
RAD ONC ARIA COURSE ID: NORMAL
RAD ONC ARIA COURSE LAST TREATMENT DATE: NORMAL
RAD ONC ARIA COURSE START DATE: NORMAL
RAD ONC ARIA COURSE TREATMENT ELAPSED DAYS: 8
RAD ONC ARIA FIRST TREATMENT DATE: NORMAL
RAD ONC ARIA PLAN FRACTIONS TREATED TO DATE: 7
RAD ONC ARIA PLAN ID: NORMAL
RAD ONC ARIA PLAN NAME: NORMAL
RAD ONC ARIA PLAN PRESCRIBED DOSE PER FRACTION: 1.8 GY
RAD ONC ARIA PLAN PRIMARY REFERENCE POINT: NORMAL
RAD ONC ARIA PLAN TOTAL FRACTIONS PRESCRIBED: 28
RAD ONC ARIA PLAN TOTAL PRESCRIBED DOSE: 5040 CGY
RAD ONC ARIA REFERENCE POINT DOSAGE GIVEN TO DATE: 12.6 GY
RAD ONC ARIA REFERENCE POINT ID: NORMAL
RAD ONC ARIA REFERENCE POINT SESSION DOSAGE GIVEN: 1.8 GY

## 2022-07-27 PROCEDURE — 77386: CPT | Performed by: RADIOLOGY

## 2022-07-28 ENCOUNTER — HOSPITAL ENCOUNTER (OUTPATIENT)
Dept: RADIATION ONCOLOGY | Facility: HOSPITAL | Age: 62
Setting detail: RADIATION/ONCOLOGY SERIES
Discharge: HOME | End: 2022-07-28

## 2022-07-28 LAB
RAD ONC ARIA COURSE ID: NORMAL
RAD ONC ARIA COURSE LAST TREATMENT DATE: NORMAL
RAD ONC ARIA COURSE START DATE: NORMAL
RAD ONC ARIA COURSE TREATMENT ELAPSED DAYS: 9
RAD ONC ARIA FIRST TREATMENT DATE: NORMAL
RAD ONC ARIA PLAN FRACTIONS TREATED TO DATE: 8
RAD ONC ARIA PLAN ID: NORMAL
RAD ONC ARIA PLAN NAME: NORMAL
RAD ONC ARIA PLAN PRESCRIBED DOSE PER FRACTION: 1.8 GY
RAD ONC ARIA PLAN PRIMARY REFERENCE POINT: NORMAL
RAD ONC ARIA PLAN TOTAL FRACTIONS PRESCRIBED: 28
RAD ONC ARIA PLAN TOTAL PRESCRIBED DOSE: 5040 CGY
RAD ONC ARIA REFERENCE POINT DOSAGE GIVEN TO DATE: 14.4 GY
RAD ONC ARIA REFERENCE POINT ID: NORMAL
RAD ONC ARIA REFERENCE POINT SESSION DOSAGE GIVEN: 1.8 GY

## 2022-07-28 PROCEDURE — 77336 RADIATION PHYSICS CONSULT: CPT | Performed by: RADIOLOGY

## 2022-07-28 PROCEDURE — 77386: CPT | Performed by: RADIOLOGY

## 2022-07-29 ENCOUNTER — HOSPITAL ENCOUNTER (OUTPATIENT)
Dept: RADIATION ONCOLOGY | Facility: HOSPITAL | Age: 62
Setting detail: RADIATION/ONCOLOGY SERIES
Discharge: HOME | End: 2022-07-29

## 2022-07-29 LAB
RAD ONC ARIA COURSE ID: NORMAL
RAD ONC ARIA COURSE LAST TREATMENT DATE: NORMAL
RAD ONC ARIA COURSE START DATE: NORMAL
RAD ONC ARIA COURSE TREATMENT ELAPSED DAYS: 10
RAD ONC ARIA FIRST TREATMENT DATE: NORMAL
RAD ONC ARIA PLAN FRACTIONS TREATED TO DATE: 9
RAD ONC ARIA PLAN ID: NORMAL
RAD ONC ARIA PLAN NAME: NORMAL
RAD ONC ARIA PLAN PRESCRIBED DOSE PER FRACTION: 1.8 GY
RAD ONC ARIA PLAN PRIMARY REFERENCE POINT: NORMAL
RAD ONC ARIA PLAN TOTAL FRACTIONS PRESCRIBED: 28
RAD ONC ARIA PLAN TOTAL PRESCRIBED DOSE: 5040 CGY
RAD ONC ARIA REFERENCE POINT DOSAGE GIVEN TO DATE: 16.2 GY
RAD ONC ARIA REFERENCE POINT ID: NORMAL
RAD ONC ARIA REFERENCE POINT SESSION DOSAGE GIVEN: 1.8 GY

## 2022-07-29 PROCEDURE — 77386: CPT | Performed by: RADIOLOGY

## 2022-08-01 ENCOUNTER — HOSPITAL ENCOUNTER (OUTPATIENT)
Dept: RADIATION ONCOLOGY | Facility: HOSPITAL | Age: 62
Setting detail: RADIATION/ONCOLOGY SERIES
Discharge: HOME | End: 2022-08-01

## 2022-08-01 ENCOUNTER — HOSPITAL ENCOUNTER (OUTPATIENT)
Dept: RADIATION ONCOLOGY | Facility: HOSPITAL | Age: 62
Setting detail: RADIATION/ONCOLOGY SERIES
End: 2022-08-01

## 2022-08-01 LAB
RAD ONC ARIA COURSE ID: NORMAL
RAD ONC ARIA COURSE LAST TREATMENT DATE: NORMAL
RAD ONC ARIA COURSE START DATE: NORMAL
RAD ONC ARIA COURSE TREATMENT ELAPSED DAYS: 13
RAD ONC ARIA FIRST TREATMENT DATE: NORMAL
RAD ONC ARIA PLAN FRACTIONS TREATED TO DATE: 10
RAD ONC ARIA PLAN ID: NORMAL
RAD ONC ARIA PLAN NAME: NORMAL
RAD ONC ARIA PLAN PRESCRIBED DOSE PER FRACTION: 1.8 GY
RAD ONC ARIA PLAN PRIMARY REFERENCE POINT: NORMAL
RAD ONC ARIA PLAN TOTAL FRACTIONS PRESCRIBED: 28
RAD ONC ARIA PLAN TOTAL PRESCRIBED DOSE: 5040 CGY
RAD ONC ARIA REFERENCE POINT DOSAGE GIVEN TO DATE: 18 GY
RAD ONC ARIA REFERENCE POINT ID: NORMAL
RAD ONC ARIA REFERENCE POINT SESSION DOSAGE GIVEN: 1.8 GY

## 2022-08-01 PROCEDURE — 77386: CPT | Performed by: RADIOLOGY

## 2022-08-02 ENCOUNTER — HOSPITAL ENCOUNTER (OUTPATIENT)
Dept: RADIATION ONCOLOGY | Facility: HOSPITAL | Age: 62
Setting detail: RADIATION/ONCOLOGY SERIES
Discharge: HOME | End: 2022-08-02

## 2022-08-02 LAB
RAD ONC ARIA COURSE ID: NORMAL
RAD ONC ARIA COURSE LAST TREATMENT DATE: NORMAL
RAD ONC ARIA COURSE START DATE: NORMAL
RAD ONC ARIA COURSE TREATMENT ELAPSED DAYS: 14
RAD ONC ARIA FIRST TREATMENT DATE: NORMAL
RAD ONC ARIA PLAN FRACTIONS TREATED TO DATE: 11
RAD ONC ARIA PLAN ID: NORMAL
RAD ONC ARIA PLAN NAME: NORMAL
RAD ONC ARIA PLAN PRESCRIBED DOSE PER FRACTION: 1.8 GY
RAD ONC ARIA PLAN PRIMARY REFERENCE POINT: NORMAL
RAD ONC ARIA PLAN TOTAL FRACTIONS PRESCRIBED: 28
RAD ONC ARIA PLAN TOTAL PRESCRIBED DOSE: 5040 CGY
RAD ONC ARIA REFERENCE POINT DOSAGE GIVEN TO DATE: 19.8 GY
RAD ONC ARIA REFERENCE POINT ID: NORMAL
RAD ONC ARIA REFERENCE POINT SESSION DOSAGE GIVEN: 1.8 GY

## 2022-08-02 PROCEDURE — 77386: CPT | Performed by: RADIOLOGY

## 2022-08-03 ENCOUNTER — HOSPITAL ENCOUNTER (OUTPATIENT)
Dept: RADIATION ONCOLOGY | Facility: HOSPITAL | Age: 62
Setting detail: RADIATION/ONCOLOGY SERIES
Discharge: HOME | End: 2022-08-03

## 2022-08-03 LAB
RAD ONC ARIA COURSE ID: NORMAL
RAD ONC ARIA COURSE LAST TREATMENT DATE: NORMAL
RAD ONC ARIA COURSE START DATE: NORMAL
RAD ONC ARIA COURSE TREATMENT ELAPSED DAYS: 15
RAD ONC ARIA FIRST TREATMENT DATE: NORMAL
RAD ONC ARIA PLAN FRACTIONS TREATED TO DATE: 12
RAD ONC ARIA PLAN ID: NORMAL
RAD ONC ARIA PLAN NAME: NORMAL
RAD ONC ARIA PLAN PRESCRIBED DOSE PER FRACTION: 1.8 GY
RAD ONC ARIA PLAN PRIMARY REFERENCE POINT: NORMAL
RAD ONC ARIA PLAN TOTAL FRACTIONS PRESCRIBED: 28
RAD ONC ARIA PLAN TOTAL PRESCRIBED DOSE: 5040 CGY
RAD ONC ARIA REFERENCE POINT DOSAGE GIVEN TO DATE: 21.6 GY
RAD ONC ARIA REFERENCE POINT ID: NORMAL
RAD ONC ARIA REFERENCE POINT SESSION DOSAGE GIVEN: 1.8 GY

## 2022-08-03 PROCEDURE — 77386: CPT | Performed by: RADIOLOGY

## 2022-08-03 NOTE — PROGRESS NOTES
"[facility] HEMATOLOGY & ONCOLOGY  [address]  [facility] [address] [phone]    Patient Name: Rachel Sierra  Encounter Date: 08/10/2022  YOB: 1960  Patient Number: [id number]       REASON FOR VISIT: Rachel Sierra is a 61-year-old female who returns in follow-up of stage FIGO IA endometrial carcinoma.  She underwent robotic assisted total laparoscopic hysterectomy, bilateral salpingo-oophorectomy, sentinel lymph node mapping, 04/07/2022.  She is receiving adjuvant carboplatin+ paclitaxel, cycle 3 on 06/30/2022 and radiation in \"sandwich\" fashion beginning 07/19/2022 (17/28 fx). She is here alone (previously with her spouse, Pablo).    I have reviewed the HPI and verified with the patient the accuracy of it. No changes to interval history since the information was documented. Js Lima MD 08/10/22     Diagnostic abnormalities:  --02/24/2022- transvaginal ultrasound--uterus 10 cm length, 3.9 cm fundal fibroid.  Endometrial thickness 27 mm, ovaries normal.  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  --03/07/2022- BRCA 1/2: Negative-no clinically significant variants identified  --03/17/2022-mammogram-persistent nodular densities within the right retroareolar and left medial breast corresponding with benign cysts versus ductal ectasia in the medial left breast.  Finding consistent with benign process.  Return to annual screening mammography recommended, March 2023.  BI-RADS 2 benign exam.  --04/06/2022- BMP normal.  CBC normal.  --04/27/2022 --consult by Dr. Miller.  Recommends treatment to the pelvis with curative radiation therapy, anticipate 4500 cGy over 25-28 fractions, final course pending completion of planning.  In addition recommend 3 HDR brachytherapy fractions, final course pending.  --05/05/2022-- CT chest-no acute " "abnormality no evidence of neoplastic disease within the chest.  -- 05/05/2022-CT abdomen/pelvis--3-4 cm soft tissue \"mass\" at the left pelvic sidewall for which neoplastic lymphadenopathy is favored given the history of endometrial carcinoma. Mildly dilated right ureter extending into the upper pelvis where there appears to be a focus of scarring. There is no high-grade ureteral obstruction. Addendum: This examination is reviewed with and discussed with Dr. Miller. The previously described 3-4 cm soft tissue mass along the left pelvic  sidewall is seen to have fluid density. The low-density and discrete margins support the idea that this represents a lymphocele rather than neoplastic lymphadenopathy. Ultrasound evaluation is planned to confirm this. If ultrasound is not helpful pre and postcontrast pelvic MRI should be  Considered.  -- 05/17/2022- ultrasound pelvis-Reidentified LEFT iliac chain collection/lesion measuring 3.0 x 3.7 x 2.7 cm. There are some low-level internal echoes within this lesion. No definite internal vascularity. Favor this to represent a postoperative lymphocele, especially given recent niecy dissection in this region. However this area is incompletely characterized by ultrasound and continued follow-up is recommended to confirm stability or resolution. Alternatively, MRI pelvis would provide better characterization.    Previous interventions:  --04/07/2022- robotic assisted total laparoscopic hysterectomy, bilateral salpingo-oophorectomy, sentinel lymph node mapping by Dr. Jay.  --Final diagnosis:1. Uterus, Cervix, Bilateral Fallopian Tubes and Ovaries, Hysterectomy with Bilateral Salpingo-oophorectomy         (236 grams): High-grade endometrial carcinoma.               A. Endometrium:                           1. High-grade serous carcinoma.                          2. The tumor measures 4.2 x 3.5 x 1.5 cm.                          3. The tumor extends into the myometrium a total of " "6.25 mm out of a total thickness of 13 mm       (48%)  4. No definitive capillary lymphatic invasion is identified.   5. No definitive perineural invasion is seen.  6. Adenomyosis is identified with no definitive tumor involving the adenomyosis.  7. Parametrium not involved.              8. The tumor extends into the lower uterine segment but does not invade the stroma.  B. Benign cervix with               1. Nabothian cysts.  C. Benign parametria.  D. Benign right fallopian tube.  E. Benign right ovary.  F. Benign left fallopian tube.  G. Benign ovary.              H. Benign serosa.   2. Lymph Nodes, Right Obturator [place] Lymph Node, Excision:                A. Three benign lymph nodes.   3. Lymph Nodes, Left Obturator [place] Lymph Node, Excision:               A. Three benign lymph nodes.     -- Adjuvant carboplatin+ paclitaxel:  C1, 05/19/2022; C2, 06/09/2022; C3, 06/30/2022  -- \"sandwich\" treatment to the pelvis with curative radiation therapy, beginning 07/19/2022 -anticipate 4500 cGy over 25-28 fractions, final course pending completion of planning.  In addition recommend 3 HDR brachytherapy fractions, final course pending.      LABS    Lab Results - Last 18 Months   Lab Units 08/10/22  0754 07/07/22  0827 06/30/22  0733 06/23/22  1418 06/16/22  1423 06/06/22  0846 06/02/22  1424 05/26/22  1407 05/16/22  0820 05/05/22  1337 04/27/22  1006   HEMOGLOBIN g/dL 13.6 12.2 12.8 12.7 13.1 13.7 13.0 12.7 14.0 14.0 14.5   HEMATOCRIT % 41.7 37.4 38.7 38.2 40.5 41.9 39.5 39.3 44.5 43.8 43.8   MCV fL 90.8 89.0 86.8 86.4 88.0 85.3 84.9 86.2 85.7 86.1 84.6   WBC 10*3/mm3 4.00 6.13 8.66 8.38 13.26* 4.99 6.45 12.78* 5.60 5.66 7.37   RDW % 15.8* 17.6* 18.1* 16.9* 16.3* 15.5* 15.2 14.6 13.9 13.5 13.2   MPV fL 9.3 10.4 9.4 10.1 10.8 10.4 9.9 10.6 10.4 9.9 9.3   PLATELETS 10*3/mm3 177 159 292 76* 206 258 123* 168 210 393 494*   IMM GRAN % % 0.3  --  0.6*  --   --  1.0*  --   --  0.2 0.4 0.5   NEUTROS ABS 10*3/mm3 2.86 4.02 " 7.09* 5.62 7.62* 2.94 4.16 7.00 2.99 3.18 4.79   LYMPHS ABS 10*3/mm3 0.44*  --  1.11 1.89  --  1.50 1.57  --  1.65 1.68 1.59   MONOS ABS 10*3/mm3 0.39  --  0.39 0.44  --  0.39 0.46  --  0.48 0.41 0.45   EOS ABS 10*3/mm3 0.26 0.12 0.00 0.11 0.15 0.06 0.11 0.41* 0.41* 0.28 0.43*   BASOS ABS 10*3/mm3 0.04 0.06 0.02 0.08  --  0.05 0.04  --  0.06 0.09 0.07   IMMATURE GRANS (ABS) 10*3/mm3 0.01  --  0.05  --   --  0.05  --   --  0.01 0.02 0.04   NRBC /100 WBC 0.0  --  0.0  --   --  0.0  --   --  0.0 0.0 0.0   NEUTROPHIL % %  --  61.6  --   --  45.7  --   --  42.1*  --   --   --    MONOCYTES % %  --  11.1  --   --  16.0*  --   --  13.7*  --   --   --    BASOPHIL % %  --  1.0  --   --   --   --   --   --   --   --   --    ATYP LYMPH % %  --  5.1*  --   --  2.1  --   --  6.3*  --   --   --        Lab Results - Last 18 Months   Lab Units 08/10/22  0754 07/07/22  0827 06/30/22  0733 06/23/22  1418 06/16/22  1423 06/06/22  0846   GLUCOSE mg/dL 107* 116* 138* 94 89 105*   SODIUM mmol/L 139 138 139 140 139 138   POTASSIUM mmol/L 4.2 4.4 3.9 4.8 4.4 4.2   CO2 mmol/L 29.0 29.0 25.0 28.0 28.0 26.0   CHLORIDE mmol/L 102 101 103 104 102 106   ANION GAP mmol/L 8.0 8.0 11.0 8.0 9.0 6.0   CREATININE mg/dL 0.65 0.65 0.74 0.69 0.61 0.55*   BUN mg/dL 14 15 18 14 12 10   BUN / CREAT RATIO  21.5 23.1 24.3 20.3 19.7 18.2   CALCIUM mg/dL 9.6 10.1 9.8 9.4 9.8 9.4   ALK PHOS U/L 79 119* 89 115 152* 96   TOTAL PROTEIN g/dL 6.7 6.1 6.8 6.4 6.7 6.8   ALT (SGPT) U/L 23 29 27 34* 26 26   AST (SGOT) U/L 20 21 18 27 27 20   BILIRUBIN mg/dL 0.2 0.3 0.4 0.2 0.2 0.4   ALBUMIN g/dL 4.20 3.90 4.20 4.00 4.20 4.30   GLOBULIN gm/dL 2.5 2.2 2.6 2.4 2.5 2.5       No results for input(s): MSPIKE, KAPPALAMB, IGLFLC, URICACID, FREEKAPPAL, CEA, LDH, REFLABREPO in the last 66305 hours.    No results for input(s): IRON, TIBC, LABIRON, FERRITIN, B0SCQNS, TSH, FOLATE in the last 69056 hours.    Invalid input(s): VITB12      PAST MEDICAL HISTORY:  ALLERGIES:  Allergies    Allergen Reactions   • Honey Anaphylaxis     Anaphylaxis as a child - edema at lips as adult.   • Sulfa Antibiotics Rash and Swelling     Edema throat, hands and face -  Hospitalized for 2 days.   • Bupropion Hives   • Nuts Other (See Comments)     Blood in stools     CURRENT MEDICATIONS:  Outpatient Encounter Medications as of 8/10/2022   Medication Sig Dispense Refill   • dexamethasone (DECADRON) 4 MG tablet Start premedication 2 days prior to planned treatment, take one tablet by mouth twice daily x 2 days before txt 40 tablet 0   • diphenhydrAMINE (BENADRYL) 25 mg capsule Take 25 mg by mouth Every 6 (Six) Hours As Needed for Itching or Allergies.     • diphenoxylate-atropine (LOMOTIL) 2.5-0.025 MG per tablet Take 2 tablets po with first loose bowel movement and then 1 tablet po with each loose stool with max of 6 tablets per day 60 tablet 1   • famotidine (PEPCID) 40 MG tablet Take one tablet by mouth once daily 30 tablet 2   • IBUPROFEN PO Take  by mouth As Needed.     • lidocaine-prilocaine (EMLA) 2.5-2.5 % cream 30 minutes prior to access apply generous amount to port site area and cover with clear plastic wrap until ready to access 1 each 2   • ondansetron (ZOFRAN) 8 MG tablet Take 1 tablet by mouth Every 8 (Eight) Hours As Needed for Nausea or Vomiting. 30 tablet 0     No facility-administered encounter medications on file as of 8/10/2022.     Adult illnesses:  Endometrial carcinoma  Seasonal allergies    Past surgeries:  Left breast biopsy-lipoma  Cholecystectomy  Endometrial biopsy, 2015  Multiple lipoma excisions  Tonsillectomy  Endometrial biopsy, 02/24/2022  Robotic assisted total laparoscopic hysterectomy/bilateral salpingo-oophorectomy/sentinel lymph node mapping, vaginal laceration repair, 04/17/2022-Dr. Jay  Left subclavian Mediport, single-lumen, 05/09/2022- Dr. De La Cruz      ADULT ILLNESSES:  Patient Active Problem List   Diagnosis Code   • Uterine cancer (HCC) C55   • BMI 36.0-36.9,adult  Z68.36   • Endometrial carcinoma (HCC) C54.1   • Fitting and adjustment of vascular catheter Z45.2   • Non-smoker Z78.9   • S/P KIMMY-BSO (total abdominal hysterectomy and bilateral salpingo-oophorectomy) Z90.710, Z90.722, Z90.79   • Diarrhea R19.7   • Nausea R11.0     SURGERIES:  Past Surgical History:   Procedure Laterality Date   • BREAST BIOPSY Left     lipoma   • CHOLECYSTECTOMY     • ENDOMETRIAL BIOPSY  2015   • LIPOMA EXCISION      MULTIPLE   • TONSILLECTOMY     • TOTAL LAPAROSCOPIC HYSTERECTOMY N/A 4/7/2022    Procedure: Robotic assisted total laparoscopic hysterectomy, bilateral salpingoophorecotmy, sentinel lymph node mapping.;  Surgeon: Adela Jay DO;  Location: [facility] OR;  Service: Robotics - [facility];  Laterality: N/A;   • VENOUS ACCESS DEVICE (PORT) INSERTION N/A 5/9/2022    Procedure: SINGLE LUMEN PORT PLACEMENT;  Surgeon: Flora De La Cruz MD;  Location: [facility] OR;  Service: General;  Laterality: N/A;     HEALTH MAINTENANCE ITEMS:  Health Maintenance Due   Topic Date Due   • COLORECTAL CANCER SCREENING  Never done   • ANNUAL PHYSICAL  Never done   • ZOSTER VACCINE (1 of 2) Never done   • COVID-19 Vaccine (3 - Moderna risk series) 12/29/2021       <no information>  Last Completed Colonoscopy     This patient has no relevant Health Maintenance data.        Immunization History   Administered Date(s) Administered   • COVID-19 (MODERNA) 1st, 2nd, 3rd Dose Only 03/23/2021, 04/20/2021, 12/01/2021   • COVID-19 (MODERNA) BOOSTER 12/01/2021   • Flu Vaccine Split Quad 01/05/2009   • FluLaval/Fluarix/Fluzone >6 12/01/2021   • Influenza, Unspecified 12/01/2021   • TD Preservative Free 01/04/2008   • Tdap 08/16/2017     Last Completed Mammogram          MAMMOGRAM (Every 2 Years) Next due on 3/17/2024    03/17/2022  Mammo Diagnostic Digital Tomosynthesis Bilateral With CAD    03/01/2022  Mammo Screening Digital Tomosynthesis Bilateral With CAD    08/23/2017  Done                  FAMILY HISTORY:  Family  "History   Problem Relation Age of Onset   • Lung cancer Mother    • Anemia Father    • Breast cancer Father    • Melanoma Sister    • Ovarian cancer Neg Hx    • Colon cancer Neg Hx    • Malig Hyperthermia Neg Hx      SOCIAL HISTORY:  Social History     Socioeconomic History   • Marital status:    Tobacco Use   • Smoking status: Never Smoker   • Smokeless tobacco: Never Used   Vaping Use   • Vaping Use: Never used   Substance and Sexual Activity   • Alcohol use: Yes     Alcohol/week: 1.0 standard drink     Types: 1 Cans of beer per week     Comment: occ.   • Drug use: Never   • Sexual activity: Yes     Partners: Male     Birth control/protection: None       REVIEW OF SYSTEMS:  Review of Systems   Constitutional: Negative for chills, diaphoresis (after surgery for ~ 2 weeks, \"gone.\"), fatigue (\"after surgery but better\") and fever.        Manages her ADLs, to include some chores, errands and driving.  Is up and about, \"most of the time.\" Is not walking as regularly.    Chemotherapy tolerance: No mouth sores.  No nausea.  No vomiting.  No skin changes.  No neuropathy.  Loose stools without overt diarrhea.  Says uses 2-3 doses of Lomotil/day   HENT: Positive for postnasal drip.    Eyes: Negative.    Respiratory: Negative.    Cardiovascular: Negative.    Gastrointestinal: Positive for diarrhea (From RT.  Has needed ~ 2-3 doses/day of Lomotil).   Endocrine: Negative.  Negative for heat intolerance.   Genitourinary: Positive for frequency (From RT). Negative for dysuria, hematuria, pelvic pain, urinary incontinence and vaginal bleeding.        \"Vaginal spotting\" post-op resolved   Musculoskeletal: Negative.    Skin: Negative.    Allergic/Immunologic: Positive for environmental allergies (\"Hayfever\").        Allergic to sulfa   Neurological: Negative.    Hematological: Negative.    Psychiatric/Behavioral: Negative.          /92   Pulse 75   Temp 98.3 °F (36.8 °C)   Resp 18   Ht 165.1 cm (65\")   Wt 97 kg " (213 lb 14.4 oz)   SpO2 97%   Breastfeeding No   BMI 35.59 kg/m²  Body surface area is 2.04 meters squared.  Pain Score    08/10/22 0830   PainSc:   2       Physical Exam:  Physical Exam  Vitals reviewed.   Constitutional:       Appearance: She is obese.      Comments: Pleasant, cooperative, obese, modestly kept female.  Ambulatory.  ECOG 0.      She has regained 2 pounds (had lost 9 pounds at her prior visit) since her last visit    HENT:      Head: Normocephalic and atraumatic.      Mouth/Throat:      Comments: Is wearing a surgical mask today  Eyes:      General: No scleral icterus.     Extraocular Movements: Extraocular movements intact.      Pupils: Pupils are equal, round, and reactive to light.   Cardiovascular:      Rate and Rhythm: Normal rate.   Pulmonary:      Effort: Pulmonary effort is normal.      Comments: Port in the left upper chest is well-seated  Abdominal:      Palpations: Abdomen is soft.      Tenderness: There is no abdominal tenderness.      Comments: Laparoscopy incisions are all well approximated and healing nicely.   Musculoskeletal:         General: Normal range of motion.      Cervical back: Normal range of motion.   Lymphadenopathy:      Cervical: No cervical adenopathy.   Skin:     General: Skin is warm.   Neurological:      General: No focal deficit present.      Mental Status: She is alert and oriented to person, place, and time.   Psychiatric:         Mood and Affect: Mood normal.         Behavior: Behavior normal.         Thought Content: Thought content normal.           Assessment:  1.    Serous carcinoma of the endometrium  · Stage: FIGO IA (pT1a, pN0(sn), pM0).    · Tumor [place]: The tumor measures 4.2 x 3.5 x 1.5 cm. Extends into the myometrium a total of 6.25 mm out of a total thickness of 13 mm (48%).  Extends into the lower uterine segment but does not invade the stroma. 6 regional lymph nodes negative for tumor cells   · Complications of Tumor: Postmenopausal  bleeding  · Mismatch repair (MMR) proteins: Pending  · p16: strong (+)  · Pax8:  strong (+)  · Her2: 1+  · ER/[place]:(-)/(+) patchy  · Tumor Status:-- 04/07/2022- robotic assisted total laparoscopic hysterectomy, bilateral salpingo-oophorectomy, sentinel lymph node mapping by Dr. Jay.  · : 13.1, 06/02/2022 (prior: 37.1)  · Adjuvant carboplatin and Taxol in progress   · Adjuvant radiation (EBRT + brachytherapy) in progress     2.    Thrombocytopenia.  Chemo effect  --177,000, 08/09/2022 (prior range: 76,000-494,000)     Plan:  1.  Apprised of labs, 07/07/2022 and 08/10/2022 with normal CMP (resolution of elevated alk phos/ALT/AST), normal  (13.1-prior: 37.1), normal CBC.  Chemo tolerance discussed.  Mild fatigue and bouts of watery/loose stool well controlled by 2-3 doses of Lomotil daily.  No other overt toxicities.    2.  Previously apprised of CT, 05/02/2022 (above) and pelvic ultrasound, 05/17/2022 (above).  No metastatic disease to the chest.  Left iliac chain collection/lesion.  Favor postoperative lymphocele especially given recent niecy dissection.  Continued follow-up recommended vs MRI.  3.  Review NCCN guidelines version 1.2022 endometrial carcinoma-biopsy findings: Serous carcinoma following primary treatment with KIMMY/BSO, surgical staging and maximal tumor debulking.  For invasive stage IA-additional treatment: Systemic therapy (preferred regimens: Carboplatin/paclitaxel- primary adjuvant treatment with use for uterine confined high risk disease) +/-EBRT+/-vaginal brachytherapy-followed by surveillance: Physical exam every 3-6 months for 2-3 years, then every 6 months for up to 5 years then annually.  CA-125 if initially elevated.  Imaging as clinically indicated (abdominal/pelvic and/or chest CT recommended based upon symptoms or physical exam findings).  4.  Toxicities of chemotherapy previously noted. CARBOPLATIN (to include, but not limited to: Myelosuppression thrombocytopenia, anemia,  leukopenia, and neutropenia, nausea, vomiting, electrolyte abnormalities, liver enzymes abnormalities, nephrotoxicity, O2 toxicity, neuropathy, pain, asthenia, paresthesias, abdominal pain, diarrhea, constipation, mucositis, bleeding, alopecia, vision loss, anaphylactoid reactions) and TAXOL (to include, but not limited to: Myelosuppression (neutropenia, anemia, thrombocytopenia), alopecia, neuropathy, arthralgia, myalgia, nausea, vomiting, hypersensitivity reactions, mucositis, diarrhea, infection, abnormal liver enzymes, asthenia, fever, hypertension, bleeding, edema, opportunistic infections, anaphylaxis, cardiac conduction disturbance/arrhythmias, syncope, thromboembolism, myocardial infarction, severe injection site reactions, pulmonary toxicity, GI obstruction/perforation, paralytic ileus, ischemic colitis, pancreatitis, hepatotoxicity, severe skin reactions). Questions answered to the patient's apparent satisfaction. She agrees to press on with therapy.     5. Schedule (Plan: Every 21 days x6 cycles) - confirm RT has been completed - C4,  08/29/2022; C5, 09/19/2022; C6, 10/10/2022 at [facility].      · Carboplatin AUC of 6 (Total Dose = pending) every 3 weeks   · Taxol 175 mg/m2 (BSA = ; Total Dose =  mg ) every 3 weeks     6.  Premeds:   · Aloxi 0.25 mg IV   · Emend 150 mg IV   · Decadron 10 mg IV   · Pepcid 20 mg IV   · Benadryl 25 mg IV     7.   Neulasta 6 mg sc on day 2 of chemo at [facility].   8.   CMP, magnesium level, and CBC with differential weekly and on day of chemotherapy. Procrit 40,000 units subcutaneously if hemoglobin less than 10 and hematocrit less than 30. Zarxio/Neupogen 480 mcg daily x3 if ANC less than 1.0.   9.   Rx:  Zofran 8 mg po tid prn # 30    10.  Continue other currently identified medications.   11.  Review consult, 04/27/2022 by Dr. Miller.  Recommends treatment to the pelvis with curative radiation therapy, anticipate 4500 cGy over 25-28 fractions, final course pending completion of  planning.  In addition recommend 3 HDR brachytherapy fractions, final course pending.  12.  Return to the office in 6 weeks with pre-office CBC with differential, CMP, .      I spent ~ 43 minutes caring for Rachel on this date of service. This time includes time spent by me in the following activities: preparing for the visit, reviewing tests, performing a medically appropriate examination and/or evaluation, counseling and educating the patient/family/caregiver, ordering medications, tests, or procedures and documenting information in the medical record

## 2022-08-04 ENCOUNTER — HOSPITAL ENCOUNTER (OUTPATIENT)
Dept: RADIATION ONCOLOGY | Facility: HOSPITAL | Age: 62
Setting detail: RADIATION/ONCOLOGY SERIES
Discharge: HOME | End: 2022-08-04

## 2022-08-04 LAB
RAD ONC ARIA COURSE ID: NORMAL
RAD ONC ARIA COURSE LAST TREATMENT DATE: NORMAL
RAD ONC ARIA COURSE START DATE: NORMAL
RAD ONC ARIA COURSE TREATMENT ELAPSED DAYS: 16
RAD ONC ARIA FIRST TREATMENT DATE: NORMAL
RAD ONC ARIA PLAN FRACTIONS TREATED TO DATE: 13
RAD ONC ARIA PLAN ID: NORMAL
RAD ONC ARIA PLAN NAME: NORMAL
RAD ONC ARIA PLAN PRESCRIBED DOSE PER FRACTION: 1.8 GY
RAD ONC ARIA PLAN PRIMARY REFERENCE POINT: NORMAL
RAD ONC ARIA PLAN TOTAL FRACTIONS PRESCRIBED: 28
RAD ONC ARIA PLAN TOTAL PRESCRIBED DOSE: 5040 CGY
RAD ONC ARIA REFERENCE POINT DOSAGE GIVEN TO DATE: 23.4 GY
RAD ONC ARIA REFERENCE POINT ID: NORMAL
RAD ONC ARIA REFERENCE POINT SESSION DOSAGE GIVEN: 1.8 GY

## 2022-08-04 PROCEDURE — 77336 RADIATION PHYSICS CONSULT: CPT | Performed by: RADIOLOGY

## 2022-08-04 PROCEDURE — 77386: CPT | Performed by: RADIOLOGY

## 2022-08-05 ENCOUNTER — HOSPITAL ENCOUNTER (OUTPATIENT)
Dept: RADIATION ONCOLOGY | Facility: HOSPITAL | Age: 62
Setting detail: RADIATION/ONCOLOGY SERIES
Discharge: HOME | End: 2022-08-05

## 2022-08-05 LAB
RAD ONC ARIA COURSE ID: NORMAL
RAD ONC ARIA COURSE LAST TREATMENT DATE: NORMAL
RAD ONC ARIA COURSE START DATE: NORMAL
RAD ONC ARIA COURSE TREATMENT ELAPSED DAYS: 17
RAD ONC ARIA FIRST TREATMENT DATE: NORMAL
RAD ONC ARIA PLAN FRACTIONS TREATED TO DATE: 14
RAD ONC ARIA PLAN ID: NORMAL
RAD ONC ARIA PLAN NAME: NORMAL
RAD ONC ARIA PLAN PRESCRIBED DOSE PER FRACTION: 1.8 GY
RAD ONC ARIA PLAN PRIMARY REFERENCE POINT: NORMAL
RAD ONC ARIA PLAN TOTAL FRACTIONS PRESCRIBED: 28
RAD ONC ARIA PLAN TOTAL PRESCRIBED DOSE: 5040 CGY
RAD ONC ARIA REFERENCE POINT DOSAGE GIVEN TO DATE: 25.2 GY
RAD ONC ARIA REFERENCE POINT ID: NORMAL
RAD ONC ARIA REFERENCE POINT SESSION DOSAGE GIVEN: 1.8 GY

## 2022-08-05 PROCEDURE — 77386: CPT | Performed by: RADIOLOGY

## 2022-08-08 ENCOUNTER — HOSPITAL ENCOUNTER (OUTPATIENT)
Dept: RADIATION ONCOLOGY | Facility: HOSPITAL | Age: 62
Setting detail: RADIATION/ONCOLOGY SERIES
Discharge: HOME | End: 2022-08-08

## 2022-08-08 PROCEDURE — 77332 RADIATION TREATMENT AID(S): CPT | Performed by: RADIOLOGY

## 2022-08-08 PROCEDURE — 57156 INS VAG BRACHYTX DEVICE: CPT | Performed by: RADIOLOGY

## 2022-08-08 PROCEDURE — 77290 THER RAD SIMULAJ FIELD CPLX: CPT | Performed by: RADIOLOGY

## 2022-08-08 PROCEDURE — 77316 BRACHYTX ISODOSE PLAN SIMPLE: CPT | Performed by: RADIOLOGY

## 2022-08-08 PROCEDURE — 77770 HDR RDNCL NTRSTL/ICAV BRCHTX: CPT | Performed by: RADIOLOGY

## 2022-08-08 PROCEDURE — C1717 BRACHYTX, NON-STR,HDR IR-192: HCPCS | Performed by: RADIOLOGY

## 2022-08-09 ENCOUNTER — HOSPITAL ENCOUNTER (OUTPATIENT)
Dept: RADIATION ONCOLOGY | Facility: HOSPITAL | Age: 62
Setting detail: RADIATION/ONCOLOGY SERIES
Discharge: HOME | End: 2022-08-09

## 2022-08-09 LAB
RAD ONC ARIA COURSE ID: NORMAL
RAD ONC ARIA COURSE LAST TREATMENT DATE: NORMAL
RAD ONC ARIA COURSE START DATE: NORMAL
RAD ONC ARIA COURSE TREATMENT ELAPSED DAYS: 21
RAD ONC ARIA FIRST TREATMENT DATE: NORMAL
RAD ONC ARIA PLAN FRACTIONS TREATED TO DATE: 15
RAD ONC ARIA PLAN ID: NORMAL
RAD ONC ARIA PLAN NAME: NORMAL
RAD ONC ARIA PLAN PRESCRIBED DOSE PER FRACTION: 1.8 GY
RAD ONC ARIA PLAN PRIMARY REFERENCE POINT: NORMAL
RAD ONC ARIA PLAN TOTAL FRACTIONS PRESCRIBED: 28
RAD ONC ARIA PLAN TOTAL PRESCRIBED DOSE: 5040 CGY
RAD ONC ARIA REFERENCE POINT DOSAGE GIVEN TO DATE: 27 GY
RAD ONC ARIA REFERENCE POINT ID: NORMAL
RAD ONC ARIA REFERENCE POINT SESSION DOSAGE GIVEN: 1.8 GY

## 2022-08-09 PROCEDURE — 77386: CPT | Performed by: RADIOLOGY

## 2022-08-10 ENCOUNTER — OFFICE VISIT (OUTPATIENT)
Dept: ONCOLOGY | Facility: CLINIC | Age: 62
End: 2022-08-10

## 2022-08-10 ENCOUNTER — LAB (OUTPATIENT)
Dept: LAB | Facility: HOSPITAL | Age: 62
End: 2022-08-10

## 2022-08-10 ENCOUNTER — HOSPITAL ENCOUNTER (OUTPATIENT)
Dept: RADIATION ONCOLOGY | Facility: HOSPITAL | Age: 62
Setting detail: RADIATION/ONCOLOGY SERIES
Discharge: HOME | End: 2022-08-10

## 2022-08-10 VITALS
TEMPERATURE: 98.3 F | HEART RATE: 75 BPM | SYSTOLIC BLOOD PRESSURE: 142 MMHG | RESPIRATION RATE: 18 BRPM | DIASTOLIC BLOOD PRESSURE: 92 MMHG | WEIGHT: 213.9 LBS | OXYGEN SATURATION: 97 % | HEIGHT: 65 IN | BODY MASS INDEX: 35.64 KG/M2

## 2022-08-10 DIAGNOSIS — C54.1 ENDOMETRIAL CARCINOMA: Primary | ICD-10-CM

## 2022-08-10 LAB
ALBUMIN SERPL-MCNC: 4.2 G/DL (ref 3.5–5.2)
ALBUMIN/GLOB SERPL: 1.7 G/DL
ALP SERPL-CCNC: 79 U/L (ref 39–117)
ALT SERPL W P-5'-P-CCNC: 23 U/L (ref 1–33)
ANION GAP SERPL CALCULATED.3IONS-SCNC: 8 MMOL/L (ref 5–15)
AST SERPL-CCNC: 20 U/L (ref 1–32)
BASOPHILS # BLD AUTO: 0.04 10*3/MM3 (ref 0–0.2)
BASOPHILS NFR BLD AUTO: 1 % (ref 0–1.5)
BILIRUB SERPL-MCNC: 0.2 MG/DL (ref 0–1.2)
BUN SERPL-MCNC: 14 MG/DL (ref 8–23)
BUN/CREAT SERPL: 21.5 (ref 7–25)
CALCIUM SPEC-SCNC: 9.6 MG/DL (ref 8.6–10.5)
CANCER AG125 SERPL QL: 11.3 U/ML (ref 0–38.1)
CHLORIDE SERPL-SCNC: 102 MMOL/L (ref 98–107)
CO2 SERPL-SCNC: 29 MMOL/L (ref 22–29)
CREAT SERPL-MCNC: 0.65 MG/DL (ref 0.57–1)
DEPRECATED RDW RBC AUTO: 52.7 FL (ref 37–54)
EGFRCR SERPLBLD CKD-EPI 2021: 99.1 ML/MIN/1.73
EOSINOPHIL # BLD AUTO: 0.26 10*3/MM3 (ref 0–0.4)
EOSINOPHIL NFR BLD AUTO: 6.5 % (ref 0.3–6.2)
ERYTHROCYTE [DISTWIDTH] IN BLOOD BY AUTOMATED COUNT: 15.8 % (ref 12.3–15.4)
GLOBULIN UR ELPH-MCNC: 2.5 GM/DL
GLUCOSE SERPL-MCNC: 107 MG/DL (ref 65–99)
HCT VFR BLD AUTO: 41.7 % (ref 34–46.6)
HGB BLD-MCNC: 13.6 G/DL (ref 12–15.9)
HOLD SPECIMEN: NORMAL
IMM GRANULOCYTES # BLD AUTO: 0.01 10*3/MM3 (ref 0–0.05)
IMM GRANULOCYTES NFR BLD AUTO: 0.3 % (ref 0–0.5)
LYMPHOCYTES # BLD AUTO: 0.44 10*3/MM3 (ref 0.7–3.1)
LYMPHOCYTES NFR BLD AUTO: 11 % (ref 19.6–45.3)
MAGNESIUM SERPL-MCNC: 2 MG/DL (ref 1.6–2.4)
MCH RBC QN AUTO: 29.6 PG (ref 26.6–33)
MCHC RBC AUTO-ENTMCNC: 32.6 G/DL (ref 31.5–35.7)
MCV RBC AUTO: 90.8 FL (ref 79–97)
MONOCYTES # BLD AUTO: 0.39 10*3/MM3 (ref 0.1–0.9)
MONOCYTES NFR BLD AUTO: 9.8 % (ref 5–12)
NEUTROPHILS NFR BLD AUTO: 2.86 10*3/MM3 (ref 1.7–7)
NEUTROPHILS NFR BLD AUTO: 71.4 % (ref 42.7–76)
NRBC BLD AUTO-RTO: 0 /100 WBC (ref 0–0.2)
PLATELET # BLD AUTO: 177 10*3/MM3 (ref 140–450)
PMV BLD AUTO: 9.3 FL (ref 6–12)
POTASSIUM SERPL-SCNC: 4.2 MMOL/L (ref 3.5–5.2)
PROT SERPL-MCNC: 6.7 G/DL (ref 6–8.5)
RAD ONC ARIA COURSE ID: NORMAL
RAD ONC ARIA COURSE LAST TREATMENT DATE: NORMAL
RAD ONC ARIA COURSE START DATE: NORMAL
RAD ONC ARIA COURSE TREATMENT ELAPSED DAYS: 22
RAD ONC ARIA FIRST TREATMENT DATE: NORMAL
RAD ONC ARIA PLAN FRACTIONS TREATED TO DATE: 16
RAD ONC ARIA PLAN ID: NORMAL
RAD ONC ARIA PLAN NAME: NORMAL
RAD ONC ARIA PLAN PRESCRIBED DOSE PER FRACTION: 1.8 GY
RAD ONC ARIA PLAN PRIMARY REFERENCE POINT: NORMAL
RAD ONC ARIA PLAN TOTAL FRACTIONS PRESCRIBED: 28
RAD ONC ARIA PLAN TOTAL PRESCRIBED DOSE: 5040 CGY
RAD ONC ARIA REFERENCE POINT DOSAGE GIVEN TO DATE: 28.8 GY
RAD ONC ARIA REFERENCE POINT ID: NORMAL
RAD ONC ARIA REFERENCE POINT SESSION DOSAGE GIVEN: 1.8 GY
RBC # BLD AUTO: 4.59 10*6/MM3 (ref 3.77–5.28)
SODIUM SERPL-SCNC: 139 MMOL/L (ref 136–145)
WBC NRBC COR # BLD: 4 10*3/MM3 (ref 3.4–10.8)

## 2022-08-10 PROCEDURE — 85025 COMPLETE CBC W/AUTO DIFF WBC: CPT

## 2022-08-10 PROCEDURE — 36415 COLL VENOUS BLD VENIPUNCTURE: CPT

## 2022-08-10 PROCEDURE — 80053 COMPREHEN METABOLIC PANEL: CPT

## 2022-08-10 PROCEDURE — 83735 ASSAY OF MAGNESIUM: CPT

## 2022-08-10 PROCEDURE — 99215 OFFICE O/P EST HI 40 MIN: CPT | Performed by: INTERNAL MEDICINE

## 2022-08-10 PROCEDURE — 86304 IMMUNOASSAY TUMOR CA 125: CPT

## 2022-08-10 PROCEDURE — 77386: CPT | Performed by: RADIOLOGY

## 2022-08-11 ENCOUNTER — HOSPITAL ENCOUNTER (OUTPATIENT)
Dept: RADIATION ONCOLOGY | Facility: HOSPITAL | Age: 62
Setting detail: RADIATION/ONCOLOGY SERIES
Discharge: HOME | End: 2022-08-11

## 2022-08-11 LAB
RAD ONC ARIA COURSE ID: NORMAL
RAD ONC ARIA COURSE LAST TREATMENT DATE: NORMAL
RAD ONC ARIA COURSE START DATE: NORMAL
RAD ONC ARIA COURSE TREATMENT ELAPSED DAYS: 23
RAD ONC ARIA FIRST TREATMENT DATE: NORMAL
RAD ONC ARIA PLAN FRACTIONS TREATED TO DATE: 17
RAD ONC ARIA PLAN ID: NORMAL
RAD ONC ARIA PLAN NAME: NORMAL
RAD ONC ARIA PLAN PRESCRIBED DOSE PER FRACTION: 1.8 GY
RAD ONC ARIA PLAN PRIMARY REFERENCE POINT: NORMAL
RAD ONC ARIA PLAN TOTAL FRACTIONS PRESCRIBED: 28
RAD ONC ARIA PLAN TOTAL PRESCRIBED DOSE: 5040 CGY
RAD ONC ARIA REFERENCE POINT DOSAGE GIVEN TO DATE: 30.6 GY
RAD ONC ARIA REFERENCE POINT ID: NORMAL
RAD ONC ARIA REFERENCE POINT SESSION DOSAGE GIVEN: 1.8 GY

## 2022-08-11 PROCEDURE — 77386: CPT | Performed by: RADIOLOGY

## 2022-08-12 ENCOUNTER — HOSPITAL ENCOUNTER (OUTPATIENT)
Dept: RADIATION ONCOLOGY | Facility: HOSPITAL | Age: 62
Setting detail: RADIATION/ONCOLOGY SERIES
Discharge: HOME | End: 2022-08-12

## 2022-08-12 LAB
RAD ONC ARIA COURSE ID: NORMAL
RAD ONC ARIA COURSE LAST TREATMENT DATE: NORMAL
RAD ONC ARIA COURSE START DATE: NORMAL
RAD ONC ARIA COURSE TREATMENT ELAPSED DAYS: 24
RAD ONC ARIA FIRST TREATMENT DATE: NORMAL
RAD ONC ARIA PLAN FRACTIONS TREATED TO DATE: 18
RAD ONC ARIA PLAN ID: NORMAL
RAD ONC ARIA PLAN NAME: NORMAL
RAD ONC ARIA PLAN PRESCRIBED DOSE PER FRACTION: 1.8 GY
RAD ONC ARIA PLAN PRIMARY REFERENCE POINT: NORMAL
RAD ONC ARIA PLAN TOTAL FRACTIONS PRESCRIBED: 28
RAD ONC ARIA PLAN TOTAL PRESCRIBED DOSE: 5040 CGY
RAD ONC ARIA REFERENCE POINT DOSAGE GIVEN TO DATE: 32.4 GY
RAD ONC ARIA REFERENCE POINT ID: NORMAL
RAD ONC ARIA REFERENCE POINT SESSION DOSAGE GIVEN: 1.8 GY

## 2022-08-12 PROCEDURE — 77386: CPT | Performed by: RADIOLOGY

## 2022-08-15 ENCOUNTER — HOSPITAL ENCOUNTER (OUTPATIENT)
Dept: RADIATION ONCOLOGY | Facility: HOSPITAL | Age: 62
Setting detail: RADIATION/ONCOLOGY SERIES
Discharge: HOME | End: 2022-08-15

## 2022-08-15 LAB
RAD ONC ARIA COURSE ID: NORMAL
RAD ONC ARIA COURSE LAST TREATMENT DATE: NORMAL
RAD ONC ARIA COURSE START DATE: NORMAL
RAD ONC ARIA COURSE TREATMENT ELAPSED DAYS: 27
RAD ONC ARIA FIRST TREATMENT DATE: NORMAL
RAD ONC ARIA PLAN FRACTIONS TREATED TO DATE: 19
RAD ONC ARIA PLAN ID: NORMAL
RAD ONC ARIA PLAN NAME: NORMAL
RAD ONC ARIA PLAN PRESCRIBED DOSE PER FRACTION: 1.8 GY
RAD ONC ARIA PLAN PRIMARY REFERENCE POINT: NORMAL
RAD ONC ARIA PLAN TOTAL FRACTIONS PRESCRIBED: 28
RAD ONC ARIA PLAN TOTAL PRESCRIBED DOSE: 5040 CGY
RAD ONC ARIA REFERENCE POINT DOSAGE GIVEN TO DATE: 34.2 GY
RAD ONC ARIA REFERENCE POINT ID: NORMAL
RAD ONC ARIA REFERENCE POINT SESSION DOSAGE GIVEN: 1.8 GY

## 2022-08-15 PROCEDURE — 77386: CPT | Performed by: RADIOLOGY

## 2022-08-16 ENCOUNTER — HOSPITAL ENCOUNTER (OUTPATIENT)
Dept: RADIATION ONCOLOGY | Facility: HOSPITAL | Age: 62
Setting detail: RADIATION/ONCOLOGY SERIES
Discharge: HOME | End: 2022-08-16

## 2022-08-16 LAB
RAD ONC ARIA COURSE ID: NORMAL
RAD ONC ARIA COURSE LAST TREATMENT DATE: NORMAL
RAD ONC ARIA COURSE START DATE: NORMAL
RAD ONC ARIA COURSE TREATMENT ELAPSED DAYS: 28
RAD ONC ARIA FIRST TREATMENT DATE: NORMAL
RAD ONC ARIA PLAN FRACTIONS TREATED TO DATE: 20
RAD ONC ARIA PLAN ID: NORMAL
RAD ONC ARIA PLAN NAME: NORMAL
RAD ONC ARIA PLAN PRESCRIBED DOSE PER FRACTION: 1.8 GY
RAD ONC ARIA PLAN PRIMARY REFERENCE POINT: NORMAL
RAD ONC ARIA PLAN TOTAL FRACTIONS PRESCRIBED: 28
RAD ONC ARIA PLAN TOTAL PRESCRIBED DOSE: 5040 CGY
RAD ONC ARIA REFERENCE POINT DOSAGE GIVEN TO DATE: 36 GY
RAD ONC ARIA REFERENCE POINT ID: NORMAL
RAD ONC ARIA REFERENCE POINT SESSION DOSAGE GIVEN: 1.8 GY

## 2022-08-16 PROCEDURE — 77386: CPT | Performed by: RADIOLOGY

## 2022-08-16 RX ORDER — HYDROCORTISONE 25 MG/G
CREAM TOPICAL 2 TIMES DAILY
Qty: 28 G | Refills: 3 | Status: SHIPPED | OUTPATIENT
Start: 2022-08-16 | End: 2022-11-02

## 2022-08-17 ENCOUNTER — HOSPITAL ENCOUNTER (OUTPATIENT)
Dept: RADIATION ONCOLOGY | Facility: HOSPITAL | Age: 62
Setting detail: RADIATION/ONCOLOGY SERIES
Discharge: HOME | End: 2022-08-17

## 2022-08-17 PROCEDURE — 77316 BRACHYTX ISODOSE PLAN SIMPLE: CPT | Performed by: RADIOLOGY

## 2022-08-17 PROCEDURE — 77770 HDR RDNCL NTRSTL/ICAV BRCHTX: CPT | Performed by: RADIOLOGY

## 2022-08-17 PROCEDURE — 77290 THER RAD SIMULAJ FIELD CPLX: CPT | Performed by: RADIOLOGY

## 2022-08-17 PROCEDURE — 57156 INS VAG BRACHYTX DEVICE: CPT | Performed by: RADIOLOGY

## 2022-08-17 PROCEDURE — C1717 BRACHYTX, NON-STR,HDR IR-192: HCPCS | Performed by: RADIOLOGY

## 2022-08-18 ENCOUNTER — HOSPITAL ENCOUNTER (OUTPATIENT)
Dept: RADIATION ONCOLOGY | Facility: HOSPITAL | Age: 62
Setting detail: RADIATION/ONCOLOGY SERIES
Discharge: HOME | End: 2022-08-18

## 2022-08-18 LAB
RAD ONC ARIA COURSE ID: NORMAL
RAD ONC ARIA COURSE LAST TREATMENT DATE: NORMAL
RAD ONC ARIA COURSE START DATE: NORMAL
RAD ONC ARIA COURSE TREATMENT ELAPSED DAYS: 30
RAD ONC ARIA FIRST TREATMENT DATE: NORMAL
RAD ONC ARIA PLAN FRACTIONS TREATED TO DATE: 21
RAD ONC ARIA PLAN ID: NORMAL
RAD ONC ARIA PLAN NAME: NORMAL
RAD ONC ARIA PLAN PRESCRIBED DOSE PER FRACTION: 1.8 GY
RAD ONC ARIA PLAN PRIMARY REFERENCE POINT: NORMAL
RAD ONC ARIA PLAN TOTAL FRACTIONS PRESCRIBED: 28
RAD ONC ARIA PLAN TOTAL PRESCRIBED DOSE: 5040 CGY
RAD ONC ARIA REFERENCE POINT DOSAGE GIVEN TO DATE: 37.8 GY
RAD ONC ARIA REFERENCE POINT ID: NORMAL
RAD ONC ARIA REFERENCE POINT SESSION DOSAGE GIVEN: 1.8 GY

## 2022-08-18 PROCEDURE — 77336 RADIATION PHYSICS CONSULT: CPT | Performed by: RADIOLOGY

## 2022-08-18 PROCEDURE — 77386: CPT | Performed by: RADIOLOGY

## 2022-08-19 ENCOUNTER — HOSPITAL ENCOUNTER (OUTPATIENT)
Dept: RADIATION ONCOLOGY | Facility: HOSPITAL | Age: 62
Setting detail: RADIATION/ONCOLOGY SERIES
Discharge: HOME | End: 2022-08-19

## 2022-08-19 LAB
RAD ONC ARIA COURSE ID: NORMAL
RAD ONC ARIA COURSE LAST TREATMENT DATE: NORMAL
RAD ONC ARIA COURSE START DATE: NORMAL
RAD ONC ARIA COURSE TREATMENT ELAPSED DAYS: 31
RAD ONC ARIA FIRST TREATMENT DATE: NORMAL
RAD ONC ARIA PLAN FRACTIONS TREATED TO DATE: 22
RAD ONC ARIA PLAN ID: NORMAL
RAD ONC ARIA PLAN NAME: NORMAL
RAD ONC ARIA PLAN PRESCRIBED DOSE PER FRACTION: 1.8 GY
RAD ONC ARIA PLAN PRIMARY REFERENCE POINT: NORMAL
RAD ONC ARIA PLAN TOTAL FRACTIONS PRESCRIBED: 28
RAD ONC ARIA PLAN TOTAL PRESCRIBED DOSE: 5040 CGY
RAD ONC ARIA REFERENCE POINT DOSAGE GIVEN TO DATE: 39.6 GY
RAD ONC ARIA REFERENCE POINT ID: NORMAL
RAD ONC ARIA REFERENCE POINT SESSION DOSAGE GIVEN: 1.8 GY

## 2022-08-19 PROCEDURE — 77386: CPT | Performed by: RADIOLOGY

## 2022-08-22 ENCOUNTER — HOSPITAL ENCOUNTER (OUTPATIENT)
Dept: RADIATION ONCOLOGY | Facility: HOSPITAL | Age: 62
Setting detail: RADIATION/ONCOLOGY SERIES
Discharge: HOME | End: 2022-08-22

## 2022-08-22 LAB
RAD ONC ARIA COURSE ID: NORMAL
RAD ONC ARIA COURSE LAST TREATMENT DATE: NORMAL
RAD ONC ARIA COURSE START DATE: NORMAL
RAD ONC ARIA COURSE TREATMENT ELAPSED DAYS: 34
RAD ONC ARIA FIRST TREATMENT DATE: NORMAL
RAD ONC ARIA PLAN FRACTIONS TREATED TO DATE: 23
RAD ONC ARIA PLAN ID: NORMAL
RAD ONC ARIA PLAN NAME: NORMAL
RAD ONC ARIA PLAN PRESCRIBED DOSE PER FRACTION: 1.8 GY
RAD ONC ARIA PLAN PRIMARY REFERENCE POINT: NORMAL
RAD ONC ARIA PLAN TOTAL FRACTIONS PRESCRIBED: 28
RAD ONC ARIA PLAN TOTAL PRESCRIBED DOSE: 5040 CGY
RAD ONC ARIA REFERENCE POINT DOSAGE GIVEN TO DATE: 41.4 GY
RAD ONC ARIA REFERENCE POINT ID: NORMAL
RAD ONC ARIA REFERENCE POINT SESSION DOSAGE GIVEN: 1.8 GY

## 2022-08-22 PROCEDURE — 77386: CPT | Performed by: RADIOLOGY

## 2022-08-23 ENCOUNTER — HOSPITAL ENCOUNTER (OUTPATIENT)
Dept: RADIATION ONCOLOGY | Facility: HOSPITAL | Age: 62
Setting detail: RADIATION/ONCOLOGY SERIES
Discharge: HOME | End: 2022-08-23

## 2022-08-23 LAB
RAD ONC ARIA COURSE ID: NORMAL
RAD ONC ARIA COURSE LAST TREATMENT DATE: NORMAL
RAD ONC ARIA COURSE START DATE: NORMAL
RAD ONC ARIA COURSE TREATMENT ELAPSED DAYS: 35
RAD ONC ARIA FIRST TREATMENT DATE: NORMAL
RAD ONC ARIA PLAN FRACTIONS TREATED TO DATE: 24
RAD ONC ARIA PLAN ID: NORMAL
RAD ONC ARIA PLAN NAME: NORMAL
RAD ONC ARIA PLAN PRESCRIBED DOSE PER FRACTION: 1.8 GY
RAD ONC ARIA PLAN PRIMARY REFERENCE POINT: NORMAL
RAD ONC ARIA PLAN TOTAL FRACTIONS PRESCRIBED: 28
RAD ONC ARIA PLAN TOTAL PRESCRIBED DOSE: 5040 CGY
RAD ONC ARIA REFERENCE POINT DOSAGE GIVEN TO DATE: 43.2 GY
RAD ONC ARIA REFERENCE POINT ID: NORMAL
RAD ONC ARIA REFERENCE POINT SESSION DOSAGE GIVEN: 1.8 GY

## 2022-08-23 PROCEDURE — 77386: CPT | Performed by: RADIOLOGY

## 2022-08-24 ENCOUNTER — HOSPITAL ENCOUNTER (OUTPATIENT)
Dept: RADIATION ONCOLOGY | Facility: HOSPITAL | Age: 62
Setting detail: RADIATION/ONCOLOGY SERIES
Discharge: HOME | End: 2022-08-24

## 2022-08-24 PROCEDURE — 57156 INS VAG BRACHYTX DEVICE: CPT | Performed by: RADIOLOGY

## 2022-08-24 PROCEDURE — 77770 HDR RDNCL NTRSTL/ICAV BRCHTX: CPT | Performed by: RADIOLOGY

## 2022-08-24 PROCEDURE — 77290 THER RAD SIMULAJ FIELD CPLX: CPT | Performed by: RADIOLOGY

## 2022-08-24 PROCEDURE — C1717 BRACHYTX, NON-STR,HDR IR-192: HCPCS | Performed by: RADIOLOGY

## 2022-08-24 PROCEDURE — 77316 BRACHYTX ISODOSE PLAN SIMPLE: CPT | Performed by: RADIOLOGY

## 2022-08-25 ENCOUNTER — HOSPITAL ENCOUNTER (OUTPATIENT)
Dept: RADIATION ONCOLOGY | Facility: HOSPITAL | Age: 62
Setting detail: RADIATION/ONCOLOGY SERIES
Discharge: HOME | End: 2022-08-25

## 2022-08-25 LAB
RAD ONC ARIA COURSE ID: NORMAL
RAD ONC ARIA COURSE LAST TREATMENT DATE: NORMAL
RAD ONC ARIA COURSE START DATE: NORMAL
RAD ONC ARIA COURSE TREATMENT ELAPSED DAYS: 37
RAD ONC ARIA FIRST TREATMENT DATE: NORMAL
RAD ONC ARIA PLAN FRACTIONS TREATED TO DATE: 25
RAD ONC ARIA PLAN ID: NORMAL
RAD ONC ARIA PLAN NAME: NORMAL
RAD ONC ARIA PLAN PRESCRIBED DOSE PER FRACTION: 1.8 GY
RAD ONC ARIA PLAN PRIMARY REFERENCE POINT: NORMAL
RAD ONC ARIA PLAN TOTAL FRACTIONS PRESCRIBED: 28
RAD ONC ARIA PLAN TOTAL PRESCRIBED DOSE: 5040 CGY
RAD ONC ARIA REFERENCE POINT DOSAGE GIVEN TO DATE: 45 GY
RAD ONC ARIA REFERENCE POINT ID: NORMAL
RAD ONC ARIA REFERENCE POINT SESSION DOSAGE GIVEN: 1.8 GY

## 2022-08-25 PROCEDURE — 77336 RADIATION PHYSICS CONSULT: CPT | Performed by: RADIOLOGY

## 2022-08-25 PROCEDURE — 77386: CPT | Performed by: RADIOLOGY

## 2022-08-26 ENCOUNTER — HOSPITAL ENCOUNTER (OUTPATIENT)
Dept: RADIATION ONCOLOGY | Facility: HOSPITAL | Age: 62
Setting detail: RADIATION/ONCOLOGY SERIES
Discharge: HOME | End: 2022-08-26

## 2022-08-26 LAB
RAD ONC ARIA COURSE ID: NORMAL
RAD ONC ARIA COURSE LAST TREATMENT DATE: NORMAL
RAD ONC ARIA COURSE START DATE: NORMAL
RAD ONC ARIA COURSE TREATMENT ELAPSED DAYS: 38
RAD ONC ARIA FIRST TREATMENT DATE: NORMAL
RAD ONC ARIA PLAN FRACTIONS TREATED TO DATE: 26
RAD ONC ARIA PLAN ID: NORMAL
RAD ONC ARIA PLAN NAME: NORMAL
RAD ONC ARIA PLAN PRESCRIBED DOSE PER FRACTION: 1.8 GY
RAD ONC ARIA PLAN PRIMARY REFERENCE POINT: NORMAL
RAD ONC ARIA PLAN TOTAL FRACTIONS PRESCRIBED: 28
RAD ONC ARIA PLAN TOTAL PRESCRIBED DOSE: 5040 CGY
RAD ONC ARIA REFERENCE POINT DOSAGE GIVEN TO DATE: 46.8 GY
RAD ONC ARIA REFERENCE POINT ID: NORMAL
RAD ONC ARIA REFERENCE POINT SESSION DOSAGE GIVEN: 1.8 GY

## 2022-08-26 PROCEDURE — 77386: CPT | Performed by: RADIOLOGY

## 2022-08-29 ENCOUNTER — HOSPITAL ENCOUNTER (OUTPATIENT)
Dept: RADIATION ONCOLOGY | Facility: HOSPITAL | Age: 62
Setting detail: RADIATION/ONCOLOGY SERIES
Discharge: HOME | End: 2022-08-29

## 2022-08-29 LAB
RAD ONC ARIA COURSE ID: NORMAL
RAD ONC ARIA COURSE LAST TREATMENT DATE: NORMAL
RAD ONC ARIA COURSE START DATE: NORMAL
RAD ONC ARIA COURSE TREATMENT ELAPSED DAYS: 41
RAD ONC ARIA FIRST TREATMENT DATE: NORMAL
RAD ONC ARIA PLAN FRACTIONS TREATED TO DATE: 27
RAD ONC ARIA PLAN ID: NORMAL
RAD ONC ARIA PLAN NAME: NORMAL
RAD ONC ARIA PLAN PRESCRIBED DOSE PER FRACTION: 1.8 GY
RAD ONC ARIA PLAN PRIMARY REFERENCE POINT: NORMAL
RAD ONC ARIA PLAN TOTAL FRACTIONS PRESCRIBED: 28
RAD ONC ARIA PLAN TOTAL PRESCRIBED DOSE: 5040 CGY
RAD ONC ARIA REFERENCE POINT DOSAGE GIVEN TO DATE: 48.6 GY
RAD ONC ARIA REFERENCE POINT ID: NORMAL
RAD ONC ARIA REFERENCE POINT SESSION DOSAGE GIVEN: 1.8 GY

## 2022-08-29 PROCEDURE — 77386: CPT | Performed by: RADIOLOGY

## 2022-08-29 RX ORDER — FAMOTIDINE 40 MG/1
TABLET, FILM COATED ORAL
Qty: 30 TABLET | Refills: 2 | Status: SHIPPED | OUTPATIENT
Start: 2022-08-29 | End: 2022-12-01 | Stop reason: SDUPTHER

## 2022-08-30 ENCOUNTER — HOSPITAL ENCOUNTER (OUTPATIENT)
Dept: RADIATION ONCOLOGY | Facility: HOSPITAL | Age: 62
Setting detail: RADIATION/ONCOLOGY SERIES
Discharge: HOME | End: 2022-08-30

## 2022-08-30 LAB
RAD ONC ARIA COURSE ID: NORMAL
RAD ONC ARIA COURSE LAST TREATMENT DATE: NORMAL
RAD ONC ARIA COURSE START DATE: NORMAL
RAD ONC ARIA COURSE TREATMENT ELAPSED DAYS: 42
RAD ONC ARIA FIRST TREATMENT DATE: NORMAL
RAD ONC ARIA PLAN FRACTIONS TREATED TO DATE: 28
RAD ONC ARIA PLAN ID: NORMAL
RAD ONC ARIA PLAN NAME: NORMAL
RAD ONC ARIA PLAN PRESCRIBED DOSE PER FRACTION: 1.8 GY
RAD ONC ARIA PLAN PRIMARY REFERENCE POINT: NORMAL
RAD ONC ARIA PLAN TOTAL FRACTIONS PRESCRIBED: 28
RAD ONC ARIA PLAN TOTAL PRESCRIBED DOSE: 5040 CGY
RAD ONC ARIA REFERENCE POINT DOSAGE GIVEN TO DATE: 50.4 GY
RAD ONC ARIA REFERENCE POINT ID: NORMAL
RAD ONC ARIA REFERENCE POINT SESSION DOSAGE GIVEN: 1.8 GY

## 2022-08-30 PROCEDURE — 77336 RADIATION PHYSICS CONSULT: CPT | Performed by: RADIOLOGY

## 2022-08-30 PROCEDURE — 77386: CPT | Performed by: RADIOLOGY

## 2022-09-06 ENCOUNTER — TELEPHONE (OUTPATIENT)
Dept: ONCOLOGY | Facility: CLINIC | Age: 62
End: 2022-09-06

## 2022-09-06 ENCOUNTER — LAB (OUTPATIENT)
Dept: LAB | Facility: HOSPITAL | Age: 62
End: 2022-09-06

## 2022-09-06 ENCOUNTER — INFUSION (OUTPATIENT)
Dept: ONCOLOGY | Facility: HOSPITAL | Age: 62
End: 2022-09-06

## 2022-09-06 VITALS
WEIGHT: 214 LBS | TEMPERATURE: 96.9 F | RESPIRATION RATE: 18 BRPM | SYSTOLIC BLOOD PRESSURE: 105 MMHG | DIASTOLIC BLOOD PRESSURE: 62 MMHG | HEIGHT: 65 IN | BODY MASS INDEX: 35.65 KG/M2 | HEART RATE: 50 BPM | OXYGEN SATURATION: 97 %

## 2022-09-06 DIAGNOSIS — Z45.2 FITTING AND ADJUSTMENT OF VASCULAR CATHETER: Primary | ICD-10-CM

## 2022-09-06 DIAGNOSIS — C54.1 ENDOMETRIAL CARCINOMA: ICD-10-CM

## 2022-09-06 LAB
ALBUMIN SERPL-MCNC: 4.3 G/DL (ref 3.5–5.2)
ALBUMIN/GLOB SERPL: 2 G/DL
ALP SERPL-CCNC: 70 U/L (ref 39–117)
ALT SERPL W P-5'-P-CCNC: 18 U/L (ref 1–33)
ANION GAP SERPL CALCULATED.3IONS-SCNC: 7 MMOL/L (ref 5–15)
AST SERPL-CCNC: 16 U/L (ref 1–32)
BASOPHILS # BLD AUTO: 0.01 10*3/MM3 (ref 0–0.2)
BASOPHILS NFR BLD AUTO: 0.1 % (ref 0–1.5)
BILIRUB SERPL-MCNC: 0.4 MG/DL (ref 0–1.2)
BUN SERPL-MCNC: 15 MG/DL (ref 8–23)
BUN/CREAT SERPL: 22.1 (ref 7–25)
CALCIUM SPEC-SCNC: 9.3 MG/DL (ref 8.6–10.5)
CHLORIDE SERPL-SCNC: 105 MMOL/L (ref 98–107)
CO2 SERPL-SCNC: 27 MMOL/L (ref 22–29)
CREAT SERPL-MCNC: 0.68 MG/DL (ref 0.57–1)
DEPRECATED RDW RBC AUTO: 43.6 FL (ref 37–54)
EGFRCR SERPLBLD CKD-EPI 2021: 98 ML/MIN/1.73
EOSINOPHIL # BLD AUTO: 0 10*3/MM3 (ref 0–0.4)
EOSINOPHIL NFR BLD AUTO: 0 % (ref 0.3–6.2)
ERYTHROCYTE [DISTWIDTH] IN BLOOD BY AUTOMATED COUNT: 13.3 % (ref 12.3–15.4)
GLOBULIN UR ELPH-MCNC: 2.1 GM/DL
GLUCOSE SERPL-MCNC: 104 MG/DL (ref 65–99)
HCT VFR BLD AUTO: 37.6 % (ref 34–46.6)
HGB BLD-MCNC: 12.8 G/DL (ref 12–15.9)
IMM GRANULOCYTES # BLD AUTO: 0.08 10*3/MM3 (ref 0–0.05)
IMM GRANULOCYTES NFR BLD AUTO: 1 % (ref 0–0.5)
LYMPHOCYTES # BLD AUTO: 0.3 10*3/MM3 (ref 0.7–3.1)
LYMPHOCYTES NFR BLD AUTO: 3.6 % (ref 19.6–45.3)
MAGNESIUM SERPL-MCNC: 1.9 MG/DL (ref 1.6–2.4)
MCH RBC QN AUTO: 30.3 PG (ref 26.6–33)
MCHC RBC AUTO-ENTMCNC: 34 G/DL (ref 31.5–35.7)
MCV RBC AUTO: 88.9 FL (ref 79–97)
MONOCYTES # BLD AUTO: 0.43 10*3/MM3 (ref 0.1–0.9)
MONOCYTES NFR BLD AUTO: 5.1 % (ref 5–12)
NEUTROPHILS NFR BLD AUTO: 7.59 10*3/MM3 (ref 1.7–7)
NEUTROPHILS NFR BLD AUTO: 90.2 % (ref 42.7–76)
NRBC BLD AUTO-RTO: 0 /100 WBC (ref 0–0.2)
PLATELET # BLD AUTO: 224 10*3/MM3 (ref 140–450)
PMV BLD AUTO: 9.9 FL (ref 6–12)
POTASSIUM SERPL-SCNC: 3.7 MMOL/L (ref 3.5–5.2)
PROT SERPL-MCNC: 6.4 G/DL (ref 6–8.5)
RBC # BLD AUTO: 4.23 10*6/MM3 (ref 3.77–5.28)
SODIUM SERPL-SCNC: 139 MMOL/L (ref 136–145)
WBC NRBC COR # BLD: 8.41 10*3/MM3 (ref 3.4–10.8)

## 2022-09-06 PROCEDURE — 83735 ASSAY OF MAGNESIUM: CPT

## 2022-09-06 PROCEDURE — 96367 TX/PROPH/DG ADDL SEQ IV INF: CPT

## 2022-09-06 PROCEDURE — 80053 COMPREHEN METABOLIC PANEL: CPT

## 2022-09-06 PROCEDURE — 25010000002 PACLITAXEL PER 1 MG: Performed by: INTERNAL MEDICINE

## 2022-09-06 PROCEDURE — 25010000002 FOSAPREPITANT PER 1 MG: Performed by: INTERNAL MEDICINE

## 2022-09-06 PROCEDURE — 96376 TX/PRO/DX INJ SAME DRUG ADON: CPT

## 2022-09-06 PROCEDURE — 25010000002 PALONOSETRON PER 25 MCG: Performed by: INTERNAL MEDICINE

## 2022-09-06 PROCEDURE — 25010000002 DIPHENHYDRAMINE PER 50 MG: Performed by: INTERNAL MEDICINE

## 2022-09-06 PROCEDURE — 96365 THER/PROPH/DIAG IV INF INIT: CPT

## 2022-09-06 PROCEDURE — 85025 COMPLETE CBC W/AUTO DIFF WBC: CPT

## 2022-09-06 PROCEDURE — 36415 COLL VENOUS BLD VENIPUNCTURE: CPT

## 2022-09-06 PROCEDURE — 25010000002 DEXAMETHASONE SODIUM PHOSPHATE 100 MG/10ML SOLUTION 10 ML VIAL: Performed by: INTERNAL MEDICINE

## 2022-09-06 PROCEDURE — 96375 TX/PRO/DX INJ NEW DRUG ADDON: CPT

## 2022-09-06 PROCEDURE — 25010000002 HEPARIN LOCK FLUSH PER 10 UNITS: Performed by: OBSTETRICS & GYNECOLOGY

## 2022-09-06 PROCEDURE — 25010000002 HYDROCORTISONE SODIUM SUCCINATE 100 MG RECONSTITUTED SOLUTION: Performed by: INTERNAL MEDICINE

## 2022-09-06 PROCEDURE — 96413 CHEMO IV INFUSION 1 HR: CPT

## 2022-09-06 RX ORDER — FAMOTIDINE 10 MG/ML
20 INJECTION, SOLUTION INTRAVENOUS ONCE
Status: CANCELLED
Start: 2022-09-06 | End: 2022-09-06

## 2022-09-06 RX ORDER — DIPHENHYDRAMINE HYDROCHLORIDE 50 MG/ML
50 INJECTION INTRAMUSCULAR; INTRAVENOUS AS NEEDED
Status: COMPLETED | OUTPATIENT
Start: 2022-09-06 | End: 2022-09-06

## 2022-09-06 RX ORDER — PALONOSETRON 0.05 MG/ML
0.25 INJECTION, SOLUTION INTRAVENOUS ONCE
Status: CANCELLED | OUTPATIENT
Start: 2022-09-06

## 2022-09-06 RX ORDER — FAMOTIDINE 10 MG/ML
20 INJECTION, SOLUTION INTRAVENOUS ONCE
Status: COMPLETED | OUTPATIENT
Start: 2022-09-06 | End: 2022-09-06

## 2022-09-06 RX ORDER — FAMOTIDINE 10 MG/ML
20 INJECTION, SOLUTION INTRAVENOUS AS NEEDED
Status: COMPLETED | OUTPATIENT
Start: 2022-09-06 | End: 2022-09-06

## 2022-09-06 RX ORDER — PALONOSETRON 0.05 MG/ML
0.25 INJECTION, SOLUTION INTRAVENOUS ONCE
Status: COMPLETED | OUTPATIENT
Start: 2022-09-06 | End: 2022-09-06

## 2022-09-06 RX ORDER — HEPARIN SODIUM (PORCINE) LOCK FLUSH IV SOLN 100 UNIT/ML 100 UNIT/ML
500 SOLUTION INTRAVENOUS AS NEEDED
Status: CANCELLED | OUTPATIENT
Start: 2022-09-06

## 2022-09-06 RX ORDER — DIPHENHYDRAMINE HYDROCHLORIDE 50 MG/ML
50 INJECTION INTRAMUSCULAR; INTRAVENOUS ONCE
Status: DISCONTINUED | OUTPATIENT
Start: 2022-09-06 | End: 2022-09-06

## 2022-09-06 RX ORDER — FAMOTIDINE 10 MG/ML
20 INJECTION, SOLUTION INTRAVENOUS AS NEEDED
Status: CANCELLED | OUTPATIENT
Start: 2022-09-06

## 2022-09-06 RX ORDER — SODIUM CHLORIDE 9 MG/ML
250 INJECTION, SOLUTION INTRAVENOUS ONCE
Status: COMPLETED | OUTPATIENT
Start: 2022-09-06 | End: 2022-09-06

## 2022-09-06 RX ORDER — DIPHENHYDRAMINE HYDROCHLORIDE 50 MG/ML
50 INJECTION INTRAMUSCULAR; INTRAVENOUS AS NEEDED
Status: CANCELLED | OUTPATIENT
Start: 2022-09-06

## 2022-09-06 RX ORDER — DIPHENHYDRAMINE HYDROCHLORIDE 50 MG/ML
50 INJECTION INTRAMUSCULAR; INTRAVENOUS ONCE
Status: CANCELLED
Start: 2022-09-06 | End: 2022-09-06

## 2022-09-06 RX ORDER — SODIUM CHLORIDE 0.9 % (FLUSH) 0.9 %
10 SYRINGE (ML) INJECTION AS NEEDED
Status: DISCONTINUED | OUTPATIENT
Start: 2022-09-06 | End: 2022-09-06 | Stop reason: HOSPADM

## 2022-09-06 RX ORDER — HEPARIN SODIUM (PORCINE) LOCK FLUSH IV SOLN 100 UNIT/ML 100 UNIT/ML
500 SOLUTION INTRAVENOUS AS NEEDED
Status: DISCONTINUED | OUTPATIENT
Start: 2022-09-06 | End: 2022-09-06 | Stop reason: HOSPADM

## 2022-09-06 RX ORDER — SODIUM CHLORIDE 9 MG/ML
250 INJECTION, SOLUTION INTRAVENOUS ONCE
Status: CANCELLED | OUTPATIENT
Start: 2022-09-06

## 2022-09-06 RX ORDER — SODIUM CHLORIDE 0.9 % (FLUSH) 0.9 %
10 SYRINGE (ML) INJECTION AS NEEDED
Status: CANCELLED | OUTPATIENT
Start: 2022-09-06

## 2022-09-06 RX ADMIN — Medication 10 ML: at 12:53

## 2022-09-06 RX ADMIN — DIPHENHYDRAMINE HYDROCHLORIDE 50 MG: 50 INJECTION, SOLUTION INTRAMUSCULAR; INTRAVENOUS at 10:39

## 2022-09-06 RX ADMIN — FOSAPREPITANT 150 MG: 150 INJECTION, POWDER, LYOPHILIZED, FOR SOLUTION INTRAVENOUS at 11:17

## 2022-09-06 RX ADMIN — SODIUM CHLORIDE 250 ML: 9 INJECTION, SOLUTION INTRAVENOUS at 10:05

## 2022-09-06 RX ADMIN — Medication 500 UNITS: at 12:53

## 2022-09-06 RX ADMIN — DIPHENHYDRAMINE HYDROCHLORIDE 50 MG: 50 INJECTION, SOLUTION INTRAMUSCULAR; INTRAVENOUS at 12:09

## 2022-09-06 RX ADMIN — FAMOTIDINE 20 MG: 10 INJECTION INTRAVENOUS at 10:36

## 2022-09-06 RX ADMIN — FAMOTIDINE 20 MG: 10 INJECTION INTRAVENOUS at 12:08

## 2022-09-06 RX ADMIN — PACLITAXEL 365 MG: 6 INJECTION, SOLUTION INTRAVENOUS at 11:51

## 2022-09-06 RX ADMIN — HYDROCORTISONE SODIUM SUCCINATE 100 MG: 100 INJECTION, POWDER, FOR SOLUTION INTRAMUSCULAR; INTRAVENOUS at 12:08

## 2022-09-06 RX ADMIN — PALONOSETRON HYDROCHLORIDE 0.25 MG: 0.25 INJECTION, SOLUTION INTRAVENOUS at 10:35

## 2022-09-06 RX ADMIN — DEXAMETHASONE SODIUM PHOSPHATE 20 MG: 10 INJECTION, SOLUTION INTRAMUSCULAR; INTRAVENOUS at 10:58

## 2022-09-06 NOTE — PROGRESS NOTES
1211 called the office and spoke with IRMA [place] pt is reacting to Taxol. Chest pain that radiates to her neck. Pain level a 5 at the time she reported reaction.  awaing return call from the office.    1207 pt called out with pain in chest that goes to her neck with every breathe. STOPPED TAXOL.  Angelarn came in with rescue meds. Vital signs taken. I called the office.     1215 irma madrid called back and stated the doctor wants to hold tx today. Have her return next week for possible tx and she stated the doctor will review premeds for prior to coming bc she is taking po decadron already. And was given several premeds today. I reminded reginaldo that this pt reacted to the first day of tx a few months ago.    1255 pt has no c/o now. Breathing without difficulty and no chest or neck pain now. Vss. Will call the office and let them know she is ok to dc. She understands to call the office and go to the er if any symptoms return or she has trouble breathing. And also knows to return next Tuesday for labs and possible tx.

## 2022-09-06 NOTE — TELEPHONE ENCOUNTER
Dr Lima,  Please review patient Rachel Sierra, current txt plan.  Patient has had previous reaction to use of Taxol and her pre-meds had  been adjusted to your current recommendations, patient also took the oral Dex day prior to txt and day of txt please advise

## 2022-09-06 NOTE — TELEPHONE ENCOUNTER
TREATMENT REACTION:  12:13 pm: received call from Nurse [facility]. She calls to report TXT reaction for patient Rachel Sierra. Nurse reports patient was aprox 15 minutes in her infusion of Taxol when patient c/o some chest pain which radiated into her neck area.   Infusion was stopped and patient was given rescue medications  B/P: 173/74  Current B/P: 155/67    12:14 pm: relayed all information to Dr Lima he recommends:  1. Hold TXT today  2. Move TXT out x 1 week  3. Will adjust premedications prior to patient next infusion.   4. Continue to monitor patient aprox 30+-45 minutes if stable ok to d/c home.   5. Pre-medications prior to TXT day to be prescribed  Dexamethasone 4 mg bid  Day before TXT and day of TXT  Will increase Benadryl to 50 mg IV premeds prior to TXT.   Pepcid    12:16 pm: relayed all information to Caitie/,   She will continue to monitor patient as instructed but does relay patient did take premeds at home for the past 2 days prior to this TXT     Will relay information to Dr Lima, so he can adjust accordingly.     12:50 pm: received call from Nurse [facility]. She calls to report Caitie/ is releasing patient Rachel Sierra home, patient is stable.

## 2022-09-07 ENCOUNTER — APPOINTMENT (OUTPATIENT)
Dept: ONCOLOGY | Facility: HOSPITAL | Age: 62
End: 2022-09-07

## 2022-09-07 NOTE — TELEPHONE ENCOUNTER
Yes, around 5/19/22 patient had a similar reaction when given Taxol, with chest tightness, etc. Her premeds were increased and changed to:  Dex IV: increased to 20 mg  Benadryl increased to 50 mg  Pepcid 20 mg IV  PO premeds were prescribed to be taken starting 2 days prior to txt Dex 4 mg bid   Patient did inform her Nurse yesterday that she did take her po meds as directed starting 2 days prior to her txt yesterday  Please advise

## 2022-09-09 NOTE — TELEPHONE ENCOUNTER
Wow, so many options.  That's why I need your expertise!    Is the Abraxane 175 mg/m2 dose equivalent to the Taxol 175 mg/m2, and can we dose it with carbo (AUC 6) both on day 1?      If not, lets go with either the NSCL regimen- Abraxane 100 mg/m2 D1,8,15+ carbo (AUC6?) q21D; or the triple neg mBRCA 125 mg/m2 D1&8 with carbo (AUC6?) q21D

## 2022-09-13 ENCOUNTER — TELEPHONE (OUTPATIENT)
Dept: ONCOLOGY | Facility: CLINIC | Age: 62
End: 2022-09-13

## 2022-09-13 ENCOUNTER — APPOINTMENT (OUTPATIENT)
Dept: ONCOLOGY | Facility: HOSPITAL | Age: 62
End: 2022-09-13

## 2022-09-13 ENCOUNTER — APPOINTMENT (OUTPATIENT)
Dept: LAB | Facility: HOSPITAL | Age: 62
End: 2022-09-13

## 2022-09-13 NOTE — TELEPHONE ENCOUNTER
Call from Raza in pharmacy that the new regimen has been approved by the patient's insurance. Per Elena she is Abraxane d1, d8, and d15, Carboplatin d1 every 21 days. Patient is scheduled for Cycle 4 day 1 on 09/15/2022 at 9:30

## 2022-09-15 ENCOUNTER — DOCUMENTATION (OUTPATIENT)
Dept: RADIATION ONCOLOGY | Facility: HOSPITAL | Age: 62
End: 2022-09-15

## 2022-09-15 ENCOUNTER — INFUSION (OUTPATIENT)
Dept: ONCOLOGY | Facility: HOSPITAL | Age: 62
End: 2022-09-15

## 2022-09-15 ENCOUNTER — LAB (OUTPATIENT)
Dept: LAB | Facility: HOSPITAL | Age: 62
End: 2022-09-15

## 2022-09-15 ENCOUNTER — TELEPHONE (OUTPATIENT)
Dept: ONCOLOGY | Facility: CLINIC | Age: 62
End: 2022-09-15

## 2022-09-15 VITALS
WEIGHT: 212.6 LBS | RESPIRATION RATE: 18 BRPM | BODY MASS INDEX: 35.42 KG/M2 | SYSTOLIC BLOOD PRESSURE: 116 MMHG | DIASTOLIC BLOOD PRESSURE: 59 MMHG | HEART RATE: 45 BPM | OXYGEN SATURATION: 100 % | TEMPERATURE: 97.5 F | HEIGHT: 65 IN

## 2022-09-15 DIAGNOSIS — C54.1 ENDOMETRIAL CARCINOMA: ICD-10-CM

## 2022-09-15 DIAGNOSIS — Z45.2 FITTING AND ADJUSTMENT OF VASCULAR CATHETER: ICD-10-CM

## 2022-09-15 DIAGNOSIS — C54.1 ENDOMETRIAL CARCINOMA: Primary | ICD-10-CM

## 2022-09-15 LAB
ALBUMIN SERPL-MCNC: 4.3 G/DL (ref 3.5–5.2)
ALBUMIN/GLOB SERPL: 1.9 G/DL
ALP SERPL-CCNC: 74 U/L (ref 39–117)
ALT SERPL W P-5'-P-CCNC: 22 U/L (ref 1–33)
ANION GAP SERPL CALCULATED.3IONS-SCNC: 8 MMOL/L (ref 5–15)
AST SERPL-CCNC: 12 U/L (ref 1–32)
BASOPHILS # BLD AUTO: 0.02 10*3/MM3 (ref 0–0.2)
BASOPHILS NFR BLD AUTO: 0.2 % (ref 0–1.5)
BILIRUB SERPL-MCNC: 0.4 MG/DL (ref 0–1.2)
BUN SERPL-MCNC: 21 MG/DL (ref 8–23)
BUN/CREAT SERPL: 30.4 (ref 7–25)
CALCIUM SPEC-SCNC: 9.2 MG/DL (ref 8.6–10.5)
CANCER AG125 SERPL QL: 11.1 U/ML (ref 0–38.1)
CHLORIDE SERPL-SCNC: 101 MMOL/L (ref 98–107)
CO2 SERPL-SCNC: 27 MMOL/L (ref 22–29)
CREAT SERPL-MCNC: 0.69 MG/DL (ref 0.57–1)
DEPRECATED RDW RBC AUTO: 44.1 FL (ref 37–54)
EGFRCR SERPLBLD CKD-EPI 2021: 97.7 ML/MIN/1.73
EOSINOPHIL # BLD AUTO: 0 10*3/MM3 (ref 0–0.4)
EOSINOPHIL NFR BLD AUTO: 0 % (ref 0.3–6.2)
ERYTHROCYTE [DISTWIDTH] IN BLOOD BY AUTOMATED COUNT: 13.1 % (ref 12.3–15.4)
GLOBULIN UR ELPH-MCNC: 2.3 GM/DL
GLUCOSE SERPL-MCNC: 92 MG/DL (ref 65–99)
HCT VFR BLD AUTO: 42.9 % (ref 34–46.6)
HGB BLD-MCNC: 13.8 G/DL (ref 12–15.9)
IMM GRANULOCYTES # BLD AUTO: 0.18 10*3/MM3 (ref 0–0.05)
IMM GRANULOCYTES NFR BLD AUTO: 1.9 % (ref 0–0.5)
LYMPHOCYTES # BLD AUTO: 0.44 10*3/MM3 (ref 0.7–3.1)
LYMPHOCYTES NFR BLD AUTO: 4.7 % (ref 19.6–45.3)
MAGNESIUM SERPL-MCNC: 2.1 MG/DL (ref 1.6–2.4)
MCH RBC QN AUTO: 29.2 PG (ref 26.6–33)
MCHC RBC AUTO-ENTMCNC: 32.2 G/DL (ref 31.5–35.7)
MCV RBC AUTO: 90.7 FL (ref 79–97)
MONOCYTES # BLD AUTO: 0.81 10*3/MM3 (ref 0.1–0.9)
MONOCYTES NFR BLD AUTO: 8.7 % (ref 5–12)
NEUTROPHILS NFR BLD AUTO: 7.85 10*3/MM3 (ref 1.7–7)
NEUTROPHILS NFR BLD AUTO: 84.5 % (ref 42.7–76)
NRBC BLD AUTO-RTO: 0 /100 WBC (ref 0–0.2)
PLATELET # BLD AUTO: 217 10*3/MM3 (ref 140–450)
PMV BLD AUTO: 9.9 FL (ref 6–12)
POTASSIUM SERPL-SCNC: 4.1 MMOL/L (ref 3.5–5.2)
PROT SERPL-MCNC: 6.6 G/DL (ref 6–8.5)
RBC # BLD AUTO: 4.73 10*6/MM3 (ref 3.77–5.28)
SODIUM SERPL-SCNC: 136 MMOL/L (ref 136–145)
WBC NRBC COR # BLD: 9.3 10*3/MM3 (ref 3.4–10.8)

## 2022-09-15 PROCEDURE — 86304 IMMUNOASSAY TUMOR CA 125: CPT

## 2022-09-15 PROCEDURE — 80053 COMPREHEN METABOLIC PANEL: CPT

## 2022-09-15 PROCEDURE — 25010000002 HEPARIN LOCK FLUSH PER 10 UNITS: Performed by: OBSTETRICS & GYNECOLOGY

## 2022-09-15 PROCEDURE — 96375 TX/PRO/DX INJ NEW DRUG ADDON: CPT

## 2022-09-15 PROCEDURE — 25010000002 CARBOPLATIN PER 50 MG: Performed by: INTERNAL MEDICINE

## 2022-09-15 PROCEDURE — 96417 CHEMO IV INFUS EACH ADDL SEQ: CPT

## 2022-09-15 PROCEDURE — 36415 COLL VENOUS BLD VENIPUNCTURE: CPT

## 2022-09-15 PROCEDURE — 25010000002 DEXAMETHASONE SODIUM PHOSPHATE 100 MG/10ML SOLUTION: Performed by: INTERNAL MEDICINE

## 2022-09-15 PROCEDURE — 96367 TX/PROPH/DG ADDL SEQ IV INF: CPT

## 2022-09-15 PROCEDURE — 85025 COMPLETE CBC W/AUTO DIFF WBC: CPT

## 2022-09-15 PROCEDURE — 83735 ASSAY OF MAGNESIUM: CPT

## 2022-09-15 PROCEDURE — 25010000002 PACLITAXEL PROTEIN-BOUND PART PER 1 MG: Performed by: INTERNAL MEDICINE

## 2022-09-15 PROCEDURE — 25010000002 FOSAPREPITANT PER 1 MG: Performed by: INTERNAL MEDICINE

## 2022-09-15 PROCEDURE — 25010000002 DIPHENHYDRAMINE PER 50 MG: Performed by: INTERNAL MEDICINE

## 2022-09-15 PROCEDURE — 25010000002 PALONOSETRON PER 25 MCG: Performed by: INTERNAL MEDICINE

## 2022-09-15 PROCEDURE — 96413 CHEMO IV INFUSION 1 HR: CPT

## 2022-09-15 RX ORDER — PACLITAXEL 100 MG/20ML
100 INJECTION, POWDER, LYOPHILIZED, FOR SUSPENSION INTRAVENOUS ONCE
Status: CANCELLED
Start: 2022-09-15

## 2022-09-15 RX ORDER — PACLITAXEL 100 MG/20ML
100 INJECTION, POWDER, LYOPHILIZED, FOR SUSPENSION INTRAVENOUS ONCE
Status: CANCELLED
Start: 2022-10-06

## 2022-09-15 RX ORDER — PACLITAXEL 100 MG/20ML
200 INJECTION, POWDER, LYOPHILIZED, FOR SUSPENSION INTRAVENOUS ONCE
Status: COMPLETED | OUTPATIENT
Start: 2022-09-15 | End: 2022-09-15

## 2022-09-15 RX ORDER — SODIUM CHLORIDE 0.9 % (FLUSH) 0.9 %
10 SYRINGE (ML) INJECTION AS NEEDED
Status: DISCONTINUED | OUTPATIENT
Start: 2022-09-15 | End: 2022-09-15 | Stop reason: HOSPADM

## 2022-09-15 RX ORDER — FAMOTIDINE 10 MG/ML
20 INJECTION, SOLUTION INTRAVENOUS ONCE
Status: CANCELLED | OUTPATIENT
Start: 2022-09-22 | End: 2022-09-20

## 2022-09-15 RX ORDER — DIPHENHYDRAMINE HYDROCHLORIDE 50 MG/ML
50 INJECTION INTRAMUSCULAR; INTRAVENOUS AS NEEDED
Status: CANCELLED | OUTPATIENT
Start: 2022-10-06

## 2022-09-15 RX ORDER — DIPHENHYDRAMINE HYDROCHLORIDE 50 MG/ML
50 INJECTION INTRAMUSCULAR; INTRAVENOUS ONCE
Status: CANCELLED | OUTPATIENT
Start: 2022-09-15 | End: 2022-09-15

## 2022-09-15 RX ORDER — PACLITAXEL 100 MG/20ML
100 INJECTION, POWDER, LYOPHILIZED, FOR SUSPENSION INTRAVENOUS ONCE
Status: CANCELLED
Start: 2022-09-22

## 2022-09-15 RX ORDER — SODIUM CHLORIDE 9 MG/ML
250 INJECTION, SOLUTION INTRAVENOUS ONCE
Status: CANCELLED | OUTPATIENT
Start: 2022-09-22

## 2022-09-15 RX ORDER — HEPARIN SODIUM (PORCINE) LOCK FLUSH IV SOLN 100 UNIT/ML 100 UNIT/ML
500 SOLUTION INTRAVENOUS AS NEEDED
Status: DISCONTINUED | OUTPATIENT
Start: 2022-09-15 | End: 2022-09-15 | Stop reason: HOSPADM

## 2022-09-15 RX ORDER — DIPHENHYDRAMINE HYDROCHLORIDE 50 MG/ML
50 INJECTION INTRAMUSCULAR; INTRAVENOUS ONCE
Status: DISCONTINUED | OUTPATIENT
Start: 2022-09-15 | End: 2022-09-15

## 2022-09-15 RX ORDER — FAMOTIDINE 10 MG/ML
20 INJECTION, SOLUTION INTRAVENOUS AS NEEDED
Status: CANCELLED | OUTPATIENT
Start: 2022-09-22

## 2022-09-15 RX ORDER — SODIUM CHLORIDE 9 MG/ML
250 INJECTION, SOLUTION INTRAVENOUS ONCE
Status: COMPLETED | OUTPATIENT
Start: 2022-09-15 | End: 2022-09-15

## 2022-09-15 RX ORDER — DIPHENHYDRAMINE HYDROCHLORIDE 50 MG/ML
50 INJECTION INTRAMUSCULAR; INTRAVENOUS AS NEEDED
Status: CANCELLED | OUTPATIENT
Start: 2022-09-15

## 2022-09-15 RX ORDER — FAMOTIDINE 10 MG/ML
20 INJECTION, SOLUTION INTRAVENOUS ONCE
Status: COMPLETED | OUTPATIENT
Start: 2022-09-15 | End: 2022-09-15

## 2022-09-15 RX ORDER — DIPHENHYDRAMINE HYDROCHLORIDE 50 MG/ML
50 INJECTION INTRAMUSCULAR; INTRAVENOUS AS NEEDED
Status: DISCONTINUED | OUTPATIENT
Start: 2022-09-15 | End: 2022-09-15 | Stop reason: HOSPADM

## 2022-09-15 RX ORDER — PALONOSETRON 0.05 MG/ML
0.25 INJECTION, SOLUTION INTRAVENOUS ONCE
Status: COMPLETED | OUTPATIENT
Start: 2022-09-15 | End: 2022-09-15

## 2022-09-15 RX ORDER — HEPARIN SODIUM (PORCINE) LOCK FLUSH IV SOLN 100 UNIT/ML 100 UNIT/ML
500 SOLUTION INTRAVENOUS AS NEEDED
Status: CANCELLED | OUTPATIENT
Start: 2022-09-15

## 2022-09-15 RX ORDER — FAMOTIDINE 10 MG/ML
20 INJECTION, SOLUTION INTRAVENOUS ONCE
Status: CANCELLED | OUTPATIENT
Start: 2022-10-06 | End: 2022-09-27

## 2022-09-15 RX ORDER — PALONOSETRON 0.05 MG/ML
0.25 INJECTION, SOLUTION INTRAVENOUS ONCE
Status: CANCELLED | OUTPATIENT
Start: 2022-09-15

## 2022-09-15 RX ORDER — FAMOTIDINE 10 MG/ML
20 INJECTION, SOLUTION INTRAVENOUS ONCE
Status: CANCELLED | OUTPATIENT
Start: 2022-09-15 | End: 2022-09-15

## 2022-09-15 RX ORDER — FAMOTIDINE 10 MG/ML
20 INJECTION, SOLUTION INTRAVENOUS AS NEEDED
Status: CANCELLED | OUTPATIENT
Start: 2022-10-06

## 2022-09-15 RX ORDER — DIPHENHYDRAMINE HYDROCHLORIDE 50 MG/ML
50 INJECTION INTRAMUSCULAR; INTRAVENOUS ONCE
Status: CANCELLED | OUTPATIENT
Start: 2022-09-27 | End: 2022-09-27

## 2022-09-15 RX ORDER — FAMOTIDINE 10 MG/ML
20 INJECTION, SOLUTION INTRAVENOUS AS NEEDED
Status: DISCONTINUED | OUTPATIENT
Start: 2022-09-15 | End: 2022-09-15 | Stop reason: HOSPADM

## 2022-09-15 RX ORDER — SODIUM CHLORIDE 9 MG/ML
250 INJECTION, SOLUTION INTRAVENOUS ONCE
Status: CANCELLED | OUTPATIENT
Start: 2022-10-06

## 2022-09-15 RX ORDER — DIPHENHYDRAMINE HYDROCHLORIDE 50 MG/ML
50 INJECTION INTRAMUSCULAR; INTRAVENOUS ONCE
Status: CANCELLED | OUTPATIENT
Start: 2022-09-20 | End: 2022-09-20

## 2022-09-15 RX ORDER — DIPHENHYDRAMINE HYDROCHLORIDE 50 MG/ML
50 INJECTION INTRAMUSCULAR; INTRAVENOUS AS NEEDED
Status: CANCELLED | OUTPATIENT
Start: 2022-09-22

## 2022-09-15 RX ORDER — FAMOTIDINE 10 MG/ML
20 INJECTION, SOLUTION INTRAVENOUS AS NEEDED
Status: CANCELLED | OUTPATIENT
Start: 2022-09-15

## 2022-09-15 RX ORDER — SODIUM CHLORIDE 9 MG/ML
250 INJECTION, SOLUTION INTRAVENOUS ONCE
Status: CANCELLED | OUTPATIENT
Start: 2022-09-15

## 2022-09-15 RX ORDER — SODIUM CHLORIDE 0.9 % (FLUSH) 0.9 %
10 SYRINGE (ML) INJECTION AS NEEDED
Status: CANCELLED | OUTPATIENT
Start: 2022-09-15

## 2022-09-15 RX ADMIN — Medication 10 ML: at 13:29

## 2022-09-15 RX ADMIN — SODIUM CHLORIDE 150 MG: 9 INJECTION, SOLUTION INTRAVENOUS at 10:58

## 2022-09-15 RX ADMIN — SODIUM CHLORIDE 250 ML: 9 INJECTION, SOLUTION INTRAVENOUS at 10:37

## 2022-09-15 RX ADMIN — DEXAMETHASONE SODIUM PHOSPHATE 20 MG: 10 INJECTION, SOLUTION INTRAMUSCULAR; INTRAVENOUS at 10:39

## 2022-09-15 RX ADMIN — PACLITAXEL 200 MG: 100 INJECTION, POWDER, LYOPHILIZED, FOR SUSPENSION INTRAVENOUS at 11:58

## 2022-09-15 RX ADMIN — PALONOSETRON HYDROCHLORIDE 0.25 MG: 0.25 INJECTION, SOLUTION INTRAVENOUS at 10:41

## 2022-09-15 RX ADMIN — CARBOPLATIN 540 MG: 10 INJECTION, SOLUTION INTRAVENOUS at 12:46

## 2022-09-15 RX ADMIN — HEPARIN 500 UNITS: 100 SYRINGE at 13:30

## 2022-09-15 RX ADMIN — DIPHENHYDRAMINE HYDROCHLORIDE 50 MG: 50 INJECTION, SOLUTION INTRAMUSCULAR; INTRAVENOUS at 11:33

## 2022-09-15 RX ADMIN — FAMOTIDINE 20 MG: 10 INJECTION INTRAVENOUS at 10:37

## 2022-09-15 NOTE — PROGRESS NOTES
TREVOR met with Mrs. Sierra who is here to start a new treatment for endometrial carcinoma. TREVOR introduced self and explained role and source of support. She is 61 years old and lives with her spouse. She has a strong support system. She does not have any transportation or financial concerns. She does not take any medication for anxiety or depression and does not see a counselor. No needs noted. TREVOR encouraged her to call if assistance is needed in the future.

## 2022-09-15 NOTE — TELEPHONE ENCOUNTER
Patient here for chemo education for change in treatment from Taxol and Carboplatin to Abraxane day 1, 8, 15 and Carboplatin on day 1 of each 21 day cycle.  Discussed possible side effects: hair loss, taste changes, edema, decreased in WBC and RBC counts, diarrhea, joint and muscle pain, rash, fatigue and weakness, increase in liver functions, mouth sores, fever, decreased appetite, neuropathy.  Discussed with her to avoid eating grapefruit and drinking grapefruit juice may increase concentrations of Abraxane.  Instructed Rachel to call if she has any problems or questions.

## 2022-09-17 NOTE — PROGRESS NOTES
"[facility] HEMATOLOGY & ONCOLOGY  [address]  [place] [address] [phone]    Patient Name: Rachel Sierra  Encounter Date: 09/21/2022  YOB: 1960  Patient Number: [id number]     REASON FOR VISIT: Rachel Sierra is a 61-year-old female who returns in follow-up of stage FIGO IA endometrial carcinoma.  She underwent robotic assisted total laparoscopic hysterectomy, bilateral salpingo-oophorectomy, sentinel lymph node mapping, 04/07/2022.  She was receiving adjuvant carboplatin+ paclitaxel, cycle 3 on 06/30/2022 and radiation in \"sandwich\" fashion beginning 07/19/2022-08/30/2022 (28/28 fx). C4 paclitaxel infusion stopped due to chest pain.  Resumed C4D1, 09/15/2022 with alternative Abraxane+carboplatin.  She is here alone (previously with her spouse, Pablo).     I have reviewed the HPI and verified with the patient the accuracy of it. No changes to interval history since the information was documented. Js Lima MD 09/21/22     Diagnostic abnormalities:  --02/24/2022- transvaginal ultrasound--uterus 10 cm length, 3.9 cm fundal fibroid.  Endometrial thickness 27 mm, ovaries normal.  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  --03/07/2022- BRCA 1/2: Negative-no clinically significant variants identified  --03/17/2022-mammogram-persistent nodular densities within the right retroareolar and left medial breast corresponding with benign cysts versus ductal ectasia in the medial left breast.  Finding consistent with benign process.  Return to annual screening mammography recommended, March 2023.  BI-RADS 2 benign exam.  --04/06/2022- BMP normal.  CBC normal.  --04/27/2022 --consult by Dr. Miller.  Recommends treatment to the pelvis with curative radiation therapy, anticipate 4500 cGy over 25-28 fractions, final course pending completion of " "planning.  In addition recommend 3 HDR brachytherapy fractions, final course pending.  --05/05/2022-- CT chest-no acute abnormality no evidence of neoplastic disease within the chest.  -- 05/05/2022-CT abdomen/pelvis--3-4 cm soft tissue \"mass\" at the left pelvic sidewall for which neoplastic lymphadenopathy is favored given the history of endometrial carcinoma. Mildly dilated right ureter extending into the upper pelvis where there appears to be a focus of scarring. There is no high-grade ureteral obstruction. Addendum: This examination is reviewed with and discussed with Dr. Miller. The previously described 3-4 cm soft tissue mass along the left pelvic  sidewall is seen to have fluid density. The low-density and discrete margins support the idea that this represents a lymphocele rather than neoplastic lymphadenopathy. Ultrasound evaluation is planned to confirm this. If ultrasound is not helpful pre and postcontrast pelvic MRI should be  Considered.  -- 05/17/2022- ultrasound pelvis-Reidentified LEFT iliac chain collection/lesion measuring 3.0 x 3.7 x 2.7 cm. There are some low-level internal echoes within this lesion. No definite internal vascularity. Favor this to represent a postoperative lymphocele, especially given recent niecy dissection in this region. However this area is incompletely characterized by ultrasound and continued follow-up is recommended to confirm stability or resolution. Alternatively, MRI pelvis would provide better characterization.    Previous interventions:  --04/07/2022- robotic assisted total laparoscopic hysterectomy, bilateral salpingo-oophorectomy, sentinel lymph node mapping by Dr. Jay.  --Final diagnosis:1. Uterus, Cervix, Bilateral Fallopian Tubes and Ovaries, Hysterectomy with Bilateral Salpingo-oophorectomy         (236 grams): High-grade endometrial carcinoma.               A. Endometrium:                           1. High-grade serous carcinoma.                          " "2. The tumor measures 4.2 x 3.5 x 1.5 cm.                          3. The tumor extends into the myometrium a total of 6.25 mm out of a total thickness of 13 mm       (48%)  4. No definitive capillary lymphatic invasion is identified.   5. No definitive perineural invasion is seen.  6. Adenomyosis is identified with no definitive tumor involving the adenomyosis.  7. Parametrium not involved.              8. The tumor extends into the lower uterine segment but does not invade the stroma.  B. Benign cervix with               1. Nabothian cysts.  C. Benign parametria.  D. Benign right fallopian tube.  E. Benign right ovary.  F. Benign left fallopian tube.  G. Benign ovary.              H. Benign serosa.   2. Lymph Nodes, Right Obturator [place] Lymph Node, Excision:                A. Three benign lymph nodes.   3. Lymph Nodes, Left Obturator [place] Lymph Node, Excision:               A. Three benign lymph nodes.     -- Adjuvant carboplatin+ paclitaxel:  C1, 05/19/2022; C2, 06/09/2022; C3, 06/30/2022; C4, 09/06/2022-infusion stopped due to chest pain  -- \"sandwich\" treatment to the pelvis with curative radiation therapy, beginning 07/19/2022 -08/30/3033 (4500 cGy in 28 fractions).  In addition recommend 3 HDR brachytherapy fractions, final course pending.      LABS    Lab Results - Last 18 Months   Lab Units 09/21/22  0942 09/15/22  0931 09/06/22  0920 08/10/22  0754 07/07/22  0827 06/30/22  0733 06/23/22  1418 06/16/22  1423 06/06/22  0846 06/02/22  1424 05/26/22  1407   HEMOGLOBIN g/dL 13.7 13.8 12.8 13.6 12.2 12.8 12.7 13.1 13.7   < > 12.7   HEMATOCRIT % 42.4 42.9 37.6 41.7 37.4 38.7 38.2 40.5 41.9   < > 39.3   MCV fL 90.8 90.7 88.9 90.8 89.0 86.8 86.4 88.0 85.3   < > 86.2   WBC 10*3/mm3 3.14* 9.30 8.41 4.00 6.13 8.66 8.38 13.26* 4.99   < > 12.78*   RDW % 12.5 13.1 13.3 15.8* 17.6* 18.1* 16.9* 16.3* 15.5*   < > 14.6   MPV fL 9.9 9.9 9.9 9.3 10.4 9.4 10.1 10.8 10.4   < > 10.6   PLATELETS 10*3/mm3 152 217 224 177 " 159 292 76* 206 258   < > 168   IMM GRAN % % 1.0* 1.9* 1.0* 0.3  --  0.6*  --   --  1.0*  --   --    NEUTROS ABS 10*3/mm3 2.80 7.85* 7.59* 2.86 4.02 7.09* 5.62 7.62* 2.94   < > 7.00   LYMPHS ABS 10*3/mm3 0.19* 0.44* 0.30* 0.44*  --  1.11 1.89  --  1.50   < >  --    MONOS ABS 10*3/mm3 0.11 0.81 0.43 0.39  --  0.39 0.44  --  0.39   < >  --    EOS ABS 10*3/mm3 0.00 0.00 0.00 0.26 0.12 0.00 0.11 0.15 0.06   < > 0.41*   BASOS ABS 10*3/mm3 0.01 0.02 0.01 0.04 0.06 0.02 0.08  --  0.05   < >  --    IMMATURE GRANS (ABS) 10*3/mm3 0.03 0.18* 0.08* 0.01  --  0.05  --   --  0.05  --   --    NRBC /100 WBC 0.0 0.0 0.0 0.0  --  0.0  --   --  0.0  --   --    NEUTROPHIL % %  --   --   --   --  61.6  --   --  45.7  --   --  42.1*   MONOCYTES % %  --   --   --   --  11.1  --   --  16.0*  --   --  13.7*   BASOPHIL % %  --   --   --   --  1.0  --   --   --   --   --   --    ATYP LYMPH % %  --   --   --   --  5.1*  --   --  2.1  --   --  6.3*    < > = values in this interval not displayed.       Lab Results - Last 18 Months   Lab Units 09/21/22  0942 09/15/22  0931 09/06/22  0920 08/10/22  0754 07/07/22  0827 06/30/22  0733   GLUCOSE mg/dL 120* 92 104* 107* 116* 138*   SODIUM mmol/L 134* 136 139 139 138 139   POTASSIUM mmol/L 4.3 4.1 3.7 4.2 4.4 3.9   CO2 mmol/L 25.0 27.0 27.0 29.0 29.0 25.0   CHLORIDE mmol/L 99 101 105 102 101 103   ANION GAP mmol/L 10.0 8.0 7.0 8.0 8.0 11.0   CREATININE mg/dL 0.66 0.69 0.68 0.65 0.65 0.74   BUN mg/dL 20 21 15 14 15 18   BUN / CREAT RATIO  30.3* 30.4* 22.1 21.5 23.1 24.3   CALCIUM mg/dL 9.6 9.2 9.3 9.6 10.1 9.8   ALK PHOS U/L 77 74 70 79 119* 89   TOTAL PROTEIN g/dL 6.8 6.6 6.4 6.7 6.1 6.8   ALT (SGPT) U/L 23 22 18 23 29 27   AST (SGOT) U/L 16 12 16 20 21 18   BILIRUBIN mg/dL 0.5 0.4 0.4 0.2 0.3 0.4   ALBUMIN g/dL 4.60 4.30 4.30 4.20 3.90 4.20   GLOBULIN gm/dL 2.2 2.3 2.1 2.5 2.2 2.6       No results for input(s): MSPIKE, KAPPALAMB, IGLFLC, URICACID, FREEKAPPAL, CEA, LDH, REFLABREPO in the last 88196  hours.    No results for input(s): IRON, TIBC, LABIRON, FERRITIN, Q3WCDUI, TSH, FOLATE in the last 10040 hours.    Invalid input(s): VITB12      PAST MEDICAL HISTORY:  ALLERGIES:  Allergies   Allergen Reactions   • Honey Anaphylaxis     Anaphylaxis as a child - edema at lips as adult.   • Paclitaxel Unknown - High Severity     Chest pain   • Sulfa Antibiotics Rash and Swelling     Edema throat, hands and face -  Hospitalized for 2 days.   • Bupropion Hives   • Nuts Other (See Comments)     Blood in stools     CURRENT MEDICATIONS:  Outpatient Encounter Medications as of 9/21/2022   Medication Sig Dispense Refill   • dexamethasone (DECADRON) 4 MG tablet Start premedication 2 days prior to planned treatment, take one tablet by mouth twice daily x 2 days before txt 40 tablet 0   • diphenhydrAMINE (BENADRYL) 25 mg capsule Take 25 mg by mouth Every 6 (Six) Hours As Needed for Itching or Allergies.     • diphenoxylate-atropine (LOMOTIL) 2.5-0.025 MG per tablet Take 2 tablets po with first loose bowel movement and then 1 tablet po with each loose stool with max of 6 tablets per day 60 tablet 1   • famotidine (PEPCID) 40 MG tablet Take one tablet by mouth once daily 30 tablet 2   • Hydrocortisone, Perianal, (ANUSOL-HC) 2.5 % rectal cream Insert  into the rectum 2 (Two) Times a Day. 28 g 3   • IBUPROFEN PO Take  by mouth As Needed.     • lidocaine-prilocaine (EMLA) 2.5-2.5 % cream 30 minutes prior to access apply generous amount to port site area and cover with clear plastic wrap until ready to access 1 each 2   • ondansetron (ZOFRAN) 8 MG tablet Take 1 tablet by mouth Every 8 (Eight) Hours As Needed for Nausea or Vomiting. 30 tablet 0     No facility-administered encounter medications on file as of 9/21/2022.     Adult illnesses:  Endometrial carcinoma  Seasonal allergies    Past surgeries:  Left breast biopsy-lipoma  Cholecystectomy  Endometrial biopsy, 2015  Multiple lipoma excisions  Tonsillectomy  Endometrial biopsy,  02/24/2022  Robotic assisted total laparoscopic hysterectomy/bilateral salpingo-oophorectomy/sentinel lymph node mapping, vaginal laceration repair, 04/17/2022-Dr. Jay  Left subclavian Mediport, single-lumen, 05/09/2022- Dr. De La Cruz      ADULT ILLNESSES:  Patient Active Problem List   Diagnosis Code   • Uterine cancer (HCC) C55   • BMI 36.0-36.9,adult Z68.36   • Endometrial carcinoma (HCC) C54.1   • Fitting and adjustment of vascular catheter Z45.2   • Non-smoker Z78.9   • S/P KIMMY-BSO (total abdominal hysterectomy and bilateral salpingo-oophorectomy) Z90.710, Z90.722, Z90.79   • Diarrhea R19.7   • Nausea R11.0     SURGERIES:  Past Surgical History:   Procedure Laterality Date   • BREAST BIOPSY Left     lipoma   • CHOLECYSTECTOMY     • ENDOMETRIAL BIOPSY  2015   • LIPOMA EXCISION      MULTIPLE   • TONSILLECTOMY     • TOTAL LAPAROSCOPIC HYSTERECTOMY N/A 4/7/2022    Procedure: Robotic assisted total laparoscopic hysterectomy, bilateral salpingoophorecotmy, sentinel lymph node mapping.;  Surgeon: Adela Jay DO;  Location: [facility]  Service: Robotics - [facility];  Laterality: N/A;   • VENOUS ACCESS DEVICE (PORT) INSERTION N/A 5/9/2022    Procedure: SINGLE LUMEN PORT PLACEMENT;  Surgeon: Flora De La Cruz MD;  Location: [facility] OR;  Service: General;  Laterality: N/A;     HEALTH MAINTENANCE ITEMS:  Health Maintenance Due   Topic Date Due   • COLORECTAL CANCER SCREENING  Never done   • ANNUAL PHYSICAL  Never done   • Pneumococcal Vaccine 0-64 (1 - PCV) Never done   • ZOSTER VACCINE (1 of 2) Never done   • COVID-19 Vaccine (3 - Moderna risk series) 12/29/2021       <no information>  Last Completed Colonoscopy     This patient has no relevant Health Maintenance data.        Immunization History   Administered Date(s) Administered   • COVID-19 (MODERNA) 1st, 2nd, 3rd Dose Only 03/23/2021, 04/20/2021, 12/01/2021   • COVID-19 (MODERNA) BOOSTER 12/01/2021   • Flu Vaccine Split Quad 01/05/2009   • FluLaval/Fluzone  ">6mos 12/01/2021   • Influenza, Unspecified 12/01/2021   • TD Preservative Free 01/04/2008   • Tdap 08/16/2017     Last Completed Mammogram          MAMMOGRAM (Every 2 Years) Next due on 3/17/2024    03/17/2022  Mammo Diagnostic Digital Tomosynthesis Bilateral With CAD    03/01/2022  Mammo Screening Digital Tomosynthesis Bilateral With CAD    08/23/2017  Done                  FAMILY HISTORY:  Family History   Problem Relation Age of Onset   • Lung cancer Mother    • Anemia Father    • Breast cancer Father    • Melanoma Sister    • Ovarian cancer Neg Hx    • Colon cancer Neg Hx    • Malig Hyperthermia Neg Hx      SOCIAL HISTORY:  Social History     Socioeconomic History   • Marital status:    Tobacco Use   • Smoking status: Never Smoker   • Smokeless tobacco: Never Used   Vaping Use   • Vaping Use: Never used   Substance and Sexual Activity   • Alcohol use: Yes     Alcohol/week: 1.0 standard drink     Types: 1 Cans of beer per week     Comment: occ.   • Drug use: Never   • Sexual activity: Yes     Partners: Male     Birth control/protection: None       REVIEW OF SYSTEMS:  Review of Systems   Constitutional: Negative for chills, diaphoresis (after surgery for ~ 2 weeks, \"gone.\"), fatigue (\"after surgery but better\") and fever.        Manages her ADLs, to include some chores, errands and driving.  Is up and about, \"most of the time yeah.\"     Chemotherapy tolerance: No chest pain with Abraxane.  Taste changes.  \"Nothing tastes good.\" No mouth sores.  No nausea.  No vomiting.  No skin changes.  No neuropathy.  No loose stools and no diarrhea.     HENT: Positive for postnasal drip.    Eyes: Negative.    Respiratory: Negative.    Cardiovascular: Negative.    Gastrointestinal: Negative for diarrhea (None since completion of RT).   Endocrine: Negative.  Negative for heat intolerance.   Genitourinary: Positive for urinary incontinence (Uses pads.  \"Better with exercises.\"). Negative for dysuria, frequency, hematuria, " "pelvic pain and vaginal bleeding.        \"Vaginal spotting\" post-op resolved   Musculoskeletal: Negative.    Skin: Negative.    Allergic/Immunologic: Positive for environmental allergies (\"Hayfever\").        Allergic to sulfa   Neurological: Negative.    Hematological: Negative.    Psychiatric/Behavioral: Negative.        /82   Pulse 68   Temp 97 °F (36.1 °C) (Temporal)   Resp 18   Ht 165.1 cm (65\")   Wt 92.9 kg (204 lb 11.2 oz)   SpO2 98%   Breastfeeding No   BMI 34.06 kg/m²  Body surface area is 2 meters squared.  Pain Score    09/21/22 1003   PainSc: 0-No pain       Physical Exam:  Physical Exam  Vitals reviewed.   Constitutional:       Appearance: She is obese.      Comments: Pleasant, cooperative, obese, modestly kept female.  Ambulatory.  ECOG 0.      She has lost 9 lb (had gained 2 lb at her prior visit) since her last visit    HENT:      Head: Normocephalic and atraumatic.      Mouth/Throat:      Comments: Is wearing a surgical mask today  Eyes:      General: No scleral icterus.     Extraocular Movements: Extraocular movements intact.      Pupils: Pupils are equal, round, and reactive to light.   Cardiovascular:      Rate and Rhythm: Normal rate.   Pulmonary:      Effort: Pulmonary effort is normal.      Comments: Port in the left upper chest is well-seated  Abdominal:      Palpations: Abdomen is soft.      Tenderness: There is no abdominal tenderness.      Comments: Laparoscopy incisions are all well approximated and healing nicely.   Musculoskeletal:         General: Normal range of motion.      Cervical back: Normal range of motion.   Lymphadenopathy:      Cervical: No cervical adenopathy.   Skin:     General: Skin is warm.   Neurological:      General: No focal deficit present.      Mental Status: She is alert and oriented to person, place, and time.   Psychiatric:         Mood and Affect: Mood normal.         Behavior: Behavior normal.         Thought Content: Thought content normal. "           Assessment:  1.    Serous carcinoma of the endometrium  · Stage: FIGO IA (pT1a, pN0(sn), pM0).    · Tumor [place]: The tumor measures 4.2 x 3.5 x 1.5 cm. Extends into the myometrium a total of 6.25 mm out of a total thickness of 13 mm (48%).  Extends into the lower uterine segment but does not invade the stroma. 6 regional lymph nodes negative for tumor cells   · Complications of Tumor: Postmenopausal bleeding  · Mismatch repair (MMR) proteins: Pending  · p16: strong (+)  · Pax8:  strong (+)  · Her2: 1+  · ER/PA:(-)/(+) patchy  · Tumor Status:-- 04/07/2022- robotic assisted total laparoscopic hysterectomy, bilateral salpingo-oophorectomy, sentinel lymph node mapping by Dr. Jay.  · : 13.1, 06/02/2022 (prior: 37.1)  · Adjuvant carboplatin and Taxol in progress- Taxol stopped on C4, 09/06/2022 due to chest pain   · Adjuvant radiation (EBRT- completed 08/30/2022- 4500 cGy/28 fx).  HDR brachytherapy pending?  · Adjuvant Abraxane +carboplatin initiated, N8I0-57/15/2022     2.    Thrombocytopenia.  Chemo effect  --217,000, 09/15/2022 (prior range: 76,000-494,000)  3.    Mild weight loss due to dysgeusia from chemo  4.    Taxol intolerance     Plan:  1.  Apprised of labs, 09/15/2022 with normal CMP (resolution of elevated alk phos/ALT/AST), normal  (11.1 -prior: 11.3-37.1), normal CBC.  Chemo tolerance discussed.  Taxol associated chest pains.  None with Abraxane. Mild fatigue.  Taste changes.  No other overt toxicities.    2.  Previously apprised of CT, 05/02/2022 (above) and pelvic ultrasound, 05/17/2022 (above).  No metastatic disease to the chest.  Left iliac chain collection/lesion.  Favor postoperative lymphocele especially given recent niecy dissection.  Continued follow-up recommended vs MRI.  3.  Review NCCN guidelines version 1.2022 endometrial carcinoma-biopsy findings: Serous carcinoma following primary treatment with KIMMY/BSO, surgical staging and maximal tumor debulking.  For invasive  stage IA-additional treatment: Systemic therapy (preferred regimens: Carboplatin/paclitaxel- primary adjuvant treatment with use for uterine confined high risk disease) +/-EBRT+/-vaginal brachytherapy-followed by surveillance: Physical exam every 3-6 months for 2-3 years, then every 6 months for up to 5 years then annually.  CA-125 if initially elevated.  Imaging as clinically indicated (abdominal/pelvic and/or chest CT recommended based upon symptoms or physical exam findings).  4.  Toxicities of chemotherapy previously noted. CARBOPLATIN (to include, but not limited to: Myelosuppression thrombocytopenia, anemia, leukopenia, and neutropenia, nausea, vomiting, electrolyte abnormalities, liver enzymes abnormalities, nephrotoxicity, O2 toxicity, neuropathy, pain, asthenia, paresthesias, abdominal pain, diarrhea, constipation, mucositis, bleeding, alopecia, vision loss, anaphylactoid reactions) and ABRAXANE (to include, but not limited to: Myelosuppression (neutropenia, anemia, thrombocytopenia), infection, sepsis, febrile neutropenia, sensory neuropathy, cranial nerve palsy, viral transmission risk, hypersensitivity reaction, skin reaction, injection site reaction, hypotension, bradycardia, SVT, myocardial ischemia, MI, cardiac arrest, stroke, TIA, thromboembolism, pneumonitis, pulmonary fibrosis, pneumothorax, GI perforation/obstruction, paralytic ileus, pancreatitis, ischemic colitis, neutropenic colitis, hepatotoxicity, tumor lysis syndrome, alopecia, abnormal EKG, fatigue, asthenia, arthralgias, myalgias, hepatotoxicity, nausea, diarrhea, visual disturbance, vomiting, dyspnea, edema, fluid retention, renal insufficiency, mucositis, cough, hypotension, bleeding). Questions answered to the patient's apparent satisfaction. She agrees to press on with therapy.     5. Schedule (Plan: Every 21 days x6 cycles) - confirm RT has been completed - C5, 10/06/2022; 10/27/2022 at [facility].      · Carboplatin AUC of 6 (Total Dose =  pending) every 3 weeks   · Abraxane mg/m2 (BSA = ; Total Dose =  mg ) D1,8,15 every 3 weeks     6.  Premeds:   · Aloxi 0.25 mg IV   · Emend 150 mg IV   · Decadron 10 mg IV   · Pepcid 20 mg IV   · Benadryl 25 mg IV     7.   Neulasta 6 mg sc on day 2 of chemo at [facility].   8.   CMP, magnesium level, and CBC with differential weekly and on day of chemotherapy. Procrit 40,000 units subcutaneously if hemoglobin less than 10 and hematocrit less than 30. Zarxio/Neupogen 480 mcg daily x3 if ANC less than 1.0.     9.   Rx:  Zofran 8 mg po tid prn # 30    10.  Continue other currently identified medications.   11.  Review consult, 04/27/2022 by Dr. Miller.  Recommends treatment to the pelvis with curative radiation therapy, anticipate 4500 cGy over 25-28 fractions, final course pending completion of planning.  In addition recommend 3 HDR brachytherapy fractions, final course pending.  12.  Return to the office in 6 weeks with pre-office CBC with differential, CMP, .      I spent ~ 40 minutes caring for Rachel on this date of service. This time includes time spent by me in the following activities: preparing for the visit, reviewing tests, performing a medically appropriate examination and/or evaluation, counseling and educating the patient/family/caregiver, ordering medications, tests, or procedures and documenting information in the medical record

## 2022-09-21 ENCOUNTER — OFFICE VISIT (OUTPATIENT)
Dept: ONCOLOGY | Facility: CLINIC | Age: 62
End: 2022-09-21

## 2022-09-21 ENCOUNTER — LAB (OUTPATIENT)
Dept: LAB | Facility: HOSPITAL | Age: 62
End: 2022-09-21

## 2022-09-21 VITALS
BODY MASS INDEX: 34.1 KG/M2 | WEIGHT: 204.7 LBS | OXYGEN SATURATION: 98 % | DIASTOLIC BLOOD PRESSURE: 82 MMHG | RESPIRATION RATE: 18 BRPM | HEART RATE: 68 BPM | SYSTOLIC BLOOD PRESSURE: 132 MMHG | HEIGHT: 65 IN | TEMPERATURE: 97 F

## 2022-09-21 DIAGNOSIS — C54.1 ENDOMETRIAL CARCINOMA: Primary | ICD-10-CM

## 2022-09-21 LAB
ALBUMIN SERPL-MCNC: 4.6 G/DL (ref 3.5–5.2)
ALBUMIN/GLOB SERPL: 2.1 G/DL
ALP SERPL-CCNC: 77 U/L (ref 39–117)
ALT SERPL W P-5'-P-CCNC: 23 U/L (ref 1–33)
ANION GAP SERPL CALCULATED.3IONS-SCNC: 10 MMOL/L (ref 5–15)
AST SERPL-CCNC: 16 U/L (ref 1–32)
BASOPHILS # BLD AUTO: 0.01 10*3/MM3 (ref 0–0.2)
BASOPHILS NFR BLD AUTO: 0.3 % (ref 0–1.5)
BILIRUB SERPL-MCNC: 0.5 MG/DL (ref 0–1.2)
BUN SERPL-MCNC: 20 MG/DL (ref 8–23)
BUN/CREAT SERPL: 30.3 (ref 7–25)
CALCIUM SPEC-SCNC: 9.6 MG/DL (ref 8.6–10.5)
CHLORIDE SERPL-SCNC: 99 MMOL/L (ref 98–107)
CO2 SERPL-SCNC: 25 MMOL/L (ref 22–29)
CREAT SERPL-MCNC: 0.66 MG/DL (ref 0.57–1)
DEPRECATED RDW RBC AUTO: 41.1 FL (ref 37–54)
EGFRCR SERPLBLD CKD-EPI 2021: 98.7 ML/MIN/1.73
EOSINOPHIL # BLD AUTO: 0 10*3/MM3 (ref 0–0.4)
EOSINOPHIL NFR BLD AUTO: 0 % (ref 0.3–6.2)
ERYTHROCYTE [DISTWIDTH] IN BLOOD BY AUTOMATED COUNT: 12.5 % (ref 12.3–15.4)
GLOBULIN UR ELPH-MCNC: 2.2 GM/DL
GLUCOSE SERPL-MCNC: 120 MG/DL (ref 65–99)
HCT VFR BLD AUTO: 42.4 % (ref 34–46.6)
HGB BLD-MCNC: 13.7 G/DL (ref 12–15.9)
HOLD SPECIMEN: NORMAL
IMM GRANULOCYTES # BLD AUTO: 0.03 10*3/MM3 (ref 0–0.05)
IMM GRANULOCYTES NFR BLD AUTO: 1 % (ref 0–0.5)
LYMPHOCYTES # BLD AUTO: 0.19 10*3/MM3 (ref 0.7–3.1)
LYMPHOCYTES NFR BLD AUTO: 6.1 % (ref 19.6–45.3)
MAGNESIUM SERPL-MCNC: 2 MG/DL (ref 1.6–2.4)
MCH RBC QN AUTO: 29.3 PG (ref 26.6–33)
MCHC RBC AUTO-ENTMCNC: 32.3 G/DL (ref 31.5–35.7)
MCV RBC AUTO: 90.8 FL (ref 79–97)
MONOCYTES # BLD AUTO: 0.11 10*3/MM3 (ref 0.1–0.9)
MONOCYTES NFR BLD AUTO: 3.5 % (ref 5–12)
NEUTROPHILS NFR BLD AUTO: 2.8 10*3/MM3 (ref 1.7–7)
NEUTROPHILS NFR BLD AUTO: 89.1 % (ref 42.7–76)
NRBC BLD AUTO-RTO: 0 /100 WBC (ref 0–0.2)
PLATELET # BLD AUTO: 152 10*3/MM3 (ref 140–450)
PMV BLD AUTO: 9.9 FL (ref 6–12)
POTASSIUM SERPL-SCNC: 4.3 MMOL/L (ref 3.5–5.2)
PROT SERPL-MCNC: 6.8 G/DL (ref 6–8.5)
RBC # BLD AUTO: 4.67 10*6/MM3 (ref 3.77–5.28)
SODIUM SERPL-SCNC: 134 MMOL/L (ref 136–145)
WBC NRBC COR # BLD: 3.14 10*3/MM3 (ref 3.4–10.8)

## 2022-09-21 PROCEDURE — 99215 OFFICE O/P EST HI 40 MIN: CPT | Performed by: INTERNAL MEDICINE

## 2022-09-21 PROCEDURE — 85025 COMPLETE CBC W/AUTO DIFF WBC: CPT

## 2022-09-21 PROCEDURE — 80053 COMPREHEN METABOLIC PANEL: CPT

## 2022-09-21 PROCEDURE — 83735 ASSAY OF MAGNESIUM: CPT

## 2022-09-21 PROCEDURE — 36415 COLL VENOUS BLD VENIPUNCTURE: CPT

## 2022-09-21 RX ORDER — PALONOSETRON 0.05 MG/ML
0.25 INJECTION, SOLUTION INTRAVENOUS ONCE
Status: CANCELLED | OUTPATIENT
Start: 2022-09-22

## 2022-09-21 RX ORDER — ONDANSETRON HYDROCHLORIDE 8 MG/1
8 TABLET, FILM COATED ORAL EVERY 8 HOURS PRN
Qty: 30 TABLET | Refills: 0 | Status: SHIPPED | OUTPATIENT
Start: 2022-09-21 | End: 2023-02-07

## 2022-09-22 ENCOUNTER — APPOINTMENT (OUTPATIENT)
Dept: LAB | Facility: HOSPITAL | Age: 62
End: 2022-09-22

## 2022-09-22 ENCOUNTER — INFUSION (OUTPATIENT)
Dept: ONCOLOGY | Facility: HOSPITAL | Age: 62
End: 2022-09-22

## 2022-09-22 VITALS
BODY MASS INDEX: 35.16 KG/M2 | TEMPERATURE: 97.4 F | HEART RATE: 48 BPM | SYSTOLIC BLOOD PRESSURE: 116 MMHG | WEIGHT: 211 LBS | HEIGHT: 65 IN | RESPIRATION RATE: 18 BRPM | OXYGEN SATURATION: 100 % | DIASTOLIC BLOOD PRESSURE: 55 MMHG

## 2022-09-22 DIAGNOSIS — C54.1 ENDOMETRIAL CARCINOMA: Primary | ICD-10-CM

## 2022-09-22 DIAGNOSIS — Z45.2 FITTING AND ADJUSTMENT OF VASCULAR CATHETER: ICD-10-CM

## 2022-09-22 PROCEDURE — 25010000002 DIPHENHYDRAMINE PER 50 MG: Performed by: INTERNAL MEDICINE

## 2022-09-22 PROCEDURE — 25010000002 PACLITAXEL PROTEIN-BOUND PART PER 1 MG: Performed by: INTERNAL MEDICINE

## 2022-09-22 PROCEDURE — 25010000002 HEPARIN LOCK FLUSH PER 10 UNITS: Performed by: OBSTETRICS & GYNECOLOGY

## 2022-09-22 PROCEDURE — 96375 TX/PRO/DX INJ NEW DRUG ADDON: CPT

## 2022-09-22 PROCEDURE — 25010000002 DEXAMETHASONE SODIUM PHOSPHATE 100 MG/10ML SOLUTION: Performed by: INTERNAL MEDICINE

## 2022-09-22 PROCEDURE — 96365 THER/PROPH/DIAG IV INF INIT: CPT

## 2022-09-22 PROCEDURE — 25010000002 FOSAPREPITANT PER 1 MG: Performed by: INTERNAL MEDICINE

## 2022-09-22 PROCEDURE — 25010000002 PALONOSETRON PER 25 MCG: Performed by: INTERNAL MEDICINE

## 2022-09-22 PROCEDURE — 96413 CHEMO IV INFUSION 1 HR: CPT

## 2022-09-22 PROCEDURE — 96367 TX/PROPH/DG ADDL SEQ IV INF: CPT

## 2022-09-22 RX ORDER — SODIUM CHLORIDE 0.9 % (FLUSH) 0.9 %
10 SYRINGE (ML) INJECTION AS NEEDED
Status: CANCELLED | OUTPATIENT
Start: 2022-09-22

## 2022-09-22 RX ORDER — HEPARIN SODIUM (PORCINE) LOCK FLUSH IV SOLN 100 UNIT/ML 100 UNIT/ML
500 SOLUTION INTRAVENOUS AS NEEDED
Status: CANCELLED | OUTPATIENT
Start: 2022-09-22

## 2022-09-22 RX ORDER — PACLITAXEL 100 MG/20ML
200 INJECTION, POWDER, LYOPHILIZED, FOR SUSPENSION INTRAVENOUS ONCE
Status: COMPLETED | OUTPATIENT
Start: 2022-09-22 | End: 2022-09-22

## 2022-09-22 RX ORDER — DIPHENHYDRAMINE HYDROCHLORIDE 50 MG/ML
50 INJECTION INTRAMUSCULAR; INTRAVENOUS AS NEEDED
Status: DISCONTINUED | OUTPATIENT
Start: 2022-09-22 | End: 2022-09-22 | Stop reason: HOSPADM

## 2022-09-22 RX ORDER — FAMOTIDINE 10 MG/ML
20 INJECTION, SOLUTION INTRAVENOUS AS NEEDED
Status: DISCONTINUED | OUTPATIENT
Start: 2022-09-22 | End: 2022-09-22 | Stop reason: HOSPADM

## 2022-09-22 RX ORDER — HEPARIN SODIUM (PORCINE) LOCK FLUSH IV SOLN 100 UNIT/ML 100 UNIT/ML
500 SOLUTION INTRAVENOUS AS NEEDED
Status: DISCONTINUED | OUTPATIENT
Start: 2022-09-22 | End: 2022-09-22 | Stop reason: HOSPADM

## 2022-09-22 RX ORDER — PALONOSETRON 0.05 MG/ML
0.25 INJECTION, SOLUTION INTRAVENOUS ONCE
Status: COMPLETED | OUTPATIENT
Start: 2022-09-22 | End: 2022-09-22

## 2022-09-22 RX ORDER — SODIUM CHLORIDE 0.9 % (FLUSH) 0.9 %
10 SYRINGE (ML) INJECTION AS NEEDED
Status: DISCONTINUED | OUTPATIENT
Start: 2022-09-22 | End: 2022-09-22 | Stop reason: HOSPADM

## 2022-09-22 RX ORDER — FAMOTIDINE 10 MG/ML
20 INJECTION, SOLUTION INTRAVENOUS ONCE
Status: COMPLETED | OUTPATIENT
Start: 2022-09-22 | End: 2022-09-22

## 2022-09-22 RX ORDER — SODIUM CHLORIDE 9 MG/ML
250 INJECTION, SOLUTION INTRAVENOUS ONCE
Status: COMPLETED | OUTPATIENT
Start: 2022-09-22 | End: 2022-09-22

## 2022-09-22 RX ADMIN — HEPARIN SODIUM (PORCINE) LOCK FLUSH IV SOLN 100 UNIT/ML 500 UNITS: 100 SOLUTION at 14:14

## 2022-09-22 RX ADMIN — FOSAPREPITANT 150 MG: 150 INJECTION, POWDER, LYOPHILIZED, FOR SOLUTION INTRAVENOUS at 12:20

## 2022-09-22 RX ADMIN — SODIUM CHLORIDE 250 ML: 9 INJECTION, SOLUTION INTRAVENOUS at 11:08

## 2022-09-22 RX ADMIN — PACLITAXEL 200 MG: 100 INJECTION, POWDER, LYOPHILIZED, FOR SUSPENSION INTRAVENOUS at 13:04

## 2022-09-22 RX ADMIN — FAMOTIDINE 20 MG: 10 INJECTION INTRAVENOUS at 11:36

## 2022-09-22 RX ADMIN — DEXAMETHASONE SODIUM PHOSPHATE 12 MG: 10 INJECTION, SOLUTION INTRAMUSCULAR; INTRAVENOUS at 11:38

## 2022-09-22 RX ADMIN — PALONOSETRON HYDROCHLORIDE 0.25 MG: 0.25 INJECTION INTRAVENOUS at 11:33

## 2022-09-22 RX ADMIN — Medication 10 ML: at 14:14

## 2022-09-22 RX ADMIN — DIPHENHYDRAMINE HYDROCHLORIDE 50 MG: 50 INJECTION, SOLUTION INTRAMUSCULAR; INTRAVENOUS at 11:59

## 2022-09-27 ENCOUNTER — APPOINTMENT (OUTPATIENT)
Dept: LAB | Facility: HOSPITAL | Age: 62
End: 2022-09-27

## 2022-09-27 ENCOUNTER — APPOINTMENT (OUTPATIENT)
Dept: ONCOLOGY | Facility: HOSPITAL | Age: 62
End: 2022-09-27

## 2022-09-28 ENCOUNTER — APPOINTMENT (OUTPATIENT)
Dept: ONCOLOGY | Facility: HOSPITAL | Age: 62
End: 2022-09-28

## 2022-09-29 ENCOUNTER — LAB (OUTPATIENT)
Dept: LAB | Facility: HOSPITAL | Age: 62
End: 2022-09-29

## 2022-09-29 ENCOUNTER — INFUSION (OUTPATIENT)
Dept: ONCOLOGY | Facility: HOSPITAL | Age: 62
End: 2022-09-29

## 2022-09-29 VITALS
HEART RATE: 55 BPM | HEIGHT: 65 IN | OXYGEN SATURATION: 99 % | BODY MASS INDEX: 35.52 KG/M2 | SYSTOLIC BLOOD PRESSURE: 162 MMHG | WEIGHT: 213.2 LBS | RESPIRATION RATE: 17 BRPM | DIASTOLIC BLOOD PRESSURE: 75 MMHG | TEMPERATURE: 97.8 F

## 2022-09-29 DIAGNOSIS — C54.1 ENDOMETRIAL CARCINOMA: ICD-10-CM

## 2022-09-29 DIAGNOSIS — T45.1X5A CHEMOTHERAPY-INDUCED NEUTROPENIA: ICD-10-CM

## 2022-09-29 DIAGNOSIS — D70.1 CHEMOTHERAPY-INDUCED NEUTROPENIA: ICD-10-CM

## 2022-09-29 LAB
ALBUMIN SERPL-MCNC: 4.2 G/DL (ref 3.5–5.2)
ALBUMIN/GLOB SERPL: 1.9 G/DL
ALP SERPL-CCNC: 66 U/L (ref 39–117)
ALT SERPL W P-5'-P-CCNC: 23 U/L (ref 1–33)
ANION GAP SERPL CALCULATED.3IONS-SCNC: 9 MMOL/L (ref 5–15)
AST SERPL-CCNC: 15 U/L (ref 1–32)
BASOPHILS # BLD AUTO: 0 10*3/MM3 (ref 0–0.2)
BASOPHILS NFR BLD AUTO: 0 % (ref 0–1.5)
BILIRUB SERPL-MCNC: 0.2 MG/DL (ref 0–1.2)
BUN SERPL-MCNC: 16 MG/DL (ref 8–23)
BUN/CREAT SERPL: 26.2 (ref 7–25)
CALCIUM SPEC-SCNC: 9 MG/DL (ref 8.6–10.5)
CHLORIDE SERPL-SCNC: 101 MMOL/L (ref 98–107)
CO2 SERPL-SCNC: 27 MMOL/L (ref 22–29)
CREAT SERPL-MCNC: 0.61 MG/DL (ref 0.57–1)
DEPRECATED RDW RBC AUTO: 42.6 FL (ref 37–54)
EGFRCR SERPLBLD CKD-EPI 2021: 100.6 ML/MIN/1.73
EOSINOPHIL # BLD AUTO: 0 10*3/MM3 (ref 0–0.4)
EOSINOPHIL NFR BLD AUTO: 0 % (ref 0.3–6.2)
ERYTHROCYTE [DISTWIDTH] IN BLOOD BY AUTOMATED COUNT: 12.7 % (ref 12.3–15.4)
GLOBULIN UR ELPH-MCNC: 2.2 GM/DL
GLUCOSE SERPL-MCNC: 91 MG/DL (ref 65–99)
HCT VFR BLD AUTO: 38.1 % (ref 34–46.6)
HGB BLD-MCNC: 12.5 G/DL (ref 12–15.9)
IMM GRANULOCYTES # BLD AUTO: 0.04 10*3/MM3 (ref 0–0.05)
IMM GRANULOCYTES NFR BLD AUTO: 2.4 % (ref 0–0.5)
LYMPHOCYTES # BLD AUTO: 0.31 10*3/MM3 (ref 0.7–3.1)
LYMPHOCYTES NFR BLD AUTO: 18.9 % (ref 19.6–45.3)
MAGNESIUM SERPL-MCNC: 2.2 MG/DL (ref 1.6–2.4)
MCH RBC QN AUTO: 30.3 PG (ref 26.6–33)
MCHC RBC AUTO-ENTMCNC: 32.8 G/DL (ref 31.5–35.7)
MCV RBC AUTO: 92.3 FL (ref 79–97)
MONOCYTES # BLD AUTO: 0.25 10*3/MM3 (ref 0.1–0.9)
MONOCYTES NFR BLD AUTO: 15.2 % (ref 5–12)
NEUTROPHILS NFR BLD AUTO: 1.04 10*3/MM3 (ref 1.7–7)
NEUTROPHILS NFR BLD AUTO: 63.5 % (ref 42.7–76)
NRBC BLD AUTO-RTO: 0 /100 WBC (ref 0–0.2)
PLATELET # BLD AUTO: 153 10*3/MM3 (ref 140–450)
PMV BLD AUTO: 9.6 FL (ref 6–12)
POTASSIUM SERPL-SCNC: 4.7 MMOL/L (ref 3.5–5.2)
PROT SERPL-MCNC: 6.4 G/DL (ref 6–8.5)
RBC # BLD AUTO: 4.13 10*6/MM3 (ref 3.77–5.28)
SODIUM SERPL-SCNC: 137 MMOL/L (ref 136–145)
WBC NRBC COR # BLD: 1.64 10*3/MM3 (ref 3.4–10.8)

## 2022-09-29 PROCEDURE — 83735 ASSAY OF MAGNESIUM: CPT

## 2022-09-29 PROCEDURE — 85025 COMPLETE CBC W/AUTO DIFF WBC: CPT

## 2022-09-29 PROCEDURE — 80053 COMPREHEN METABOLIC PANEL: CPT

## 2022-09-29 PROCEDURE — 36415 COLL VENOUS BLD VENIPUNCTURE: CPT

## 2022-09-29 PROCEDURE — G0463 HOSPITAL OUTPT CLINIC VISIT: HCPCS

## 2022-09-29 RX ORDER — PALONOSETRON 0.05 MG/ML
0.25 INJECTION, SOLUTION INTRAVENOUS ONCE
Status: CANCELLED | OUTPATIENT
Start: 2022-10-06

## 2022-09-29 NOTE — PROGRESS NOTES
1048 Dr. Lima wants to hold treatment this week due to low WBC/ANC and recheck next week with possible treatment. Patient aware, verbalized understanding.

## 2022-10-06 ENCOUNTER — LAB (OUTPATIENT)
Dept: LAB | Facility: HOSPITAL | Age: 62
End: 2022-10-06

## 2022-10-06 ENCOUNTER — INFUSION (OUTPATIENT)
Dept: ONCOLOGY | Facility: HOSPITAL | Age: 62
End: 2022-10-06

## 2022-10-06 VITALS
TEMPERATURE: 97.1 F | RESPIRATION RATE: 16 BRPM | HEIGHT: 65 IN | DIASTOLIC BLOOD PRESSURE: 71 MMHG | SYSTOLIC BLOOD PRESSURE: 136 MMHG | OXYGEN SATURATION: 99 % | HEART RATE: 49 BPM | BODY MASS INDEX: 35.32 KG/M2 | WEIGHT: 212 LBS

## 2022-10-06 DIAGNOSIS — C54.1 ENDOMETRIAL CARCINOMA: ICD-10-CM

## 2022-10-06 DIAGNOSIS — Z45.2 FITTING AND ADJUSTMENT OF VASCULAR CATHETER: Primary | ICD-10-CM

## 2022-10-06 LAB
ALBUMIN SERPL-MCNC: 4.1 G/DL (ref 3.5–5.2)
ALBUMIN/GLOB SERPL: 1.5 G/DL
ALP SERPL-CCNC: 73 U/L (ref 39–117)
ALT SERPL W P-5'-P-CCNC: 21 U/L (ref 1–33)
ANION GAP SERPL CALCULATED.3IONS-SCNC: 11 MMOL/L (ref 5–15)
AST SERPL-CCNC: 16 U/L (ref 1–32)
BASOPHILS # BLD AUTO: 0.01 10*3/MM3 (ref 0–0.2)
BASOPHILS NFR BLD AUTO: 0.3 % (ref 0–1.5)
BILIRUB SERPL-MCNC: 0.2 MG/DL (ref 0–1.2)
BUN SERPL-MCNC: 18 MG/DL (ref 8–23)
BUN/CREAT SERPL: 24.7 (ref 7–25)
CALCIUM SPEC-SCNC: 9.3 MG/DL (ref 8.6–10.5)
CHLORIDE SERPL-SCNC: 102 MMOL/L (ref 98–107)
CO2 SERPL-SCNC: 26 MMOL/L (ref 22–29)
CREAT SERPL-MCNC: 0.73 MG/DL (ref 0.57–1)
DEPRECATED RDW RBC AUTO: 42.9 FL (ref 37–54)
EGFRCR SERPLBLD CKD-EPI 2021: 92.5 ML/MIN/1.73
EOSINOPHIL # BLD AUTO: 0 10*3/MM3 (ref 0–0.4)
EOSINOPHIL NFR BLD AUTO: 0 % (ref 0.3–6.2)
ERYTHROCYTE [DISTWIDTH] IN BLOOD BY AUTOMATED COUNT: 13.1 % (ref 12.3–15.4)
GLOBULIN UR ELPH-MCNC: 2.7 GM/DL
GLUCOSE SERPL-MCNC: 91 MG/DL (ref 65–99)
HCT VFR BLD AUTO: 39.3 % (ref 34–46.6)
HGB BLD-MCNC: 13.1 G/DL (ref 12–15.9)
IMM GRANULOCYTES # BLD AUTO: 0.1 10*3/MM3 (ref 0–0.05)
IMM GRANULOCYTES NFR BLD AUTO: 2.6 % (ref 0–0.5)
LYMPHOCYTES # BLD AUTO: 0.45 10*3/MM3 (ref 0.7–3.1)
LYMPHOCYTES NFR BLD AUTO: 11.6 % (ref 19.6–45.3)
MAGNESIUM SERPL-MCNC: 2.4 MG/DL (ref 1.6–2.4)
MCH RBC QN AUTO: 30 PG (ref 26.6–33)
MCHC RBC AUTO-ENTMCNC: 33.3 G/DL (ref 31.5–35.7)
MCV RBC AUTO: 89.9 FL (ref 79–97)
MONOCYTES # BLD AUTO: 0.52 10*3/MM3 (ref 0.1–0.9)
MONOCYTES NFR BLD AUTO: 13.4 % (ref 5–12)
NEUTROPHILS NFR BLD AUTO: 2.79 10*3/MM3 (ref 1.7–7)
NEUTROPHILS NFR BLD AUTO: 72.1 % (ref 42.7–76)
NRBC BLD AUTO-RTO: 0 /100 WBC (ref 0–0.2)
PLATELET # BLD AUTO: 189 10*3/MM3 (ref 140–450)
PMV BLD AUTO: 9.3 FL (ref 6–12)
POTASSIUM SERPL-SCNC: 4.1 MMOL/L (ref 3.5–5.2)
PROT SERPL-MCNC: 6.8 G/DL (ref 6–8.5)
RBC # BLD AUTO: 4.37 10*6/MM3 (ref 3.77–5.28)
SODIUM SERPL-SCNC: 139 MMOL/L (ref 136–145)
WBC NRBC COR # BLD: 3.87 10*3/MM3 (ref 3.4–10.8)

## 2022-10-06 PROCEDURE — 83735 ASSAY OF MAGNESIUM: CPT

## 2022-10-06 PROCEDURE — 25010000002 PALONOSETRON PER 25 MCG: Performed by: INTERNAL MEDICINE

## 2022-10-06 PROCEDURE — 96375 TX/PRO/DX INJ NEW DRUG ADDON: CPT

## 2022-10-06 PROCEDURE — 96367 TX/PROPH/DG ADDL SEQ IV INF: CPT

## 2022-10-06 PROCEDURE — 85025 COMPLETE CBC W/AUTO DIFF WBC: CPT

## 2022-10-06 PROCEDURE — 80053 COMPREHEN METABOLIC PANEL: CPT

## 2022-10-06 PROCEDURE — 25010000002 FOSAPREPITANT PER 1 MG: Performed by: INTERNAL MEDICINE

## 2022-10-06 PROCEDURE — 96413 CHEMO IV INFUSION 1 HR: CPT

## 2022-10-06 PROCEDURE — 25010000002 DIPHENHYDRAMINE PER 50 MG: Performed by: INTERNAL MEDICINE

## 2022-10-06 PROCEDURE — 25010000002 PACLITAXEL PROTEIN-BOUND PART PER 1 MG: Performed by: INTERNAL MEDICINE

## 2022-10-06 PROCEDURE — 36415 COLL VENOUS BLD VENIPUNCTURE: CPT

## 2022-10-06 PROCEDURE — 25010000002 DEXAMETHASONE SODIUM PHOSPHATE 100 MG/10ML SOLUTION: Performed by: INTERNAL MEDICINE

## 2022-10-06 PROCEDURE — 25010000002 HEPARIN LOCK FLUSH PER 10 UNITS: Performed by: OBSTETRICS & GYNECOLOGY

## 2022-10-06 RX ORDER — SODIUM CHLORIDE 9 MG/ML
250 INJECTION, SOLUTION INTRAVENOUS ONCE
Status: COMPLETED | OUTPATIENT
Start: 2022-10-06 | End: 2022-10-06

## 2022-10-06 RX ORDER — PACLITAXEL 100 MG/20ML
100 INJECTION, POWDER, LYOPHILIZED, FOR SUSPENSION INTRAVENOUS ONCE
Status: CANCELLED
Start: 2022-10-13

## 2022-10-06 RX ORDER — PACLITAXEL 100 MG/20ML
100 INJECTION, POWDER, LYOPHILIZED, FOR SUSPENSION INTRAVENOUS ONCE
Status: CANCELLED
Start: 2022-11-03

## 2022-10-06 RX ORDER — SODIUM CHLORIDE 0.9 % (FLUSH) 0.9 %
10 SYRINGE (ML) INJECTION AS NEEDED
Status: DISCONTINUED | OUTPATIENT
Start: 2022-10-06 | End: 2022-10-06 | Stop reason: HOSPADM

## 2022-10-06 RX ORDER — FAMOTIDINE 10 MG/ML
20 INJECTION, SOLUTION INTRAVENOUS ONCE
Status: CANCELLED | OUTPATIENT
Start: 2022-10-20 | End: 2022-10-20

## 2022-10-06 RX ORDER — DIPHENHYDRAMINE HYDROCHLORIDE 50 MG/ML
50 INJECTION INTRAMUSCULAR; INTRAVENOUS AS NEEDED
Status: DISCONTINUED | OUTPATIENT
Start: 2022-10-06 | End: 2022-10-06 | Stop reason: HOSPADM

## 2022-10-06 RX ORDER — PACLITAXEL 100 MG/20ML
200 INJECTION, POWDER, LYOPHILIZED, FOR SUSPENSION INTRAVENOUS ONCE
Status: COMPLETED | OUTPATIENT
Start: 2022-10-06 | End: 2022-10-06

## 2022-10-06 RX ORDER — FAMOTIDINE 10 MG/ML
20 INJECTION, SOLUTION INTRAVENOUS AS NEEDED
Status: CANCELLED | OUTPATIENT
Start: 2022-10-20

## 2022-10-06 RX ORDER — SODIUM CHLORIDE 0.9 % (FLUSH) 0.9 %
10 SYRINGE (ML) INJECTION AS NEEDED
Status: CANCELLED | OUTPATIENT
Start: 2022-10-06

## 2022-10-06 RX ORDER — PACLITAXEL 100 MG/20ML
100 INJECTION, POWDER, LYOPHILIZED, FOR SUSPENSION INTRAVENOUS ONCE
Status: CANCELLED
Start: 2022-10-20

## 2022-10-06 RX ORDER — FAMOTIDINE 10 MG/ML
20 INJECTION, SOLUTION INTRAVENOUS AS NEEDED
Status: DISCONTINUED | OUTPATIENT
Start: 2022-10-06 | End: 2022-10-06 | Stop reason: HOSPADM

## 2022-10-06 RX ORDER — PALONOSETRON 0.05 MG/ML
0.25 INJECTION, SOLUTION INTRAVENOUS ONCE
Status: CANCELLED | OUTPATIENT
Start: 2022-10-13

## 2022-10-06 RX ORDER — PALONOSETRON 0.05 MG/ML
0.25 INJECTION, SOLUTION INTRAVENOUS ONCE
Status: CANCELLED | OUTPATIENT
Start: 2022-11-03

## 2022-10-06 RX ORDER — DIPHENHYDRAMINE HYDROCHLORIDE 50 MG/ML
50 INJECTION INTRAMUSCULAR; INTRAVENOUS AS NEEDED
Status: CANCELLED | OUTPATIENT
Start: 2022-11-03

## 2022-10-06 RX ORDER — FAMOTIDINE 10 MG/ML
20 INJECTION, SOLUTION INTRAVENOUS ONCE
Status: CANCELLED
Start: 2022-10-13 | End: 2022-10-13

## 2022-10-06 RX ORDER — FAMOTIDINE 10 MG/ML
20 INJECTION, SOLUTION INTRAVENOUS ONCE
Status: CANCELLED | OUTPATIENT
Start: 2022-11-03 | End: 2022-10-27

## 2022-10-06 RX ORDER — DEXAMETHASONE 4 MG/1
TABLET ORAL
Qty: 40 TABLET | Refills: 0 | Status: SHIPPED | OUTPATIENT
Start: 2022-10-06 | End: 2023-02-07

## 2022-10-06 RX ORDER — FAMOTIDINE 10 MG/ML
20 INJECTION, SOLUTION INTRAVENOUS AS NEEDED
Status: CANCELLED | OUTPATIENT
Start: 2022-10-13

## 2022-10-06 RX ORDER — SODIUM CHLORIDE 9 MG/ML
250 INJECTION, SOLUTION INTRAVENOUS ONCE
Status: CANCELLED | OUTPATIENT
Start: 2022-10-20

## 2022-10-06 RX ORDER — FAMOTIDINE 10 MG/ML
20 INJECTION, SOLUTION INTRAVENOUS AS NEEDED
Status: CANCELLED | OUTPATIENT
Start: 2022-11-03

## 2022-10-06 RX ORDER — PALONOSETRON 0.05 MG/ML
0.25 INJECTION, SOLUTION INTRAVENOUS ONCE
Status: CANCELLED | OUTPATIENT
Start: 2022-10-20

## 2022-10-06 RX ORDER — PALONOSETRON 0.05 MG/ML
0.25 INJECTION, SOLUTION INTRAVENOUS ONCE
Status: COMPLETED | OUTPATIENT
Start: 2022-10-06 | End: 2022-10-06

## 2022-10-06 RX ORDER — SODIUM CHLORIDE 9 MG/ML
250 INJECTION, SOLUTION INTRAVENOUS ONCE
Status: CANCELLED | OUTPATIENT
Start: 2022-10-13

## 2022-10-06 RX ORDER — FAMOTIDINE 10 MG/ML
20 INJECTION, SOLUTION INTRAVENOUS ONCE
Status: COMPLETED | OUTPATIENT
Start: 2022-10-06 | End: 2022-10-06

## 2022-10-06 RX ORDER — HEPARIN SODIUM (PORCINE) LOCK FLUSH IV SOLN 100 UNIT/ML 100 UNIT/ML
500 SOLUTION INTRAVENOUS AS NEEDED
Status: CANCELLED | OUTPATIENT
Start: 2022-10-06

## 2022-10-06 RX ORDER — DIPHENHYDRAMINE HYDROCHLORIDE 50 MG/ML
50 INJECTION INTRAMUSCULAR; INTRAVENOUS AS NEEDED
Status: CANCELLED | OUTPATIENT
Start: 2022-10-13

## 2022-10-06 RX ORDER — DIPHENHYDRAMINE HYDROCHLORIDE 50 MG/ML
50 INJECTION INTRAMUSCULAR; INTRAVENOUS AS NEEDED
Status: CANCELLED | OUTPATIENT
Start: 2022-10-20

## 2022-10-06 RX ORDER — HEPARIN SODIUM (PORCINE) LOCK FLUSH IV SOLN 100 UNIT/ML 100 UNIT/ML
500 SOLUTION INTRAVENOUS AS NEEDED
Status: DISCONTINUED | OUTPATIENT
Start: 2022-10-06 | End: 2022-10-06 | Stop reason: HOSPADM

## 2022-10-06 RX ORDER — SODIUM CHLORIDE 9 MG/ML
250 INJECTION, SOLUTION INTRAVENOUS ONCE
Status: CANCELLED | OUTPATIENT
Start: 2022-11-03

## 2022-10-06 RX ADMIN — SODIUM CHLORIDE 250 ML: 9 INJECTION, SOLUTION INTRAVENOUS at 11:33

## 2022-10-06 RX ADMIN — PACLITAXEL 200 MG: 100 INJECTION, POWDER, LYOPHILIZED, FOR SUSPENSION INTRAVENOUS at 12:55

## 2022-10-06 RX ADMIN — Medication 500 UNITS: at 13:34

## 2022-10-06 RX ADMIN — FOSAPREPITANT 150 MG: 150 INJECTION, POWDER, LYOPHILIZED, FOR SOLUTION INTRAVENOUS at 12:20

## 2022-10-06 RX ADMIN — DEXAMETHASONE SODIUM PHOSPHATE 12 MG: 10 INJECTION, SOLUTION INTRAMUSCULAR; INTRAVENOUS at 11:41

## 2022-10-06 RX ADMIN — PALONOSETRON HYDROCHLORIDE 0.25 MG: 0.25 INJECTION, SOLUTION INTRAVENOUS at 11:40

## 2022-10-06 RX ADMIN — FAMOTIDINE 20 MG: 10 INJECTION INTRAVENOUS at 11:39

## 2022-10-06 RX ADMIN — Medication 10 ML: at 13:34

## 2022-10-06 RX ADMIN — DIPHENHYDRAMINE HYDROCHLORIDE 50 MG: 50 INJECTION, SOLUTION INTRAMUSCULAR; INTRAVENOUS at 12:00

## 2022-10-12 DIAGNOSIS — C54.1 ENDOMETRIAL CARCINOMA: Primary | ICD-10-CM

## 2022-10-13 ENCOUNTER — LAB (OUTPATIENT)
Dept: LAB | Facility: HOSPITAL | Age: 62
End: 2022-10-13

## 2022-10-13 ENCOUNTER — INFUSION (OUTPATIENT)
Dept: ONCOLOGY | Facility: HOSPITAL | Age: 62
End: 2022-10-13

## 2022-10-13 VITALS
TEMPERATURE: 95.2 F | OXYGEN SATURATION: 96 % | RESPIRATION RATE: 16 BRPM | HEART RATE: 53 BPM | HEIGHT: 65 IN | WEIGHT: 213 LBS | BODY MASS INDEX: 35.49 KG/M2 | DIASTOLIC BLOOD PRESSURE: 71 MMHG | SYSTOLIC BLOOD PRESSURE: 145 MMHG

## 2022-10-13 DIAGNOSIS — C54.1 ENDOMETRIAL CARCINOMA: Primary | ICD-10-CM

## 2022-10-13 DIAGNOSIS — Z45.2 FITTING AND ADJUSTMENT OF VASCULAR CATHETER: ICD-10-CM

## 2022-10-13 DIAGNOSIS — C54.1 ENDOMETRIAL CARCINOMA: ICD-10-CM

## 2022-10-13 LAB
ALBUMIN SERPL-MCNC: 4.2 G/DL (ref 3.5–5.2)
ALBUMIN/GLOB SERPL: 1.8 G/DL
ALP SERPL-CCNC: 71 U/L (ref 39–117)
ALT SERPL W P-5'-P-CCNC: 19 U/L (ref 1–33)
ANION GAP SERPL CALCULATED.3IONS-SCNC: 8 MMOL/L (ref 5–15)
ANISOCYTOSIS BLD QL: ABNORMAL
AST SERPL-CCNC: 16 U/L (ref 1–32)
BILIRUB SERPL-MCNC: 0.2 MG/DL (ref 0–1.2)
BUN SERPL-MCNC: 21 MG/DL (ref 8–23)
BUN/CREAT SERPL: 35.6 (ref 7–25)
CALCIUM SPEC-SCNC: 9.5 MG/DL (ref 8.6–10.5)
CHLORIDE SERPL-SCNC: 102 MMOL/L (ref 98–107)
CO2 SERPL-SCNC: 30 MMOL/L (ref 22–29)
CREAT SERPL-MCNC: 0.59 MG/DL (ref 0.57–1)
DEPRECATED RDW RBC AUTO: 42.6 FL (ref 37–54)
EGFRCR SERPLBLD CKD-EPI 2021: 101.4 ML/MIN/1.73
ERYTHROCYTE [DISTWIDTH] IN BLOOD BY AUTOMATED COUNT: 13.2 % (ref 12.3–15.4)
GLOBULIN UR ELPH-MCNC: 2.3 GM/DL
GLUCOSE SERPL-MCNC: 86 MG/DL (ref 65–99)
HCT VFR BLD AUTO: 38 % (ref 34–46.6)
HGB BLD-MCNC: 12.6 G/DL (ref 12–15.9)
LYMPHOCYTES # BLD MANUAL: 0.54 10*3/MM3 (ref 0.7–3.1)
LYMPHOCYTES NFR BLD MANUAL: 5 % (ref 5–12)
MAGNESIUM SERPL-MCNC: 2.2 MG/DL (ref 1.6–2.4)
MCH RBC QN AUTO: 29.8 PG (ref 26.6–33)
MCHC RBC AUTO-ENTMCNC: 33.2 G/DL (ref 31.5–35.7)
MCV RBC AUTO: 89.8 FL (ref 79–97)
MONOCYTES # BLD: 0.34 10*3/MM3 (ref 0.1–0.9)
NEUTROPHILS # BLD AUTO: 5.86 10*3/MM3 (ref 1.7–7)
NEUTROPHILS NFR BLD MANUAL: 84 % (ref 42.7–76)
NEUTS BAND NFR BLD MANUAL: 3 % (ref 0–5)
NRBC SPEC MANUAL: 1 /100 WBC (ref 0–0.2)
OVALOCYTES BLD QL SMEAR: ABNORMAL
PLAT MORPH BLD: NORMAL
PLATELET # BLD AUTO: 234 10*3/MM3 (ref 140–450)
PMV BLD AUTO: 9.5 FL (ref 6–12)
POIKILOCYTOSIS BLD QL SMEAR: ABNORMAL
POLYCHROMASIA BLD QL SMEAR: ABNORMAL
POTASSIUM SERPL-SCNC: 4.4 MMOL/L (ref 3.5–5.2)
PROT SERPL-MCNC: 6.5 G/DL (ref 6–8.5)
RBC # BLD AUTO: 4.23 10*6/MM3 (ref 3.77–5.28)
SODIUM SERPL-SCNC: 140 MMOL/L (ref 136–145)
STOMATOCYTES BLD QL SMEAR: ABNORMAL
TOXIC GRANULATION: ABNORMAL
VARIANT LYMPHS NFR BLD MANUAL: 8 % (ref 19.6–45.3)
WBC NRBC COR # BLD: 6.73 10*3/MM3 (ref 3.4–10.8)

## 2022-10-13 PROCEDURE — 36415 COLL VENOUS BLD VENIPUNCTURE: CPT

## 2022-10-13 PROCEDURE — 96413 CHEMO IV INFUSION 1 HR: CPT

## 2022-10-13 PROCEDURE — 25010000002 DIPHENHYDRAMINE PER 50 MG: Performed by: INTERNAL MEDICINE

## 2022-10-13 PROCEDURE — 25010000002 PACLITAXEL PROTEIN-BOUND PART PER 1 MG: Performed by: INTERNAL MEDICINE

## 2022-10-13 PROCEDURE — 80053 COMPREHEN METABOLIC PANEL: CPT

## 2022-10-13 PROCEDURE — 85007 BL SMEAR W/DIFF WBC COUNT: CPT

## 2022-10-13 PROCEDURE — 25010000002 FOSAPREPITANT PER 1 MG: Performed by: INTERNAL MEDICINE

## 2022-10-13 PROCEDURE — 25010000002 HEPARIN LOCK FLUSH PER 10 UNITS: Performed by: OBSTETRICS & GYNECOLOGY

## 2022-10-13 PROCEDURE — 96375 TX/PRO/DX INJ NEW DRUG ADDON: CPT

## 2022-10-13 PROCEDURE — 85025 COMPLETE CBC W/AUTO DIFF WBC: CPT

## 2022-10-13 PROCEDURE — 83735 ASSAY OF MAGNESIUM: CPT

## 2022-10-13 PROCEDURE — 96367 TX/PROPH/DG ADDL SEQ IV INF: CPT

## 2022-10-13 PROCEDURE — 25010000002 DEXAMETHASONE SODIUM PHOSPHATE 100 MG/10ML SOLUTION: Performed by: INTERNAL MEDICINE

## 2022-10-13 PROCEDURE — 96417 CHEMO IV INFUS EACH ADDL SEQ: CPT

## 2022-10-13 PROCEDURE — 25010000002 CARBOPLATIN PER 50 MG: Performed by: INTERNAL MEDICINE

## 2022-10-13 PROCEDURE — 25010000002 PALONOSETRON PER 25 MCG: Performed by: INTERNAL MEDICINE

## 2022-10-13 RX ORDER — SODIUM CHLORIDE 0.9 % (FLUSH) 0.9 %
10 SYRINGE (ML) INJECTION AS NEEDED
Status: CANCELLED | OUTPATIENT
Start: 2022-10-13

## 2022-10-13 RX ORDER — FAMOTIDINE 10 MG/ML
20 INJECTION, SOLUTION INTRAVENOUS ONCE
Status: COMPLETED | OUTPATIENT
Start: 2022-10-13 | End: 2022-10-13

## 2022-10-13 RX ORDER — DIPHENHYDRAMINE HYDROCHLORIDE 50 MG/ML
50 INJECTION INTRAMUSCULAR; INTRAVENOUS AS NEEDED
Status: DISCONTINUED | OUTPATIENT
Start: 2022-10-13 | End: 2022-10-13 | Stop reason: HOSPADM

## 2022-10-13 RX ORDER — PALONOSETRON 0.05 MG/ML
0.25 INJECTION, SOLUTION INTRAVENOUS ONCE
Status: COMPLETED | OUTPATIENT
Start: 2022-10-13 | End: 2022-10-13

## 2022-10-13 RX ORDER — SODIUM CHLORIDE 0.9 % (FLUSH) 0.9 %
10 SYRINGE (ML) INJECTION AS NEEDED
Status: DISCONTINUED | OUTPATIENT
Start: 2022-10-13 | End: 2022-10-13 | Stop reason: HOSPADM

## 2022-10-13 RX ORDER — HEPARIN SODIUM (PORCINE) LOCK FLUSH IV SOLN 100 UNIT/ML 100 UNIT/ML
500 SOLUTION INTRAVENOUS AS NEEDED
Status: CANCELLED | OUTPATIENT
Start: 2022-10-13

## 2022-10-13 RX ORDER — PACLITAXEL 100 MG/20ML
200 INJECTION, POWDER, LYOPHILIZED, FOR SUSPENSION INTRAVENOUS ONCE
Status: COMPLETED | OUTPATIENT
Start: 2022-10-13 | End: 2022-10-13

## 2022-10-13 RX ORDER — HEPARIN SODIUM (PORCINE) LOCK FLUSH IV SOLN 100 UNIT/ML 100 UNIT/ML
500 SOLUTION INTRAVENOUS AS NEEDED
Status: DISCONTINUED | OUTPATIENT
Start: 2022-10-13 | End: 2022-10-13 | Stop reason: HOSPADM

## 2022-10-13 RX ORDER — FAMOTIDINE 10 MG/ML
20 INJECTION, SOLUTION INTRAVENOUS AS NEEDED
Status: DISCONTINUED | OUTPATIENT
Start: 2022-10-13 | End: 2022-10-13 | Stop reason: HOSPADM

## 2022-10-13 RX ORDER — SODIUM CHLORIDE 9 MG/ML
250 INJECTION, SOLUTION INTRAVENOUS ONCE
Status: COMPLETED | OUTPATIENT
Start: 2022-10-13 | End: 2022-10-13

## 2022-10-13 RX ADMIN — SODIUM CHLORIDE 250 ML: 9 INJECTION, SOLUTION INTRAVENOUS at 11:44

## 2022-10-13 RX ADMIN — DEXAMETHASONE SODIUM PHOSPHATE 20 MG: 10 INJECTION, SOLUTION INTRAMUSCULAR; INTRAVENOUS at 12:13

## 2022-10-13 RX ADMIN — FAMOTIDINE 20 MG: 10 INJECTION INTRAVENOUS at 11:44

## 2022-10-13 RX ADMIN — PALONOSETRON HYDROCHLORIDE 0.25 MG: 0.25 INJECTION, SOLUTION INTRAVENOUS at 11:44

## 2022-10-13 RX ADMIN — Medication 10 ML: at 14:57

## 2022-10-13 RX ADMIN — HEPARIN SODIUM (PORCINE) LOCK FLUSH IV SOLN 100 UNIT/ML 500 UNITS: 100 SOLUTION at 14:57

## 2022-10-13 RX ADMIN — PACLITAXEL 200 MG: 100 INJECTION, POWDER, LYOPHILIZED, FOR SUSPENSION INTRAVENOUS at 13:12

## 2022-10-13 RX ADMIN — FOSAPREPITANT 150 MG: 150 INJECTION, POWDER, LYOPHILIZED, FOR SOLUTION INTRAVENOUS at 12:39

## 2022-10-13 RX ADMIN — DIPHENHYDRAMINE HYDROCHLORIDE 50 MG: 50 INJECTION, SOLUTION INTRAMUSCULAR; INTRAVENOUS at 11:44

## 2022-10-13 RX ADMIN — CARBOPLATIN 600 MG: 10 INJECTION, SOLUTION INTRAVENOUS at 13:51

## 2022-10-20 ENCOUNTER — LAB (OUTPATIENT)
Dept: LAB | Facility: HOSPITAL | Age: 62
End: 2022-10-20

## 2022-10-20 ENCOUNTER — INFUSION (OUTPATIENT)
Dept: ONCOLOGY | Facility: HOSPITAL | Age: 62
End: 2022-10-20

## 2022-10-20 VITALS
DIASTOLIC BLOOD PRESSURE: 66 MMHG | OXYGEN SATURATION: 100 % | SYSTOLIC BLOOD PRESSURE: 125 MMHG | WEIGHT: 212 LBS | RESPIRATION RATE: 18 BRPM | HEIGHT: 65 IN | TEMPERATURE: 97 F | HEART RATE: 52 BPM | BODY MASS INDEX: 35.32 KG/M2

## 2022-10-20 DIAGNOSIS — C54.1 ENDOMETRIAL CARCINOMA: ICD-10-CM

## 2022-10-20 DIAGNOSIS — Z45.2 FITTING AND ADJUSTMENT OF VASCULAR CATHETER: ICD-10-CM

## 2022-10-20 DIAGNOSIS — C54.1 ENDOMETRIAL CARCINOMA: Primary | ICD-10-CM

## 2022-10-20 LAB
ALBUMIN SERPL-MCNC: 4.2 G/DL (ref 3.5–5.2)
ALBUMIN/GLOB SERPL: 1.8 G/DL
ALP SERPL-CCNC: 65 U/L (ref 39–117)
ALT SERPL W P-5'-P-CCNC: 21 U/L (ref 1–33)
ANION GAP SERPL CALCULATED.3IONS-SCNC: 9 MMOL/L (ref 5–15)
AST SERPL-CCNC: 18 U/L (ref 1–32)
BASOPHILS # BLD AUTO: 0.01 10*3/MM3 (ref 0–0.2)
BASOPHILS NFR BLD AUTO: 0.4 % (ref 0–1.5)
BILIRUB SERPL-MCNC: 0.4 MG/DL (ref 0–1.2)
BUN SERPL-MCNC: 22 MG/DL (ref 8–23)
BUN/CREAT SERPL: 35.5 (ref 7–25)
CALCIUM SPEC-SCNC: 9.4 MG/DL (ref 8.6–10.5)
CHLORIDE SERPL-SCNC: 100 MMOL/L (ref 98–107)
CO2 SERPL-SCNC: 28 MMOL/L (ref 22–29)
CREAT SERPL-MCNC: 0.62 MG/DL (ref 0.57–1)
DEPRECATED RDW RBC AUTO: 43.5 FL (ref 37–54)
EGFRCR SERPLBLD CKD-EPI 2021: 100.2 ML/MIN/1.73
EOSINOPHIL # BLD AUTO: 0 10*3/MM3 (ref 0–0.4)
EOSINOPHIL NFR BLD AUTO: 0 % (ref 0.3–6.2)
ERYTHROCYTE [DISTWIDTH] IN BLOOD BY AUTOMATED COUNT: 13.4 % (ref 12.3–15.4)
GLOBULIN UR ELPH-MCNC: 2.4 GM/DL
GLUCOSE SERPL-MCNC: 88 MG/DL (ref 65–99)
HCT VFR BLD AUTO: 35.7 % (ref 34–46.6)
HGB BLD-MCNC: 12.2 G/DL (ref 12–15.9)
IMM GRANULOCYTES # BLD AUTO: 0.03 10*3/MM3 (ref 0–0.05)
IMM GRANULOCYTES NFR BLD AUTO: 1.1 % (ref 0–0.5)
LYMPHOCYTES # BLD AUTO: 0.36 10*3/MM3 (ref 0.7–3.1)
LYMPHOCYTES NFR BLD AUTO: 13.1 % (ref 19.6–45.3)
MCH RBC QN AUTO: 30.7 PG (ref 26.6–33)
MCHC RBC AUTO-ENTMCNC: 34.2 G/DL (ref 31.5–35.7)
MCV RBC AUTO: 89.7 FL (ref 79–97)
MONOCYTES # BLD AUTO: 0.22 10*3/MM3 (ref 0.1–0.9)
MONOCYTES NFR BLD AUTO: 8 % (ref 5–12)
NEUTROPHILS NFR BLD AUTO: 2.13 10*3/MM3 (ref 1.7–7)
NEUTROPHILS NFR BLD AUTO: 77.4 % (ref 42.7–76)
NRBC BLD AUTO-RTO: 0 /100 WBC (ref 0–0.2)
PLATELET # BLD AUTO: 182 10*3/MM3 (ref 140–450)
PMV BLD AUTO: 9.5 FL (ref 6–12)
POTASSIUM SERPL-SCNC: 3.9 MMOL/L (ref 3.5–5.2)
PROT SERPL-MCNC: 6.6 G/DL (ref 6–8.5)
RBC # BLD AUTO: 3.98 10*6/MM3 (ref 3.77–5.28)
SODIUM SERPL-SCNC: 137 MMOL/L (ref 136–145)
WBC NRBC COR # BLD: 2.75 10*3/MM3 (ref 3.4–10.8)

## 2022-10-20 PROCEDURE — 25010000002 DIPHENHYDRAMINE PER 50 MG: Performed by: INTERNAL MEDICINE

## 2022-10-20 PROCEDURE — 96375 TX/PRO/DX INJ NEW DRUG ADDON: CPT

## 2022-10-20 PROCEDURE — 25010000002 HEPARIN LOCK FLUSH PER 10 UNITS: Performed by: OBSTETRICS & GYNECOLOGY

## 2022-10-20 PROCEDURE — 96367 TX/PROPH/DG ADDL SEQ IV INF: CPT

## 2022-10-20 PROCEDURE — 25010000002 PALONOSETRON PER 25 MCG: Performed by: INTERNAL MEDICINE

## 2022-10-20 PROCEDURE — 25010000002 FOSAPREPITANT PER 1 MG: Performed by: INTERNAL MEDICINE

## 2022-10-20 PROCEDURE — 96365 THER/PROPH/DIAG IV INF INIT: CPT

## 2022-10-20 PROCEDURE — 80053 COMPREHEN METABOLIC PANEL: CPT

## 2022-10-20 PROCEDURE — 25010000002 DEXAMETHASONE SODIUM PHOSPHATE 100 MG/10ML SOLUTION: Performed by: INTERNAL MEDICINE

## 2022-10-20 PROCEDURE — 25010000002 PACLITAXEL PROTEIN-BOUND PART PER 1 MG: Performed by: INTERNAL MEDICINE

## 2022-10-20 PROCEDURE — 96413 CHEMO IV INFUSION 1 HR: CPT

## 2022-10-20 PROCEDURE — 36415 COLL VENOUS BLD VENIPUNCTURE: CPT

## 2022-10-20 PROCEDURE — 85025 COMPLETE CBC W/AUTO DIFF WBC: CPT

## 2022-10-20 RX ORDER — SODIUM CHLORIDE 0.9 % (FLUSH) 0.9 %
10 SYRINGE (ML) INJECTION AS NEEDED
Status: CANCELLED | OUTPATIENT
Start: 2022-10-20

## 2022-10-20 RX ORDER — PACLITAXEL 100 MG/20ML
200 INJECTION, POWDER, LYOPHILIZED, FOR SUSPENSION INTRAVENOUS ONCE
Status: COMPLETED | OUTPATIENT
Start: 2022-10-20 | End: 2022-10-20

## 2022-10-20 RX ORDER — FAMOTIDINE 10 MG/ML
20 INJECTION, SOLUTION INTRAVENOUS ONCE
Status: COMPLETED | OUTPATIENT
Start: 2022-10-20 | End: 2022-10-20

## 2022-10-20 RX ORDER — DIPHENHYDRAMINE HYDROCHLORIDE 50 MG/ML
50 INJECTION INTRAMUSCULAR; INTRAVENOUS AS NEEDED
Status: DISCONTINUED | OUTPATIENT
Start: 2022-10-20 | End: 2022-10-20 | Stop reason: HOSPADM

## 2022-10-20 RX ORDER — HEPARIN SODIUM (PORCINE) LOCK FLUSH IV SOLN 100 UNIT/ML 100 UNIT/ML
500 SOLUTION INTRAVENOUS AS NEEDED
Status: CANCELLED | OUTPATIENT
Start: 2022-10-20

## 2022-10-20 RX ORDER — PALONOSETRON 0.05 MG/ML
0.25 INJECTION, SOLUTION INTRAVENOUS ONCE
Status: COMPLETED | OUTPATIENT
Start: 2022-10-20 | End: 2022-10-20

## 2022-10-20 RX ORDER — FAMOTIDINE 10 MG/ML
20 INJECTION, SOLUTION INTRAVENOUS AS NEEDED
Status: DISCONTINUED | OUTPATIENT
Start: 2022-10-20 | End: 2022-10-20 | Stop reason: HOSPADM

## 2022-10-20 RX ORDER — HEPARIN SODIUM (PORCINE) LOCK FLUSH IV SOLN 100 UNIT/ML 100 UNIT/ML
500 SOLUTION INTRAVENOUS AS NEEDED
Status: DISCONTINUED | OUTPATIENT
Start: 2022-10-20 | End: 2022-10-20 | Stop reason: HOSPADM

## 2022-10-20 RX ORDER — SODIUM CHLORIDE 9 MG/ML
250 INJECTION, SOLUTION INTRAVENOUS ONCE
Status: COMPLETED | OUTPATIENT
Start: 2022-10-20 | End: 2022-10-20

## 2022-10-20 RX ORDER — SODIUM CHLORIDE 0.9 % (FLUSH) 0.9 %
10 SYRINGE (ML) INJECTION AS NEEDED
Status: DISCONTINUED | OUTPATIENT
Start: 2022-10-20 | End: 2022-10-20 | Stop reason: HOSPADM

## 2022-10-20 RX ADMIN — Medication 10 ML: at 13:12

## 2022-10-20 RX ADMIN — DEXAMETHASONE SODIUM PHOSPHATE 12 MG: 10 INJECTION, SOLUTION INTRAMUSCULAR; INTRAVENOUS at 11:37

## 2022-10-20 RX ADMIN — SODIUM CHLORIDE 250 ML: 9 INJECTION, SOLUTION INTRAVENOUS at 11:17

## 2022-10-20 RX ADMIN — HEPARIN SODIUM (PORCINE) LOCK FLUSH IV SOLN 100 UNIT/ML 500 UNITS: 100 SOLUTION at 13:12

## 2022-10-20 RX ADMIN — PALONOSETRON HYDROCHLORIDE 0.25 MG: 0.25 INJECTION, SOLUTION INTRAVENOUS at 11:18

## 2022-10-20 RX ADMIN — FAMOTIDINE 20 MG: 10 INJECTION INTRAVENOUS at 11:18

## 2022-10-20 RX ADMIN — DIPHENHYDRAMINE HYDROCHLORIDE 50 MG: 50 INJECTION, SOLUTION INTRAMUSCULAR; INTRAVENOUS at 11:18

## 2022-10-20 RX ADMIN — PACLITAXEL 200 MG: 100 INJECTION, POWDER, LYOPHILIZED, FOR SUSPENSION INTRAVENOUS at 12:34

## 2022-10-20 RX ADMIN — SODIUM CHLORIDE 150 MG: 9 INJECTION, SOLUTION INTRAVENOUS at 11:54

## 2022-10-27 ENCOUNTER — LAB (OUTPATIENT)
Dept: LAB | Facility: HOSPITAL | Age: 62
End: 2022-10-27

## 2022-10-27 ENCOUNTER — TELEPHONE (OUTPATIENT)
Dept: ONCOLOGY | Facility: CLINIC | Age: 62
End: 2022-10-27

## 2022-10-27 ENCOUNTER — INFUSION (OUTPATIENT)
Dept: ONCOLOGY | Facility: HOSPITAL | Age: 62
End: 2022-10-27

## 2022-10-27 VITALS
DIASTOLIC BLOOD PRESSURE: 72 MMHG | BODY MASS INDEX: 35.79 KG/M2 | HEIGHT: 65 IN | TEMPERATURE: 97.1 F | HEART RATE: 64 BPM | WEIGHT: 214.8 LBS | RESPIRATION RATE: 20 BRPM | OXYGEN SATURATION: 99 % | SYSTOLIC BLOOD PRESSURE: 152 MMHG

## 2022-10-27 DIAGNOSIS — C54.1 ENDOMETRIAL CARCINOMA: ICD-10-CM

## 2022-10-27 LAB
ALBUMIN SERPL-MCNC: 3.8 G/DL (ref 3.5–5.2)
ALBUMIN/GLOB SERPL: 1.6 G/DL
ALP SERPL-CCNC: 57 U/L (ref 39–117)
ALT SERPL W P-5'-P-CCNC: 19 U/L (ref 1–33)
ANION GAP SERPL CALCULATED.3IONS-SCNC: 8 MMOL/L (ref 5–15)
ANISOCYTOSIS BLD QL: ABNORMAL
AST SERPL-CCNC: 14 U/L (ref 1–32)
BILIRUB SERPL-MCNC: 0.2 MG/DL (ref 0–1.2)
BUN SERPL-MCNC: 16 MG/DL (ref 8–23)
BUN/CREAT SERPL: 28.1 (ref 7–25)
CALCIUM SPEC-SCNC: 9.1 MG/DL (ref 8.6–10.5)
CANCER AG125 SERPL QL: 10.1 U/ML (ref 0–38.1)
CHLORIDE SERPL-SCNC: 104 MMOL/L (ref 98–107)
CO2 SERPL-SCNC: 26 MMOL/L (ref 22–29)
CREAT SERPL-MCNC: 0.57 MG/DL (ref 0.57–1)
DEPRECATED RDW RBC AUTO: 44 FL (ref 37–54)
EGFRCR SERPLBLD CKD-EPI 2021: 102.3 ML/MIN/1.73
ERYTHROCYTE [DISTWIDTH] IN BLOOD BY AUTOMATED COUNT: 13.8 % (ref 12.3–15.4)
GLOBULIN UR ELPH-MCNC: 2.4 GM/DL
GLUCOSE SERPL-MCNC: 109 MG/DL (ref 65–99)
HCT VFR BLD AUTO: 33 % (ref 34–46.6)
HGB BLD-MCNC: 11 G/DL (ref 12–15.9)
LYMPHOCYTES # BLD MANUAL: 0.33 10*3/MM3 (ref 0.7–3.1)
LYMPHOCYTES NFR BLD MANUAL: 10 % (ref 5–12)
MCH RBC QN AUTO: 30.1 PG (ref 26.6–33)
MCHC RBC AUTO-ENTMCNC: 33.3 G/DL (ref 31.5–35.7)
MCV RBC AUTO: 90.2 FL (ref 79–97)
MONOCYTES # BLD: 0.17 10*3/MM3 (ref 0.1–0.9)
NEUTROPHILS # BLD AUTO: 1.22 10*3/MM3 (ref 1.7–7)
NEUTROPHILS NFR BLD MANUAL: 69 % (ref 42.7–76)
NEUTS BAND NFR BLD MANUAL: 2 % (ref 0–5)
NRBC SPEC MANUAL: 2 /100 WBC (ref 0–0.2)
PLAT MORPH BLD: NORMAL
PLATELET # BLD AUTO: 136 10*3/MM3 (ref 140–450)
PMV BLD AUTO: 9.6 FL (ref 6–12)
POTASSIUM SERPL-SCNC: 3.9 MMOL/L (ref 3.5–5.2)
PROT SERPL-MCNC: 6.2 G/DL (ref 6–8.5)
RBC # BLD AUTO: 3.66 10*6/MM3 (ref 3.77–5.28)
SODIUM SERPL-SCNC: 138 MMOL/L (ref 136–145)
VARIANT LYMPHS NFR BLD MANUAL: 17 % (ref 19.6–45.3)
VARIANT LYMPHS NFR BLD MANUAL: 2 % (ref 0–5)
WBC MORPH BLD: NORMAL
WBC NRBC COR # BLD: 1.72 10*3/MM3 (ref 3.4–10.8)

## 2022-10-27 PROCEDURE — 36415 COLL VENOUS BLD VENIPUNCTURE: CPT

## 2022-10-27 PROCEDURE — 80053 COMPREHEN METABOLIC PANEL: CPT

## 2022-10-27 PROCEDURE — 85007 BL SMEAR W/DIFF WBC COUNT: CPT

## 2022-10-27 PROCEDURE — G0463 HOSPITAL OUTPT CLINIC VISIT: HCPCS

## 2022-10-27 PROCEDURE — 86304 IMMUNOASSAY TUMOR CA 125: CPT

## 2022-10-27 PROCEDURE — 85025 COMPLETE CBC W/AUTO DIFF WBC: CPT

## 2022-10-27 NOTE — PROGRESS NOTES
Rachel Sierra, [birth year] - dr murray. wbc critical 1.72, anc 1.22. Ok to tx? Maddie Pop response: Massiel on Rachel hold chemo this week recheck CBC in 1 week before treatment. Patient voiced understanding and also her concern about finishing her treatments before the end of the year due to insurance.

## 2022-10-27 NOTE — TELEPHONE ENCOUNTER
----- Message from Js Lima MD sent at 10/27/2022  9:17 AM CDT -----  ANC 1.22-hold chemo today.  Recheck CBC in 1 week and prior to chemo  ----- Message -----  From: Lab, Background User  Sent: 10/27/2022   8:40 AM CDT  To: Js Lima MD

## 2022-11-02 ENCOUNTER — OFFICE VISIT (OUTPATIENT)
Dept: ONCOLOGY | Facility: CLINIC | Age: 62
End: 2022-11-02

## 2022-11-02 ENCOUNTER — LAB (OUTPATIENT)
Dept: LAB | Facility: HOSPITAL | Age: 62
End: 2022-11-02

## 2022-11-02 VITALS
DIASTOLIC BLOOD PRESSURE: 90 MMHG | HEART RATE: 68 BPM | RESPIRATION RATE: 18 BRPM | WEIGHT: 213.3 LBS | TEMPERATURE: 97.2 F | BODY MASS INDEX: 35.54 KG/M2 | SYSTOLIC BLOOD PRESSURE: 162 MMHG | HEIGHT: 65 IN | OXYGEN SATURATION: 98 %

## 2022-11-02 DIAGNOSIS — C54.1 ENDOMETRIAL CARCINOMA: Primary | ICD-10-CM

## 2022-11-02 LAB
ALBUMIN SERPL-MCNC: 4.3 G/DL (ref 3.5–5.2)
ALBUMIN/GLOB SERPL: 1.6 G/DL
ALP SERPL-CCNC: 69 U/L (ref 39–117)
ALT SERPL W P-5'-P-CCNC: 22 U/L (ref 1–33)
ANION GAP SERPL CALCULATED.3IONS-SCNC: 11 MMOL/L (ref 5–15)
AST SERPL-CCNC: 16 U/L (ref 1–32)
BASOPHILS # BLD AUTO: 0.01 10*3/MM3 (ref 0–0.2)
BASOPHILS NFR BLD AUTO: 0.3 % (ref 0–1.5)
BILIRUB SERPL-MCNC: 0.2 MG/DL (ref 0–1.2)
BUN SERPL-MCNC: 18 MG/DL (ref 8–23)
BUN/CREAT SERPL: 32.7 (ref 7–25)
CALCIUM SPEC-SCNC: 9.7 MG/DL (ref 8.6–10.5)
CHLORIDE SERPL-SCNC: 103 MMOL/L (ref 98–107)
CO2 SERPL-SCNC: 26 MMOL/L (ref 22–29)
CREAT SERPL-MCNC: 0.55 MG/DL (ref 0.57–1)
DEPRECATED RDW RBC AUTO: 49.4 FL (ref 37–54)
EGFRCR SERPLBLD CKD-EPI 2021: 102.5 ML/MIN/1.73
EOSINOPHIL # BLD AUTO: 0 10*3/MM3 (ref 0–0.4)
EOSINOPHIL NFR BLD AUTO: 0 % (ref 0.3–6.2)
ERYTHROCYTE [DISTWIDTH] IN BLOOD BY AUTOMATED COUNT: 16.2 % (ref 12.3–15.4)
GLOBULIN UR ELPH-MCNC: 2.7 GM/DL
GLUCOSE SERPL-MCNC: 129 MG/DL (ref 65–99)
HCT VFR BLD AUTO: 36.4 % (ref 34–46.6)
HGB BLD-MCNC: 12.4 G/DL (ref 12–15.9)
HOLD SPECIMEN: NORMAL
IMM GRANULOCYTES # BLD AUTO: 0.06 10*3/MM3 (ref 0–0.05)
IMM GRANULOCYTES NFR BLD AUTO: 1.5 % (ref 0–0.5)
LYMPHOCYTES # BLD AUTO: 0.34 10*3/MM3 (ref 0.7–3.1)
LYMPHOCYTES NFR BLD AUTO: 8.6 % (ref 19.6–45.3)
MCH RBC QN AUTO: 30.9 PG (ref 26.6–33)
MCHC RBC AUTO-ENTMCNC: 34.1 G/DL (ref 31.5–35.7)
MCV RBC AUTO: 90.8 FL (ref 79–97)
MONOCYTES # BLD AUTO: 0.17 10*3/MM3 (ref 0.1–0.9)
MONOCYTES NFR BLD AUTO: 4.3 % (ref 5–12)
NEUTROPHILS NFR BLD AUTO: 3.36 10*3/MM3 (ref 1.7–7)
NEUTROPHILS NFR BLD AUTO: 85.3 % (ref 42.7–76)
NRBC BLD AUTO-RTO: 0.5 /100 WBC (ref 0–0.2)
PLATELET # BLD AUTO: 176 10*3/MM3 (ref 140–450)
PMV BLD AUTO: 9.3 FL (ref 6–12)
POTASSIUM SERPL-SCNC: 4 MMOL/L (ref 3.5–5.2)
PROT SERPL-MCNC: 7 G/DL (ref 6–8.5)
RBC # BLD AUTO: 4.01 10*6/MM3 (ref 3.77–5.28)
SODIUM SERPL-SCNC: 140 MMOL/L (ref 136–145)
WBC NRBC COR # BLD: 3.94 10*3/MM3 (ref 3.4–10.8)

## 2022-11-02 PROCEDURE — 36415 COLL VENOUS BLD VENIPUNCTURE: CPT

## 2022-11-02 PROCEDURE — 80053 COMPREHEN METABOLIC PANEL: CPT

## 2022-11-02 PROCEDURE — 99215 OFFICE O/P EST HI 40 MIN: CPT | Performed by: INTERNAL MEDICINE

## 2022-11-02 PROCEDURE — 85025 COMPLETE CBC W/AUTO DIFF WBC: CPT

## 2022-11-03 ENCOUNTER — INFUSION (OUTPATIENT)
Dept: ONCOLOGY | Facility: HOSPITAL | Age: 62
End: 2022-11-03

## 2022-11-03 VITALS
BODY MASS INDEX: 34.23 KG/M2 | WEIGHT: 213 LBS | TEMPERATURE: 97.7 F | RESPIRATION RATE: 20 BRPM | HEIGHT: 66 IN | DIASTOLIC BLOOD PRESSURE: 85 MMHG | SYSTOLIC BLOOD PRESSURE: 128 MMHG | HEART RATE: 54 BPM | OXYGEN SATURATION: 98 %

## 2022-11-03 DIAGNOSIS — Z45.2 FITTING AND ADJUSTMENT OF VASCULAR CATHETER: ICD-10-CM

## 2022-11-03 DIAGNOSIS — C54.1 ENDOMETRIAL CARCINOMA: Primary | ICD-10-CM

## 2022-11-03 PROCEDURE — 25010000002 DIPHENHYDRAMINE PER 50 MG: Performed by: INTERNAL MEDICINE

## 2022-11-03 PROCEDURE — 25010000002 PACLITAXEL PROTEIN-BOUND PART PER 1 MG: Performed by: INTERNAL MEDICINE

## 2022-11-03 PROCEDURE — 25010000002 PALONOSETRON PER 25 MCG: Performed by: INTERNAL MEDICINE

## 2022-11-03 PROCEDURE — 96375 TX/PRO/DX INJ NEW DRUG ADDON: CPT

## 2022-11-03 PROCEDURE — 25010000002 DEXAMETHASONE SODIUM PHOSPHATE 100 MG/10ML SOLUTION: Performed by: INTERNAL MEDICINE

## 2022-11-03 PROCEDURE — 96367 TX/PROPH/DG ADDL SEQ IV INF: CPT

## 2022-11-03 PROCEDURE — 25010000002 FOSAPREPITANT PER 1 MG: Performed by: INTERNAL MEDICINE

## 2022-11-03 PROCEDURE — 25010000002 HEPARIN LOCK FLUSH PER 10 UNITS: Performed by: OBSTETRICS & GYNECOLOGY

## 2022-11-03 PROCEDURE — 96413 CHEMO IV INFUSION 1 HR: CPT

## 2022-11-03 RX ORDER — PALONOSETRON 0.05 MG/ML
0.25 INJECTION, SOLUTION INTRAVENOUS ONCE
Status: COMPLETED | OUTPATIENT
Start: 2022-11-03 | End: 2022-11-03

## 2022-11-03 RX ORDER — PACLITAXEL 100 MG/20ML
200 INJECTION, POWDER, LYOPHILIZED, FOR SUSPENSION INTRAVENOUS ONCE
Status: COMPLETED | OUTPATIENT
Start: 2022-11-03 | End: 2022-11-03

## 2022-11-03 RX ORDER — SODIUM CHLORIDE 0.9 % (FLUSH) 0.9 %
10 SYRINGE (ML) INJECTION AS NEEDED
Status: DISCONTINUED | OUTPATIENT
Start: 2022-11-03 | End: 2022-11-03 | Stop reason: HOSPADM

## 2022-11-03 RX ORDER — SODIUM CHLORIDE 0.9 % (FLUSH) 0.9 %
10 SYRINGE (ML) INJECTION AS NEEDED
Status: CANCELLED | OUTPATIENT
Start: 2022-11-03

## 2022-11-03 RX ORDER — HEPARIN SODIUM (PORCINE) LOCK FLUSH IV SOLN 100 UNIT/ML 100 UNIT/ML
500 SOLUTION INTRAVENOUS AS NEEDED
Status: DISCONTINUED | OUTPATIENT
Start: 2022-11-03 | End: 2022-11-03 | Stop reason: HOSPADM

## 2022-11-03 RX ORDER — FAMOTIDINE 10 MG/ML
20 INJECTION, SOLUTION INTRAVENOUS AS NEEDED
Status: DISCONTINUED | OUTPATIENT
Start: 2022-11-03 | End: 2022-11-03 | Stop reason: HOSPADM

## 2022-11-03 RX ORDER — FAMOTIDINE 10 MG/ML
20 INJECTION, SOLUTION INTRAVENOUS ONCE
Status: COMPLETED | OUTPATIENT
Start: 2022-11-03 | End: 2022-11-03

## 2022-11-03 RX ORDER — HEPARIN SODIUM (PORCINE) LOCK FLUSH IV SOLN 100 UNIT/ML 100 UNIT/ML
500 SOLUTION INTRAVENOUS AS NEEDED
Status: CANCELLED | OUTPATIENT
Start: 2022-11-03

## 2022-11-03 RX ORDER — SODIUM CHLORIDE 9 MG/ML
250 INJECTION, SOLUTION INTRAVENOUS ONCE
Status: COMPLETED | OUTPATIENT
Start: 2022-11-03 | End: 2022-11-03

## 2022-11-03 RX ORDER — DIPHENHYDRAMINE HYDROCHLORIDE 50 MG/ML
50 INJECTION INTRAMUSCULAR; INTRAVENOUS AS NEEDED
Status: DISCONTINUED | OUTPATIENT
Start: 2022-11-03 | End: 2022-11-03 | Stop reason: HOSPADM

## 2022-11-03 RX ADMIN — FAMOTIDINE 20 MG: 10 INJECTION INTRAVENOUS at 09:51

## 2022-11-03 RX ADMIN — DEXAMETHASONE SODIUM PHOSPHATE 12 MG: 10 INJECTION, SOLUTION INTRAMUSCULAR; INTRAVENOUS at 09:21

## 2022-11-03 RX ADMIN — DIPHENHYDRAMINE HYDROCHLORIDE 50 MG: 50 INJECTION, SOLUTION INTRAMUSCULAR; INTRAVENOUS at 09:53

## 2022-11-03 RX ADMIN — PALONOSETRON HYDROCHLORIDE 0.25 MG: 0.25 INJECTION, SOLUTION INTRAVENOUS at 09:49

## 2022-11-03 RX ADMIN — PACLITAXEL 200 MG: 100 INJECTION, POWDER, LYOPHILIZED, FOR SUSPENSION INTRAVENOUS at 10:58

## 2022-11-03 RX ADMIN — SODIUM CHLORIDE 250 ML: 9 INJECTION, SOLUTION INTRAVENOUS at 09:01

## 2022-11-03 RX ADMIN — FOSAPREPITANT 150 MG: 150 INJECTION, POWDER, LYOPHILIZED, FOR SOLUTION INTRAVENOUS at 10:14

## 2022-11-03 RX ADMIN — Medication 10 ML: at 11:51

## 2022-11-03 RX ADMIN — HEPARIN SODIUM (PORCINE) LOCK FLUSH IV SOLN 100 UNIT/ML 500 UNITS: 100 SOLUTION at 11:52

## 2022-11-04 RX ORDER — SODIUM CHLORIDE 9 MG/ML
250 INJECTION, SOLUTION INTRAVENOUS ONCE
Status: CANCELLED | OUTPATIENT
Start: 2022-11-10

## 2022-11-04 RX ORDER — DIPHENHYDRAMINE HYDROCHLORIDE 50 MG/ML
50 INJECTION INTRAMUSCULAR; INTRAVENOUS AS NEEDED
Status: CANCELLED | OUTPATIENT
Start: 2022-11-17

## 2022-11-04 RX ORDER — FAMOTIDINE 10 MG/ML
20 INJECTION, SOLUTION INTRAVENOUS AS NEEDED
Status: CANCELLED | OUTPATIENT
Start: 2022-11-17

## 2022-11-04 RX ORDER — DIPHENHYDRAMINE HYDROCHLORIDE 50 MG/ML
50 INJECTION INTRAMUSCULAR; INTRAVENOUS AS NEEDED
Status: CANCELLED | OUTPATIENT
Start: 2022-12-01

## 2022-11-04 RX ORDER — SODIUM CHLORIDE 9 MG/ML
250 INJECTION, SOLUTION INTRAVENOUS ONCE
Status: CANCELLED | OUTPATIENT
Start: 2022-11-17

## 2022-11-04 RX ORDER — PALONOSETRON 0.05 MG/ML
0.25 INJECTION, SOLUTION INTRAVENOUS ONCE
Status: CANCELLED | OUTPATIENT
Start: 2022-11-10

## 2022-11-04 RX ORDER — FAMOTIDINE 10 MG/ML
20 INJECTION, SOLUTION INTRAVENOUS AS NEEDED
Status: CANCELLED | OUTPATIENT
Start: 2022-11-10

## 2022-11-04 RX ORDER — PALONOSETRON 0.05 MG/ML
0.25 INJECTION, SOLUTION INTRAVENOUS ONCE
Status: CANCELLED | OUTPATIENT
Start: 2022-11-17

## 2022-11-04 RX ORDER — DIPHENHYDRAMINE HYDROCHLORIDE 50 MG/ML
50 INJECTION INTRAMUSCULAR; INTRAVENOUS AS NEEDED
Status: CANCELLED | OUTPATIENT
Start: 2022-11-10

## 2022-11-04 RX ORDER — FAMOTIDINE 10 MG/ML
20 INJECTION, SOLUTION INTRAVENOUS AS NEEDED
Status: CANCELLED | OUTPATIENT
Start: 2022-12-01

## 2022-11-04 RX ORDER — FAMOTIDINE 10 MG/ML
20 INJECTION, SOLUTION INTRAVENOUS ONCE
Status: CANCELLED | OUTPATIENT
Start: 2022-11-17 | End: 2022-11-17

## 2022-11-04 RX ORDER — FAMOTIDINE 10 MG/ML
20 INJECTION, SOLUTION INTRAVENOUS ONCE
Status: CANCELLED | OUTPATIENT
Start: 2022-12-01 | End: 2022-11-24

## 2022-11-04 RX ORDER — PACLITAXEL 100 MG/20ML
100 INJECTION, POWDER, LYOPHILIZED, FOR SUSPENSION INTRAVENOUS ONCE
Status: CANCELLED
Start: 2022-11-17

## 2022-11-04 RX ORDER — SODIUM CHLORIDE 9 MG/ML
250 INJECTION, SOLUTION INTRAVENOUS ONCE
Status: CANCELLED | OUTPATIENT
Start: 2022-12-01

## 2022-11-04 RX ORDER — FAMOTIDINE 10 MG/ML
20 INJECTION, SOLUTION INTRAVENOUS ONCE
Status: CANCELLED
Start: 2022-11-10 | End: 2022-11-10

## 2022-11-04 RX ORDER — PACLITAXEL 100 MG/20ML
100 INJECTION, POWDER, LYOPHILIZED, FOR SUSPENSION INTRAVENOUS ONCE
Status: CANCELLED
Start: 2022-11-10

## 2022-11-04 RX ORDER — PACLITAXEL 100 MG/20ML
100 INJECTION, POWDER, LYOPHILIZED, FOR SUSPENSION INTRAVENOUS ONCE
Status: CANCELLED
Start: 2022-12-01

## 2022-11-04 RX ORDER — PALONOSETRON 0.05 MG/ML
0.25 INJECTION, SOLUTION INTRAVENOUS ONCE
Status: CANCELLED | OUTPATIENT
Start: 2022-12-01

## 2022-11-10 ENCOUNTER — LAB (OUTPATIENT)
Dept: LAB | Facility: HOSPITAL | Age: 62
End: 2022-11-10

## 2022-11-10 ENCOUNTER — INFUSION (OUTPATIENT)
Dept: ONCOLOGY | Facility: HOSPITAL | Age: 62
End: 2022-11-10

## 2022-11-10 VITALS
WEIGHT: 211 LBS | HEIGHT: 66 IN | TEMPERATURE: 97 F | DIASTOLIC BLOOD PRESSURE: 65 MMHG | BODY MASS INDEX: 33.91 KG/M2 | OXYGEN SATURATION: 100 % | HEART RATE: 51 BPM | SYSTOLIC BLOOD PRESSURE: 126 MMHG | RESPIRATION RATE: 18 BRPM

## 2022-11-10 DIAGNOSIS — C54.1 ENDOMETRIAL CARCINOMA: ICD-10-CM

## 2022-11-10 DIAGNOSIS — Z45.2 FITTING AND ADJUSTMENT OF VASCULAR CATHETER: ICD-10-CM

## 2022-11-10 DIAGNOSIS — C54.1 ENDOMETRIAL CARCINOMA: Primary | ICD-10-CM

## 2022-11-10 LAB
ALBUMIN SERPL-MCNC: 4.3 G/DL (ref 3.5–5.2)
ALBUMIN/GLOB SERPL: 2 G/DL
ALP SERPL-CCNC: 62 U/L (ref 39–117)
ALT SERPL W P-5'-P-CCNC: 24 U/L (ref 1–33)
ANION GAP SERPL CALCULATED.3IONS-SCNC: 10 MMOL/L (ref 5–15)
AST SERPL-CCNC: 17 U/L (ref 1–32)
BASOPHILS # BLD AUTO: 0.04 10*3/MM3 (ref 0–0.2)
BASOPHILS NFR BLD AUTO: 0.7 % (ref 0–1.5)
BILIRUB SERPL-MCNC: 0.3 MG/DL (ref 0–1.2)
BUN SERPL-MCNC: 19 MG/DL (ref 8–23)
BUN/CREAT SERPL: 26.4 (ref 7–25)
CALCIUM SPEC-SCNC: 9.7 MG/DL (ref 8.6–10.5)
CHLORIDE SERPL-SCNC: 101 MMOL/L (ref 98–107)
CO2 SERPL-SCNC: 29 MMOL/L (ref 22–29)
CREAT SERPL-MCNC: 0.72 MG/DL (ref 0.57–1)
DEPRECATED RDW RBC AUTO: 53.3 FL (ref 37–54)
EGFRCR SERPLBLD CKD-EPI 2021: 93.5 ML/MIN/1.73
EOSINOPHIL # BLD AUTO: 0 10*3/MM3 (ref 0–0.4)
EOSINOPHIL NFR BLD AUTO: 0 % (ref 0.3–6.2)
ERYTHROCYTE [DISTWIDTH] IN BLOOD BY AUTOMATED COUNT: 16.7 % (ref 12.3–15.4)
GLOBULIN UR ELPH-MCNC: 2.2 GM/DL
GLUCOSE SERPL-MCNC: 114 MG/DL (ref 65–99)
HCT VFR BLD AUTO: 37 % (ref 34–46.6)
HGB BLD-MCNC: 12.6 G/DL (ref 12–15.9)
IMM GRANULOCYTES # BLD AUTO: 0.29 10*3/MM3 (ref 0–0.05)
IMM GRANULOCYTES NFR BLD AUTO: 5 % (ref 0–0.5)
LYMPHOCYTES # BLD AUTO: 0.54 10*3/MM3 (ref 0.7–3.1)
LYMPHOCYTES NFR BLD AUTO: 9.3 % (ref 19.6–45.3)
MCH RBC QN AUTO: 31 PG (ref 26.6–33)
MCHC RBC AUTO-ENTMCNC: 34.1 G/DL (ref 31.5–35.7)
MCV RBC AUTO: 90.9 FL (ref 79–97)
MONOCYTES # BLD AUTO: 0.43 10*3/MM3 (ref 0.1–0.9)
MONOCYTES NFR BLD AUTO: 7.4 % (ref 5–12)
NEUTROPHILS NFR BLD AUTO: 4.51 10*3/MM3 (ref 1.7–7)
NEUTROPHILS NFR BLD AUTO: 77.6 % (ref 42.7–76)
NRBC BLD AUTO-RTO: 1.9 /100 WBC (ref 0–0.2)
PLATELET # BLD AUTO: 246 10*3/MM3 (ref 140–450)
PMV BLD AUTO: 9.5 FL (ref 6–12)
POTASSIUM SERPL-SCNC: 4.5 MMOL/L (ref 3.5–5.2)
PROT SERPL-MCNC: 6.5 G/DL (ref 6–8.5)
RBC # BLD AUTO: 4.07 10*6/MM3 (ref 3.77–5.28)
SODIUM SERPL-SCNC: 140 MMOL/L (ref 136–145)
WBC NRBC COR # BLD: 5.81 10*3/MM3 (ref 3.4–10.8)

## 2022-11-10 PROCEDURE — 96367 TX/PROPH/DG ADDL SEQ IV INF: CPT

## 2022-11-10 PROCEDURE — 25010000002 CARBOPLATIN PER 50 MG: Performed by: INTERNAL MEDICINE

## 2022-11-10 PROCEDURE — 25010000002 PACLITAXEL PROTEIN-BOUND PART PER 1 MG: Performed by: INTERNAL MEDICINE

## 2022-11-10 PROCEDURE — 25010000002 FOSAPREPITANT PER 1 MG: Performed by: INTERNAL MEDICINE

## 2022-11-10 PROCEDURE — 25010000002 DEXAMETHASONE SODIUM PHOSPHATE 100 MG/10ML SOLUTION: Performed by: INTERNAL MEDICINE

## 2022-11-10 PROCEDURE — 96413 CHEMO IV INFUSION 1 HR: CPT

## 2022-11-10 PROCEDURE — 25010000002 DIPHENHYDRAMINE PER 50 MG: Performed by: INTERNAL MEDICINE

## 2022-11-10 PROCEDURE — 96417 CHEMO IV INFUS EACH ADDL SEQ: CPT

## 2022-11-10 PROCEDURE — 25010000002 HEPARIN LOCK FLUSH PER 10 UNITS: Performed by: OBSTETRICS & GYNECOLOGY

## 2022-11-10 PROCEDURE — 25010000002 PALONOSETRON PER 25 MCG: Performed by: INTERNAL MEDICINE

## 2022-11-10 PROCEDURE — 85025 COMPLETE CBC W/AUTO DIFF WBC: CPT

## 2022-11-10 PROCEDURE — 96375 TX/PRO/DX INJ NEW DRUG ADDON: CPT

## 2022-11-10 PROCEDURE — 96366 THER/PROPH/DIAG IV INF ADDON: CPT

## 2022-11-10 PROCEDURE — 36415 COLL VENOUS BLD VENIPUNCTURE: CPT

## 2022-11-10 PROCEDURE — 80053 COMPREHEN METABOLIC PANEL: CPT

## 2022-11-10 RX ORDER — FAMOTIDINE 10 MG/ML
20 INJECTION, SOLUTION INTRAVENOUS AS NEEDED
Status: DISCONTINUED | OUTPATIENT
Start: 2022-11-10 | End: 2022-11-10 | Stop reason: HOSPADM

## 2022-11-10 RX ORDER — PALONOSETRON 0.05 MG/ML
0.25 INJECTION, SOLUTION INTRAVENOUS ONCE
Status: COMPLETED | OUTPATIENT
Start: 2022-11-10 | End: 2022-11-10

## 2022-11-10 RX ORDER — FAMOTIDINE 10 MG/ML
20 INJECTION, SOLUTION INTRAVENOUS ONCE
Status: COMPLETED | OUTPATIENT
Start: 2022-11-10 | End: 2022-11-10

## 2022-11-10 RX ORDER — SODIUM CHLORIDE 0.9 % (FLUSH) 0.9 %
10 SYRINGE (ML) INJECTION AS NEEDED
Status: CANCELLED | OUTPATIENT
Start: 2022-11-10

## 2022-11-10 RX ORDER — PACLITAXEL 100 MG/20ML
200 INJECTION, POWDER, LYOPHILIZED, FOR SUSPENSION INTRAVENOUS ONCE
Status: COMPLETED | OUTPATIENT
Start: 2022-11-10 | End: 2022-11-10

## 2022-11-10 RX ORDER — SODIUM CHLORIDE 9 MG/ML
250 INJECTION, SOLUTION INTRAVENOUS ONCE
Status: COMPLETED | OUTPATIENT
Start: 2022-11-10 | End: 2022-11-10

## 2022-11-10 RX ORDER — SODIUM CHLORIDE 0.9 % (FLUSH) 0.9 %
10 SYRINGE (ML) INJECTION AS NEEDED
Status: DISCONTINUED | OUTPATIENT
Start: 2022-11-10 | End: 2022-11-10 | Stop reason: HOSPADM

## 2022-11-10 RX ORDER — HEPARIN SODIUM (PORCINE) LOCK FLUSH IV SOLN 100 UNIT/ML 100 UNIT/ML
500 SOLUTION INTRAVENOUS AS NEEDED
Status: DISCONTINUED | OUTPATIENT
Start: 2022-11-10 | End: 2022-11-10 | Stop reason: HOSPADM

## 2022-11-10 RX ORDER — DIPHENHYDRAMINE HYDROCHLORIDE 50 MG/ML
50 INJECTION INTRAMUSCULAR; INTRAVENOUS AS NEEDED
Status: DISCONTINUED | OUTPATIENT
Start: 2022-11-10 | End: 2022-11-10 | Stop reason: HOSPADM

## 2022-11-10 RX ORDER — HEPARIN SODIUM (PORCINE) LOCK FLUSH IV SOLN 100 UNIT/ML 100 UNIT/ML
500 SOLUTION INTRAVENOUS AS NEEDED
Status: CANCELLED | OUTPATIENT
Start: 2022-11-10

## 2022-11-10 RX ADMIN — FAMOTIDINE 20 MG: 10 INJECTION INTRAVENOUS at 09:52

## 2022-11-10 RX ADMIN — SODIUM CHLORIDE 250 ML: 9 INJECTION, SOLUTION INTRAVENOUS at 09:52

## 2022-11-10 RX ADMIN — SODIUM CHLORIDE 150 MG: 9 INJECTION, SOLUTION INTRAVENOUS at 10:35

## 2022-11-10 RX ADMIN — Medication 10 ML: at 12:19

## 2022-11-10 RX ADMIN — DIPHENHYDRAMINE HYDROCHLORIDE 50 MG: 50 INJECTION, SOLUTION INTRAMUSCULAR; INTRAVENOUS at 10:17

## 2022-11-10 RX ADMIN — PACLITAXEL 200 MG: 100 INJECTION, POWDER, LYOPHILIZED, FOR SUSPENSION INTRAVENOUS at 11:09

## 2022-11-10 RX ADMIN — PALONOSETRON HYDROCHLORIDE 0.25 MG: 0.25 INJECTION, SOLUTION INTRAVENOUS at 09:53

## 2022-11-10 RX ADMIN — CARBOPLATIN 600 MG: 10 INJECTION, SOLUTION INTRAVENOUS at 11:39

## 2022-11-10 RX ADMIN — DEXAMETHASONE SODIUM PHOSPHATE 20 MG: 10 INJECTION, SOLUTION INTRAMUSCULAR; INTRAVENOUS at 09:58

## 2022-11-10 RX ADMIN — HEPARIN SODIUM (PORCINE) LOCK FLUSH IV SOLN 100 UNIT/ML 500 UNITS: 100 SOLUTION at 12:19

## 2022-11-17 ENCOUNTER — LAB (OUTPATIENT)
Dept: LAB | Facility: HOSPITAL | Age: 62
End: 2022-11-17

## 2022-11-17 ENCOUNTER — INFUSION (OUTPATIENT)
Dept: ONCOLOGY | Facility: HOSPITAL | Age: 62
End: 2022-11-17

## 2022-11-17 VITALS
HEIGHT: 66 IN | SYSTOLIC BLOOD PRESSURE: 122 MMHG | HEART RATE: 59 BPM | DIASTOLIC BLOOD PRESSURE: 72 MMHG | OXYGEN SATURATION: 100 % | TEMPERATURE: 97 F | RESPIRATION RATE: 18 BRPM | WEIGHT: 212 LBS | BODY MASS INDEX: 34.07 KG/M2

## 2022-11-17 DIAGNOSIS — C54.1 ENDOMETRIAL CARCINOMA: ICD-10-CM

## 2022-11-17 DIAGNOSIS — C54.1 ENDOMETRIAL CARCINOMA: Primary | ICD-10-CM

## 2022-11-17 DIAGNOSIS — Z45.2 FITTING AND ADJUSTMENT OF VASCULAR CATHETER: ICD-10-CM

## 2022-11-17 LAB
ALBUMIN SERPL-MCNC: 4.3 G/DL (ref 3.5–5.2)
ALBUMIN/GLOB SERPL: 1.9 G/DL
ALP SERPL-CCNC: 58 U/L (ref 39–117)
ALT SERPL W P-5'-P-CCNC: 24 U/L (ref 1–33)
ANION GAP SERPL CALCULATED.3IONS-SCNC: 10 MMOL/L (ref 5–15)
AST SERPL-CCNC: 17 U/L (ref 1–32)
BASOPHILS # BLD AUTO: 0.01 10*3/MM3 (ref 0–0.2)
BASOPHILS NFR BLD AUTO: 0.4 % (ref 0–1.5)
BILIRUB SERPL-MCNC: 0.4 MG/DL (ref 0–1.2)
BUN SERPL-MCNC: 17 MG/DL (ref 8–23)
BUN/CREAT SERPL: 30.9 (ref 7–25)
CALCIUM SPEC-SCNC: 9.9 MG/DL (ref 8.6–10.5)
CHLORIDE SERPL-SCNC: 98 MMOL/L (ref 98–107)
CO2 SERPL-SCNC: 30 MMOL/L (ref 22–29)
CREAT SERPL-MCNC: 0.55 MG/DL (ref 0.57–1)
DEPRECATED RDW RBC AUTO: 54.8 FL (ref 37–54)
EGFRCR SERPLBLD CKD-EPI 2021: 102.5 ML/MIN/1.73
EOSINOPHIL # BLD AUTO: 0 10*3/MM3 (ref 0–0.4)
EOSINOPHIL NFR BLD AUTO: 0 % (ref 0.3–6.2)
ERYTHROCYTE [DISTWIDTH] IN BLOOD BY AUTOMATED COUNT: 16.4 % (ref 12.3–15.4)
GLOBULIN UR ELPH-MCNC: 2.3 GM/DL
GLUCOSE SERPL-MCNC: 115 MG/DL (ref 65–99)
HCT VFR BLD AUTO: 35.6 % (ref 34–46.6)
HGB BLD-MCNC: 11.5 G/DL (ref 12–15.9)
IMM GRANULOCYTES # BLD AUTO: 0.03 10*3/MM3 (ref 0–0.05)
IMM GRANULOCYTES NFR BLD AUTO: 1.3 % (ref 0–0.5)
LYMPHOCYTES # BLD AUTO: 0.34 10*3/MM3 (ref 0.7–3.1)
LYMPHOCYTES NFR BLD AUTO: 14.6 % (ref 19.6–45.3)
MCH RBC QN AUTO: 30 PG (ref 26.6–33)
MCHC RBC AUTO-ENTMCNC: 32.3 G/DL (ref 31.5–35.7)
MCV RBC AUTO: 93 FL (ref 79–97)
MONOCYTES # BLD AUTO: 0.18 10*3/MM3 (ref 0.1–0.9)
MONOCYTES NFR BLD AUTO: 7.7 % (ref 5–12)
NEUTROPHILS NFR BLD AUTO: 1.77 10*3/MM3 (ref 1.7–7)
NEUTROPHILS NFR BLD AUTO: 76 % (ref 42.7–76)
NRBC BLD AUTO-RTO: 0 /100 WBC (ref 0–0.2)
PLATELET # BLD AUTO: 164 10*3/MM3 (ref 140–450)
PMV BLD AUTO: 9.9 FL (ref 6–12)
POTASSIUM SERPL-SCNC: 4.4 MMOL/L (ref 3.5–5.2)
PROT SERPL-MCNC: 6.6 G/DL (ref 6–8.5)
RBC # BLD AUTO: 3.83 10*6/MM3 (ref 3.77–5.28)
SODIUM SERPL-SCNC: 138 MMOL/L (ref 136–145)
WBC NRBC COR # BLD: 2.33 10*3/MM3 (ref 3.4–10.8)

## 2022-11-17 PROCEDURE — 96375 TX/PRO/DX INJ NEW DRUG ADDON: CPT

## 2022-11-17 PROCEDURE — 25010000002 FOSAPREPITANT PER 1 MG: Performed by: INTERNAL MEDICINE

## 2022-11-17 PROCEDURE — 25010000002 PALONOSETRON PER 25 MCG: Performed by: INTERNAL MEDICINE

## 2022-11-17 PROCEDURE — 36415 COLL VENOUS BLD VENIPUNCTURE: CPT

## 2022-11-17 PROCEDURE — 80053 COMPREHEN METABOLIC PANEL: CPT

## 2022-11-17 PROCEDURE — 25010000002 PACLITAXEL PROTEIN-BOUND PART PER 1 MG: Performed by: INTERNAL MEDICINE

## 2022-11-17 PROCEDURE — 25010000002 HEPARIN LOCK FLUSH PER 10 UNITS: Performed by: OBSTETRICS & GYNECOLOGY

## 2022-11-17 PROCEDURE — 25010000002 DIPHENHYDRAMINE PER 50 MG: Performed by: INTERNAL MEDICINE

## 2022-11-17 PROCEDURE — 96413 CHEMO IV INFUSION 1 HR: CPT

## 2022-11-17 PROCEDURE — 25010000002 DEXAMETHASONE SODIUM PHOSPHATE 100 MG/10ML SOLUTION: Performed by: INTERNAL MEDICINE

## 2022-11-17 PROCEDURE — 85025 COMPLETE CBC W/AUTO DIFF WBC: CPT

## 2022-11-17 PROCEDURE — 96367 TX/PROPH/DG ADDL SEQ IV INF: CPT

## 2022-11-17 RX ORDER — FAMOTIDINE 10 MG/ML
20 INJECTION, SOLUTION INTRAVENOUS ONCE
Status: COMPLETED | OUTPATIENT
Start: 2022-11-17 | End: 2022-11-17

## 2022-11-17 RX ORDER — PALONOSETRON 0.05 MG/ML
0.25 INJECTION, SOLUTION INTRAVENOUS ONCE
Status: COMPLETED | OUTPATIENT
Start: 2022-11-17 | End: 2022-11-17

## 2022-11-17 RX ORDER — DIPHENHYDRAMINE HYDROCHLORIDE 50 MG/ML
50 INJECTION INTRAMUSCULAR; INTRAVENOUS AS NEEDED
Status: DISCONTINUED | OUTPATIENT
Start: 2022-11-17 | End: 2022-11-17 | Stop reason: HOSPADM

## 2022-11-17 RX ORDER — HEPARIN SODIUM (PORCINE) LOCK FLUSH IV SOLN 100 UNIT/ML 100 UNIT/ML
500 SOLUTION INTRAVENOUS AS NEEDED
Status: DISCONTINUED | OUTPATIENT
Start: 2022-11-17 | End: 2022-11-17 | Stop reason: HOSPADM

## 2022-11-17 RX ORDER — PACLITAXEL 100 MG/20ML
200 INJECTION, POWDER, LYOPHILIZED, FOR SUSPENSION INTRAVENOUS ONCE
Status: COMPLETED | OUTPATIENT
Start: 2022-11-17 | End: 2022-11-17

## 2022-11-17 RX ORDER — SODIUM CHLORIDE 0.9 % (FLUSH) 0.9 %
10 SYRINGE (ML) INJECTION AS NEEDED
Status: DISCONTINUED | OUTPATIENT
Start: 2022-11-17 | End: 2022-11-17 | Stop reason: HOSPADM

## 2022-11-17 RX ORDER — SODIUM CHLORIDE 9 MG/ML
250 INJECTION, SOLUTION INTRAVENOUS ONCE
Status: COMPLETED | OUTPATIENT
Start: 2022-11-17 | End: 2022-11-17

## 2022-11-17 RX ORDER — HEPARIN SODIUM (PORCINE) LOCK FLUSH IV SOLN 100 UNIT/ML 100 UNIT/ML
500 SOLUTION INTRAVENOUS AS NEEDED
Status: CANCELLED | OUTPATIENT
Start: 2022-11-17

## 2022-11-17 RX ORDER — FAMOTIDINE 10 MG/ML
20 INJECTION, SOLUTION INTRAVENOUS AS NEEDED
Status: DISCONTINUED | OUTPATIENT
Start: 2022-11-17 | End: 2022-11-17 | Stop reason: HOSPADM

## 2022-11-17 RX ORDER — SODIUM CHLORIDE 0.9 % (FLUSH) 0.9 %
10 SYRINGE (ML) INJECTION AS NEEDED
Status: CANCELLED | OUTPATIENT
Start: 2022-11-17

## 2022-11-17 RX ADMIN — Medication 10 ML: at 11:16

## 2022-11-17 RX ADMIN — HEPARIN SODIUM (PORCINE) LOCK FLUSH IV SOLN 100 UNIT/ML 500 UNITS: 100 SOLUTION at 11:16

## 2022-11-17 RX ADMIN — SODIUM CHLORIDE 250 ML: 9 INJECTION, SOLUTION INTRAVENOUS at 09:01

## 2022-11-17 RX ADMIN — FAMOTIDINE 20 MG: 10 INJECTION INTRAVENOUS at 09:18

## 2022-11-17 RX ADMIN — PACLITAXEL 200 MG: 100 INJECTION, POWDER, LYOPHILIZED, FOR SUSPENSION INTRAVENOUS at 10:45

## 2022-11-17 RX ADMIN — DIPHENHYDRAMINE HYDROCHLORIDE 50 MG: 50 INJECTION, SOLUTION INTRAMUSCULAR; INTRAVENOUS at 09:39

## 2022-11-17 RX ADMIN — FOSAPREPITANT 150 MG: 150 INJECTION, POWDER, LYOPHILIZED, FOR SOLUTION INTRAVENOUS at 10:02

## 2022-11-17 RX ADMIN — PALONOSETRON HYDROCHLORIDE 0.25 MG: 0.25 INJECTION, SOLUTION INTRAVENOUS at 09:20

## 2022-11-17 RX ADMIN — DEXAMETHASONE SODIUM PHOSPHATE 12 MG: 10 INJECTION, SOLUTION INTRAMUSCULAR; INTRAVENOUS at 09:21

## 2022-11-30 PROBLEM — Z92.3 HISTORY OF RADIATION THERAPY: Status: ACTIVE | Noted: 2022-11-30

## 2022-12-01 ENCOUNTER — HOSPITAL ENCOUNTER (OUTPATIENT)
Dept: RADIATION ONCOLOGY | Facility: HOSPITAL | Age: 62
Setting detail: RADIATION/ONCOLOGY SERIES
End: 2022-12-01
Payer: COMMERCIAL

## 2022-12-01 ENCOUNTER — LAB (OUTPATIENT)
Dept: LAB | Facility: HOSPITAL | Age: 62
End: 2022-12-01

## 2022-12-01 ENCOUNTER — INFUSION (OUTPATIENT)
Dept: ONCOLOGY | Facility: HOSPITAL | Age: 62
End: 2022-12-01

## 2022-12-01 ENCOUNTER — OFFICE VISIT (OUTPATIENT)
Dept: RADIATION ONCOLOGY | Facility: HOSPITAL | Age: 62
End: 2022-12-01

## 2022-12-01 VITALS
RESPIRATION RATE: 18 BRPM | BODY MASS INDEX: 36.15 KG/M2 | HEART RATE: 64 BPM | HEIGHT: 65 IN | SYSTOLIC BLOOD PRESSURE: 164 MMHG | OXYGEN SATURATION: 99 % | WEIGHT: 217 LBS | DIASTOLIC BLOOD PRESSURE: 79 MMHG

## 2022-12-01 VITALS
TEMPERATURE: 97.1 F | DIASTOLIC BLOOD PRESSURE: 68 MMHG | HEART RATE: 51 BPM | WEIGHT: 217.6 LBS | RESPIRATION RATE: 18 BRPM | BODY MASS INDEX: 36.25 KG/M2 | HEIGHT: 65 IN | SYSTOLIC BLOOD PRESSURE: 134 MMHG | OXYGEN SATURATION: 100 %

## 2022-12-01 DIAGNOSIS — C54.1 ENDOMETRIAL CARCINOMA: ICD-10-CM

## 2022-12-01 DIAGNOSIS — Z90.722 S/P TAH-BSO (TOTAL ABDOMINAL HYSTERECTOMY AND BILATERAL SALPINGO-OOPHORECTOMY): ICD-10-CM

## 2022-12-01 DIAGNOSIS — Z92.3 HISTORY OF RADIATION THERAPY: ICD-10-CM

## 2022-12-01 DIAGNOSIS — Z90.79 S/P TAH-BSO (TOTAL ABDOMINAL HYSTERECTOMY AND BILATERAL SALPINGO-OOPHORECTOMY): ICD-10-CM

## 2022-12-01 DIAGNOSIS — Z45.2 FITTING AND ADJUSTMENT OF VASCULAR CATHETER: ICD-10-CM

## 2022-12-01 DIAGNOSIS — Z90.710 S/P TAH-BSO (TOTAL ABDOMINAL HYSTERECTOMY AND BILATERAL SALPINGO-OOPHORECTOMY): ICD-10-CM

## 2022-12-01 DIAGNOSIS — C54.1 ENDOMETRIAL CARCINOMA: Primary | ICD-10-CM

## 2022-12-01 DIAGNOSIS — Z78.9 NON-SMOKER: ICD-10-CM

## 2022-12-01 LAB
ALBUMIN SERPL-MCNC: 4.1 G/DL (ref 3.5–5.2)
ALBUMIN/GLOB SERPL: 1.6 G/DL
ALP SERPL-CCNC: 63 U/L (ref 39–117)
ALT SERPL W P-5'-P-CCNC: 21 U/L (ref 1–33)
ANION GAP SERPL CALCULATED.3IONS-SCNC: 10 MMOL/L (ref 5–15)
AST SERPL-CCNC: 20 U/L (ref 1–32)
BASOPHILS # BLD AUTO: 0.02 10*3/MM3 (ref 0–0.2)
BASOPHILS NFR BLD AUTO: 0.5 % (ref 0–1.5)
BILIRUB SERPL-MCNC: 0.3 MG/DL (ref 0–1.2)
BUN SERPL-MCNC: 19 MG/DL (ref 8–23)
BUN/CREAT SERPL: 32.2 (ref 7–25)
CALCIUM SPEC-SCNC: 9.5 MG/DL (ref 8.6–10.5)
CHLORIDE SERPL-SCNC: 103 MMOL/L (ref 98–107)
CO2 SERPL-SCNC: 27 MMOL/L (ref 22–29)
CREAT SERPL-MCNC: 0.59 MG/DL (ref 0.57–1)
DEPRECATED RDW RBC AUTO: 66.9 FL (ref 37–54)
EGFRCR SERPLBLD CKD-EPI 2021: 100.8 ML/MIN/1.73
EOSINOPHIL # BLD AUTO: 0 10*3/MM3 (ref 0–0.4)
EOSINOPHIL NFR BLD AUTO: 0 % (ref 0.3–6.2)
ERYTHROCYTE [DISTWIDTH] IN BLOOD BY AUTOMATED COUNT: 19.9 % (ref 12.3–15.4)
GLOBULIN UR ELPH-MCNC: 2.6 GM/DL
GLUCOSE SERPL-MCNC: 93 MG/DL (ref 65–99)
HCT VFR BLD AUTO: 33.6 % (ref 34–46.6)
HGB BLD-MCNC: 10.7 G/DL (ref 12–15.9)
LYMPHOCYTES # BLD AUTO: 0.42 10*3/MM3 (ref 0.7–3.1)
LYMPHOCYTES NFR BLD AUTO: 10.2 % (ref 19.6–45.3)
MCH RBC QN AUTO: 30.2 PG (ref 26.6–33)
MCHC RBC AUTO-ENTMCNC: 31.8 G/DL (ref 31.5–35.7)
MCV RBC AUTO: 94.9 FL (ref 79–97)
MONOCYTES # BLD AUTO: 0.63 10*3/MM3 (ref 0.1–0.9)
MONOCYTES NFR BLD AUTO: 15.3 % (ref 5–12)
NEUTROPHILS NFR BLD AUTO: 2.9 10*3/MM3 (ref 1.7–7)
NEUTROPHILS NFR BLD AUTO: 70.1 % (ref 42.7–76)
PLATELET # BLD AUTO: 179 10*3/MM3 (ref 140–450)
PMV BLD AUTO: 9.6 FL (ref 6–12)
POTASSIUM SERPL-SCNC: 4.5 MMOL/L (ref 3.5–5.2)
PROT SERPL-MCNC: 6.7 G/DL (ref 6–8.5)
RBC # BLD AUTO: 3.54 10*6/MM3 (ref 3.77–5.28)
SODIUM SERPL-SCNC: 140 MMOL/L (ref 136–145)
WBC NRBC COR # BLD: 4.13 10*3/MM3 (ref 3.4–10.8)

## 2022-12-01 PROCEDURE — 25010000002 DIPHENHYDRAMINE PER 50 MG: Performed by: INTERNAL MEDICINE

## 2022-12-01 PROCEDURE — 25010000002 PACLITAXEL PROTEIN-BOUND PART PER 1 MG: Performed by: INTERNAL MEDICINE

## 2022-12-01 PROCEDURE — 96413 CHEMO IV INFUSION 1 HR: CPT

## 2022-12-01 PROCEDURE — 80053 COMPREHEN METABOLIC PANEL: CPT

## 2022-12-01 PROCEDURE — G0463 HOSPITAL OUTPT CLINIC VISIT: HCPCS | Performed by: RADIOLOGY

## 2022-12-01 PROCEDURE — 36415 COLL VENOUS BLD VENIPUNCTURE: CPT

## 2022-12-01 PROCEDURE — 25010000002 HEPARIN LOCK FLUSH PER 10 UNITS: Performed by: OBSTETRICS & GYNECOLOGY

## 2022-12-01 PROCEDURE — 25010000002 FOSAPREPITANT PER 1 MG: Performed by: INTERNAL MEDICINE

## 2022-12-01 PROCEDURE — 25010000002 PALONOSETRON PER 25 MCG: Performed by: INTERNAL MEDICINE

## 2022-12-01 PROCEDURE — 25010000002 DEXAMETHASONE SODIUM PHOSPHATE 100 MG/10ML SOLUTION: Performed by: INTERNAL MEDICINE

## 2022-12-01 PROCEDURE — 85025 COMPLETE CBC W/AUTO DIFF WBC: CPT

## 2022-12-01 PROCEDURE — 96367 TX/PROPH/DG ADDL SEQ IV INF: CPT

## 2022-12-01 PROCEDURE — 96366 THER/PROPH/DIAG IV INF ADDON: CPT

## 2022-12-01 PROCEDURE — 96375 TX/PRO/DX INJ NEW DRUG ADDON: CPT

## 2022-12-01 RX ORDER — DIPHENHYDRAMINE HYDROCHLORIDE 50 MG/ML
50 INJECTION INTRAMUSCULAR; INTRAVENOUS AS NEEDED
Status: DISCONTINUED | OUTPATIENT
Start: 2022-12-01 | End: 2022-12-01 | Stop reason: HOSPADM

## 2022-12-01 RX ORDER — SODIUM CHLORIDE 0.9 % (FLUSH) 0.9 %
10 SYRINGE (ML) INJECTION AS NEEDED
Status: DISCONTINUED | OUTPATIENT
Start: 2022-12-01 | End: 2022-12-01 | Stop reason: HOSPADM

## 2022-12-01 RX ORDER — PALONOSETRON 0.05 MG/ML
0.25 INJECTION, SOLUTION INTRAVENOUS ONCE
Status: COMPLETED | OUTPATIENT
Start: 2022-12-01 | End: 2022-12-01

## 2022-12-01 RX ORDER — HEPARIN SODIUM (PORCINE) LOCK FLUSH IV SOLN 100 UNIT/ML 100 UNIT/ML
500 SOLUTION INTRAVENOUS AS NEEDED
Status: DISCONTINUED | OUTPATIENT
Start: 2022-12-01 | End: 2022-12-01 | Stop reason: HOSPADM

## 2022-12-01 RX ORDER — SODIUM CHLORIDE 0.9 % (FLUSH) 0.9 %
10 SYRINGE (ML) INJECTION AS NEEDED
Status: CANCELLED | OUTPATIENT
Start: 2022-12-01

## 2022-12-01 RX ORDER — FAMOTIDINE 40 MG/1
TABLET, FILM COATED ORAL
Qty: 30 TABLET | Refills: 2 | Status: SHIPPED | OUTPATIENT
Start: 2022-12-01 | End: 2023-02-07

## 2022-12-01 RX ORDER — FAMOTIDINE 10 MG/ML
20 INJECTION, SOLUTION INTRAVENOUS AS NEEDED
Status: DISCONTINUED | OUTPATIENT
Start: 2022-12-01 | End: 2022-12-01 | Stop reason: HOSPADM

## 2022-12-01 RX ORDER — SODIUM CHLORIDE 9 MG/ML
250 INJECTION, SOLUTION INTRAVENOUS ONCE
Status: COMPLETED | OUTPATIENT
Start: 2022-12-01 | End: 2022-12-01

## 2022-12-01 RX ORDER — FAMOTIDINE 10 MG/ML
20 INJECTION, SOLUTION INTRAVENOUS ONCE
Status: COMPLETED | OUTPATIENT
Start: 2022-12-01 | End: 2022-12-01

## 2022-12-01 RX ORDER — PACLITAXEL 100 MG/20ML
200 INJECTION, POWDER, LYOPHILIZED, FOR SUSPENSION INTRAVENOUS ONCE
Status: COMPLETED | OUTPATIENT
Start: 2022-12-01 | End: 2022-12-01

## 2022-12-01 RX ORDER — HEPARIN SODIUM (PORCINE) LOCK FLUSH IV SOLN 100 UNIT/ML 100 UNIT/ML
500 SOLUTION INTRAVENOUS AS NEEDED
Status: CANCELLED | OUTPATIENT
Start: 2022-12-01

## 2022-12-01 RX ADMIN — FOSAPREPITANT 150 MG: 150 INJECTION, POWDER, LYOPHILIZED, FOR SOLUTION INTRAVENOUS at 12:04

## 2022-12-01 RX ADMIN — HEPARIN SODIUM (PORCINE) LOCK FLUSH IV SOLN 100 UNIT/ML 500 UNITS: 100 SOLUTION at 13:23

## 2022-12-01 RX ADMIN — FAMOTIDINE 20 MG: 10 INJECTION INTRAVENOUS at 11:19

## 2022-12-01 RX ADMIN — Medication 10 ML: at 13:23

## 2022-12-01 RX ADMIN — DEXAMETHASONE SODIUM PHOSPHATE 12 MG: 10 INJECTION, SOLUTION INTRAMUSCULAR; INTRAVENOUS at 11:45

## 2022-12-01 RX ADMIN — SODIUM CHLORIDE 250 ML: 9 INJECTION, SOLUTION INTRAVENOUS at 11:17

## 2022-12-01 RX ADMIN — DIPHENHYDRAMINE HYDROCHLORIDE 50 MG: 50 INJECTION, SOLUTION INTRAMUSCULAR; INTRAVENOUS at 11:24

## 2022-12-01 RX ADMIN — PALONOSETRON HYDROCHLORIDE 0.25 MG: 0.25 INJECTION INTRAVENOUS at 11:18

## 2022-12-01 RX ADMIN — PACLITAXEL 200 MG: 100 INJECTION, POWDER, LYOPHILIZED, FOR SUSPENSION INTRAVENOUS at 12:37

## 2022-12-05 ENCOUNTER — OFFICE VISIT (OUTPATIENT)
Dept: OBSTETRICS AND GYNECOLOGY | Facility: CLINIC | Age: 62
End: 2022-12-05

## 2022-12-05 VITALS
HEIGHT: 65 IN | DIASTOLIC BLOOD PRESSURE: 88 MMHG | BODY MASS INDEX: 35.49 KG/M2 | SYSTOLIC BLOOD PRESSURE: 128 MMHG | WEIGHT: 213 LBS

## 2022-12-05 DIAGNOSIS — Z12.31 ENCOUNTER FOR SCREENING MAMMOGRAM FOR MALIGNANT NEOPLASM OF BREAST: ICD-10-CM

## 2022-12-05 DIAGNOSIS — Z12.4 ENCOUNTER FOR SCREENING FOR CERVICAL CANCER: ICD-10-CM

## 2022-12-05 DIAGNOSIS — Z01.419 WELL WOMAN EXAM WITH ROUTINE GYNECOLOGICAL EXAM: Primary | ICD-10-CM

## 2022-12-05 PROCEDURE — G0123 SCREEN CERV/VAG THIN LAYER: HCPCS | Performed by: NURSE PRACTITIONER

## 2022-12-05 PROCEDURE — 99396 PREV VISIT EST AGE 40-64: CPT | Performed by: NURSE PRACTITIONER

## 2022-12-05 PROCEDURE — 87624 HPV HI-RISK TYP POOLED RSLT: CPT | Performed by: NURSE PRACTITIONER

## 2022-12-05 NOTE — PROGRESS NOTES
Subjective   Rachel Sierra is a 62 y.o. female  YOB: 1960        Chief Complaint   Patient presents with   • Gynecologic Exam     Pt here for annual, last mammogram 3/1/22, last colonoscopy 2017, pt states that she is retaining a lot of water and wanted Maday's thoughts on what to do.        Gynecologic Exam  The patient's pertinent negatives include no pelvic pain. Pertinent negatives include no abdominal pain, back pain, constipation, diarrhea, dysuria, fever, frequency, hematuria, nausea, rash, sore throat, urgency or vomiting.       The following portions of the patient's history were reviewed and updated as appropriate: allergies, current medications, past family history, past medical history, past social history, past surgical history, and problem list.    Allergies   Allergen Reactions   • Honey Anaphylaxis     Anaphylaxis as a child - edema at lips as adult.   • Paclitaxel Unknown - High Severity     Chest pain   • Sulfa Antibiotics Rash and Swelling     Edema throat, hands and face -  Hospitalized for 2 days.   • Bupropion Hives   • Nuts Other (See Comments)     Blood in stools       Past Medical History:   Diagnosis Date   • Abnormal Pap smear of cervix 3-2022    Had uterine cancer   • Depression 2000    Took med for a year   • Endometrial carcinoma (HCC)    • Fibroid 2015    Endometrial biopsy   • PONV (postoperative nausea and vomiting)    • Post-menopausal bleeding    • Seasonal allergies    • Urinary tract infection Periodically throughout my life   • Varicella Childhood       Family History   Problem Relation Age of Onset   • Lung cancer Mother    • Osteoporosis Mother    • Anemia Father    • Breast cancer Father    • Melanoma Sister    • Melanoma Sister         Skin , surgery done   • Melanoma Sister    • Ovarian cancer Neg Hx    • Colon cancer Neg Hx    • Malig Hyperthermia Neg Hx        Social History     Socioeconomic History   • Marital status:    Tobacco Use   •  Smoking status: Never   • Smokeless tobacco: Never   Vaping Use   • Vaping Use: Never used   Substance and Sexual Activity   • Alcohol use: Yes     Alcohol/week: 1.0 - 2.0 standard drink     Types: 1 - 2 Cans of beer per week     Comment: occ   • Drug use: Never   • Sexual activity: Yes     Partners: Male     Birth control/protection: Post-menopausal, None     Comment:  had vasectomy in 20’s I didn’t use contraception         Current Outpatient Medications:   •  Cyanocobalamin (VITAMIN B-12 SL), Place  under the tongue., Disp: , Rfl:   •  diphenhydrAMINE (BENADRYL) 25 mg capsule, Take 25 mg by mouth Every 6 (Six) Hours As Needed for Itching or Allergies., Disp: , Rfl:   •  IBUPROFEN PO, Take  by mouth As Needed., Disp: , Rfl:   •  lidocaine-prilocaine (EMLA) 2.5-2.5 % cream, 30 minutes prior to access apply generous amount to port site area and cover with clear plastic wrap until ready to access, Disp: 1 each, Rfl: 2  •  dexamethasone (DECADRON) 4 MG tablet, Start premedication 2 days prior to planned treatment, take one tablet by mouth twice daily x 2 days before txt, Disp: 40 tablet, Rfl: 0  •  diphenoxylate-atropine (LOMOTIL) 2.5-0.025 MG per tablet, Take 2 tablets po with first loose bowel movement and then 1 tablet po with each loose stool with max of 6 tablets per day, Disp: 60 tablet, Rfl: 1  •  famotidine (PEPCID) 40 MG tablet, Take one tablet by mouth once daily, Disp: 30 tablet, Rfl: 2  •  ondansetron (ZOFRAN) 8 MG tablet, Take 1 tablet by mouth Every 8 (Eight) Hours As Needed for Nausea or Vomiting., Disp: 30 tablet, Rfl: 0    No LMP recorded. Patient has had a hysterectomy.    Sexual History:           Could not be calculated    Past Surgical History:   Procedure Laterality Date   • BREAST BIOPSY Left     lipoma   • CHOLECYSTECTOMY     • ENDOMETRIAL BIOPSY  2015   • LAPAROSCOPIC ASSISTED VAGINAL HYSTERECTOMY SALPINGO OOPHORECTOMY  4-2022    Total robotic laparoscopic hysterectomy   • LIPOMA  EXCISION      MULTIPLE   • TONSILLECTOMY     • TOTAL LAPAROSCOPIC HYSTERECTOMY N/A 04/07/2022    Procedure: Robotic assisted total laparoscopic hysterectomy, bilateral salpingoophorecotmy, sentinel lymph node mapping.;  Surgeon: Adela Jay DO;  Location: [facility] OR;  Service: Robotics - DaVinci;  Laterality: N/A;   • VENOUS ACCESS DEVICE (PORT) INSERTION N/A 05/09/2022    Procedure: SINGLE LUMEN PORT PLACEMENT;  Surgeon: Flora De La Cruz MD;  Location: [facility] OR;  Service: General;  Laterality: N/A;       Review of Systems   Constitutional: Negative for activity change, appetite change, fatigue, fever, unexpected weight gain and unexpected weight loss.   HENT: Negative for congestion, ear pain, hearing loss, nosebleeds, rhinorrhea, sore throat, tinnitus and trouble swallowing.    Eyes: Negative for blurred vision, pain, discharge, itching and visual disturbance.   Respiratory: Negative for apnea, chest tightness, shortness of breath and wheezing.    Cardiovascular: Negative for chest pain and leg swelling.   Gastrointestinal: Negative for abdominal pain, blood in stool, constipation, diarrhea, nausea, vomiting and GERD.   Endocrine: Negative for heat intolerance, polydipsia and polyuria.   Genitourinary: Negative.  Negative for breast lump, decreased libido, difficulty urinating, dyspareunia, dysuria, frequency, genital sores, hematuria, menstrual problem, pelvic pain, urgency, urinary incontinence and vaginal pain.   Musculoskeletal: Negative for arthralgias, back pain, joint swelling and myalgias.   Skin: Negative for color change, rash and skin lesions.   Allergic/Immunologic: Negative for environmental allergies, food allergies and immunocompromised state.   Neurological: Negative for dizziness, tremors, seizures, syncope, facial asymmetry, numbness and headache.   Hematological: Negative for adenopathy. Does not bruise/bleed easily.   Psychiatric/Behavioral: Negative for agitation, hallucinations,  sleep disturbance, suicidal ideas and depressed mood. The patient is not nervous/anxious.        Objective   Physical Exam  Vitals and nursing note reviewed.   Constitutional:       Appearance: She is well-developed.   HENT:      Head: Normocephalic and atraumatic.      Right Ear: External ear normal.      Left Ear: External ear normal.   Eyes:      General: No scleral icterus.        Right eye: No discharge.         Left eye: No discharge.      Conjunctiva/sclera: Conjunctivae normal.   Neck:      Thyroid: No thyromegaly.      Vascular: No carotid bruit.   Cardiovascular:      Rate and Rhythm: Regular rhythm.      Heart sounds: Normal heart sounds. No murmur heard.  Pulmonary:      Effort: Pulmonary effort is normal. No respiratory distress.      Breath sounds: Normal breath sounds.   Chest:   Breasts:     Breasts are symmetrical.      Right: No inverted nipple, mass, nipple discharge, skin change or tenderness.      Left: No inverted nipple, mass, nipple discharge, skin change or tenderness.   Abdominal:      General: Bowel sounds are normal. There is no distension.      Palpations: Abdomen is soft. There is no mass.      Tenderness: There is no abdominal tenderness. There is no guarding.      Hernia: No hernia is present. There is no hernia in the left inguinal area.   Genitourinary:     Labia:         Right: No rash, tenderness, lesion or injury.         Left: No rash, tenderness, lesion or injury.       Vagina: Normal. No signs of injury. No vaginal discharge, erythema or bleeding.      Rectum: No mass.      Comments: Cervix, Uterus and Adnexa are surgically absent.  Urethra and urethral meatus normal  Bladder, normal no prolapse  Perineum and anus examined and without lesions  Musculoskeletal:         General: No tenderness. Normal range of motion.      Cervical back: Normal range of motion and neck supple.   Lymphadenopathy:      Head:      Right side of head: No submental, submandibular, tonsillar,  "preauricular, posterior auricular or occipital adenopathy.      Left side of head: No submental, submandibular, tonsillar, preauricular, posterior auricular or occipital adenopathy.      Cervical: No cervical adenopathy.      Right cervical: No superficial, deep or posterior cervical adenopathy.     Left cervical: No superficial, deep or posterior cervical adenopathy.   Skin:     General: Skin is warm and dry.      Findings: No bruising, erythema or rash.   Neurological:      Mental Status: She is alert and oriented to person, place, and time.      Motor: No abnormal muscle tone.      Coordination: Coordination normal.   Psychiatric:         Behavior: Behavior normal.         Thought Content: Thought content normal.         Judgment: Judgment normal.           Vitals:    12/05/22 1348   BP: 128/88   BP Location: Right arm   Patient Position: Sitting   Cuff Size: Large Adult   Weight: 96.6 kg (213 lb)   Height: 163.8 cm (64.5\")       Diagnoses and all orders for this visit:    1. Well woman exam with routine gynecological exam (Primary)        Normal GYN exam. Will have lab work here. Encouraged SBE.  Pt is aware how to do self breast exam and the importance of same. Discussed weight management and importance of maintaining a healthy weight. Discussed Vitamin D intake and the importance of adequate vitamin D for both bone health and a healthy immune system.  Discussed daily exercise and the importance of same in regards to a healthy heart as well as helping to maintain her weight and improving her mental health.  Body mass index is 36 kg/m². Colonoscopy is up to date.  Mammogram will be scheduled at [facility]. Pap smear is done per ASCCP guidelines.    Class 2 Severe Obesity (BMI >=35 and <=39.9). Obesity-related health conditions include the following: GERD. Obesity is unchanged. BMI is is above average; BMI management plan is completed. We discussed portion control and increasing exercise.           "   Non-Smoker    MyChart Instructions Given

## 2022-12-07 LAB
GEN CATEG CVX/VAG CYTO-IMP: ABNORMAL
HPV I/H RISK 4 DNA CVX QL PROBE+SIG AMP: NOT DETECTED
LAB AP CASE REPORT: ABNORMAL
LAB AP GYN ADDITIONAL INFORMATION: ABNORMAL
LAB AP GYN OTHER FINDINGS: ABNORMAL
Lab: ABNORMAL
PATH INTERP SPEC-IMP: ABNORMAL
STAT OF ADQ CVX/VAG CYTO-IMP: ABNORMAL

## 2022-12-21 ENCOUNTER — LAB (OUTPATIENT)
Dept: LAB | Facility: HOSPITAL | Age: 62
End: 2022-12-21

## 2022-12-21 DIAGNOSIS — C54.1 ENDOMETRIAL CARCINOMA: ICD-10-CM

## 2022-12-21 LAB
ALBUMIN SERPL-MCNC: 3.7 G/DL (ref 3.5–5.2)
ALBUMIN/GLOB SERPL: 1.5 G/DL
ALP SERPL-CCNC: 80 U/L (ref 39–117)
ALT SERPL W P-5'-P-CCNC: 18 U/L (ref 1–33)
ANION GAP SERPL CALCULATED.3IONS-SCNC: 7 MMOL/L (ref 5–15)
AST SERPL-CCNC: 20 U/L (ref 1–32)
BASOPHILS # BLD AUTO: 0.02 10*3/MM3 (ref 0–0.2)
BASOPHILS NFR BLD AUTO: 0.5 % (ref 0–1.5)
BILIRUB SERPL-MCNC: 0.2 MG/DL (ref 0–1.2)
BUN SERPL-MCNC: 12 MG/DL (ref 8–23)
BUN/CREAT SERPL: 17.4 (ref 7–25)
CALCIUM SPEC-SCNC: 9.4 MG/DL (ref 8.6–10.5)
CANCER AG125 SERPL QL: 11.2 U/ML (ref 0–38.1)
CHLORIDE SERPL-SCNC: 104 MMOL/L (ref 98–107)
CO2 SERPL-SCNC: 28 MMOL/L (ref 22–29)
CREAT SERPL-MCNC: 0.69 MG/DL (ref 0.57–1)
DEPRECATED RDW RBC AUTO: 62.7 FL (ref 37–54)
EGFRCR SERPLBLD CKD-EPI 2021: 97.1 ML/MIN/1.73
EOSINOPHIL # BLD AUTO: 0.04 10*3/MM3 (ref 0–0.4)
EOSINOPHIL NFR BLD AUTO: 1 % (ref 0.3–6.2)
ERYTHROCYTE [DISTWIDTH] IN BLOOD BY AUTOMATED COUNT: 17.7 % (ref 12.3–15.4)
GLOBULIN UR ELPH-MCNC: 2.5 GM/DL
GLUCOSE SERPL-MCNC: 102 MG/DL (ref 65–99)
HCT VFR BLD AUTO: 35.2 % (ref 34–46.6)
HGB BLD-MCNC: 11.3 G/DL (ref 12–15.9)
IMM GRANULOCYTES # BLD AUTO: 0.06 10*3/MM3 (ref 0–0.05)
IMM GRANULOCYTES NFR BLD AUTO: 1.5 % (ref 0–0.5)
LYMPHOCYTES # BLD AUTO: 0.45 10*3/MM3 (ref 0.7–3.1)
LYMPHOCYTES NFR BLD AUTO: 11.6 % (ref 19.6–45.3)
MCH RBC QN AUTO: 31.1 PG (ref 26.6–33)
MCHC RBC AUTO-ENTMCNC: 32.1 G/DL (ref 31.5–35.7)
MCV RBC AUTO: 97 FL (ref 79–97)
MONOCYTES # BLD AUTO: 0.35 10*3/MM3 (ref 0.1–0.9)
MONOCYTES NFR BLD AUTO: 9 % (ref 5–12)
NEUTROPHILS NFR BLD AUTO: 2.97 10*3/MM3 (ref 1.7–7)
NEUTROPHILS NFR BLD AUTO: 76.4 % (ref 42.7–76)
NRBC BLD AUTO-RTO: 0.5 /100 WBC (ref 0–0.2)
PLATELET # BLD AUTO: 273 10*3/MM3 (ref 140–450)
PMV BLD AUTO: 9.1 FL (ref 6–12)
POTASSIUM SERPL-SCNC: 3.8 MMOL/L (ref 3.5–5.2)
PROT SERPL-MCNC: 6.2 G/DL (ref 6–8.5)
RBC # BLD AUTO: 3.63 10*6/MM3 (ref 3.77–5.28)
SODIUM SERPL-SCNC: 139 MMOL/L (ref 136–145)
WBC NRBC COR # BLD: 3.89 10*3/MM3 (ref 3.4–10.8)

## 2022-12-21 PROCEDURE — 86304 IMMUNOASSAY TUMOR CA 125: CPT

## 2022-12-21 PROCEDURE — 80053 COMPREHEN METABOLIC PANEL: CPT

## 2022-12-21 PROCEDURE — 85025 COMPLETE CBC W/AUTO DIFF WBC: CPT

## 2022-12-21 PROCEDURE — 36415 COLL VENOUS BLD VENIPUNCTURE: CPT

## 2022-12-23 NOTE — PROGRESS NOTES
"[facility] HEMATOLOGY & ONCOLOGY  [address]  [facility] [place] [address] [phone]    Patient Name: Rachel Sierra  Encounter Date: 12/28/2022  YOB: 1960  Patient Number: [id number]       REASON FOR VISIT: Rachel Sierra is a 61-year-old female who returns in follow-up of stage FIGO IA endometrial carcinoma.  She underwent robotic assisted total laparoscopic hysterectomy, bilateral salpingo-oophorectomy, sentinel lymph node mapping, 04/07/2022.  She was receiving adjuvant carboplatin+ paclitaxel, cycle 3 on 06/30/2022 and radiation in \"sandwich\" fashion beginning 07/19/2022-08/30/2022 (28/28 fx). C4 paclitaxel infusion stopped due to chest pain.  Resumed C4D1, 09/15/2022 with alternative Abraxane+carboplatin.  Completed C6D15, 12/01/2022.  She is here alone (previously with her spouse, Pablo).    I have reviewed the HPI and verified with the patient the accuracy of it. No changes to interval history since the information was documented. Js Lima MD 12/28/22     Diagnostic abnormalities:  --02/24/2022- transvaginal ultrasound--uterus 10 cm length, 3.9 cm fundal fibroid.  Endometrial thickness 27 mm, ovaries normal.  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  --03/07/2022- BRCA 1/2: Negative-no clinically significant variants identified  --03/17/2022-mammogram-persistent nodular densities within the right retroareolar and left medial breast corresponding with benign cysts versus ductal ectasia in the medial left breast.  Finding consistent with benign process.  Return to annual screening mammography recommended, March 2023.  BI-RADS 2 benign exam.  --04/06/2022- BMP normal.  CBC normal.  --04/27/2022 --consult by Dr. Miller.  Recommends treatment to the pelvis with curative radiation therapy, anticipate 4500 cGy over 25-28 fractions, " "final course pending completion of planning.  In addition recommend 3 HDR brachytherapy fractions, final course pending.  --05/05/2022-- CT chest-no acute abnormality no evidence of neoplastic disease within the chest.  -- 05/05/2022-CT abdomen/pelvis--3-4 cm soft tissue \"mass\" at the left pelvic sidewall for which neoplastic lymphadenopathy is favored given the history of endometrial carcinoma. Mildly dilated right ureter extending into the upper pelvis where there appears to be a focus of scarring. There is no high-grade ureteral obstruction. Addendum: This examination is reviewed with and discussed with Dr. Miller. The previously described 3-4 cm soft tissue mass along the left pelvic  sidewall is seen to have fluid density. The low-density and discrete margins support the idea that this represents a lymphocele rather than neoplastic lymphadenopathy. Ultrasound evaluation is planned to confirm this. If ultrasound is not helpful pre and postcontrast pelvic MRI should be  Considered.  -- 05/17/2022- ultrasound pelvis-Reidentified LEFT iliac chain collection/lesion measuring 3.0 x 3.7 x 2.7 cm. There are some low-level internal echoes within this lesion. No definite internal vascularity. Favor this to represent a postoperative lymphocele, especially given recent niecy dissection in this region. However this area is incompletely characterized by ultrasound and continued follow-up is recommended to confirm stability or resolution. Alternatively, MRI pelvis would provide better characterization.    Previous interventions:  --04/07/2022- robotic assisted total laparoscopic hysterectomy, bilateral salpingo-oophorectomy, sentinel lymph node mapping by Dr. Jay.  --Final diagnosis:1. Uterus, Cervix, Bilateral Fallopian Tubes and Ovaries, Hysterectomy with Bilateral Salpingo-oophorectomy         (236 grams): High-grade endometrial carcinoma.               A. Endometrium:                           1. High-grade serous " "carcinoma.                          2. The tumor measures 4.2 x 3.5 x 1.5 cm.                          3. The tumor extends into the myometrium a total of 6.25 mm out of a total thickness of 13 mm       (48%)  4. No definitive capillary lymphatic invasion is identified.   5. No definitive perineural invasion is seen.  6. Adenomyosis is identified with no definitive tumor involving the adenomyosis.  7. Parametrium not involved.              8. The tumor extends into the lower uterine segment but does not invade the stroma.  B. Benign cervix with               1. Nabothian cysts.  C. Benign parametria.  D. Benign right fallopian tube.  E. Benign right ovary.  F. Benign left fallopian tube.  G. Benign ovary.              H. Benign serosa.   2. Lymph Nodes, Right Obturator [place] Lymph Node, Excision:                A. Three benign lymph nodes.   3. Lymph Nodes, Left Obturator [place] Lymph Node, Excision:               A. Three benign lymph nodes.     -- Adjuvant carboplatin+ paclitaxel:  C1, 05/19/2022; C2, 06/09/2022; C3, 06/30/2022; C4, 09/06/2022-infusion stopped due to chest pain  -- \"sandwich\" treatment to the pelvis with curative radiation therapy, beginning 07/19/2022 -08/30/3033 (4500 cGy in 28 fractions).  In addition recommend 3 HDR brachytherapy fractions, final course pending.  --Adjuvant carboplatin +Abraxane:  C4D1, 09/15/2022; C4D8, 09/22/2022; C4D15, 10/06/2022; C5D1, 10/13/2022; C5D8, 10/20/2022; C5D15, 10/27/2022      LABS    Lab Results - Last 18 Months   Lab Units 12/21/22  0953 12/01/22  0954 11/17/22  0811 11/10/22  0814 11/02/22  0931 10/27/22  0823 10/20/22  1037 10/13/22  1035 10/06/22  1030 08/10/22  0754 07/07/22  0827 06/23/22  1418 06/16/22  1423 06/02/22  1424 05/26/22  1407   HEMOGLOBIN g/dL 11.3* 10.7* 11.5* 12.6 12.4 11.0* 12.2 12.6 13.1   < > 12.2   < > 13.1   < > 12.7   HEMATOCRIT % 35.2 33.6* 35.6 37.0 36.4 33.0* 35.7 38.0 39.3   < > 37.4   < > 40.5   < > 39.3   MCV fL 97.0 94.9 " 93.0 90.9 90.8 90.2 89.7 89.8 89.9   < > 89.0   < > 88.0   < > 86.2   WBC 10*3/mm3 3.89 4.13 2.33* 5.81 3.94 1.72* 2.75* 6.73 3.87   < > 6.13   < > 13.26*   < > 12.78*   RDW % 17.7* 19.9* 16.4* 16.7* 16.2* 13.8 13.4 13.2 13.1   < > 17.6*   < > 16.3*   < > 14.6   MPV fL 9.1 9.6 9.9 9.5 9.3 9.6 9.5 9.5 9.3   < > 10.4   < > 10.8   < > 10.6   PLATELETS 10*3/mm3 273 179 164 246 176 136* 182 234 189   < > 159   < > 206   < > 168   IMM GRAN % % 1.5*  --  1.3* 5.0* 1.5*  --  1.1*  --  2.6*   < >  --    < >  --    < >  --    NEUTROS ABS 10*3/mm3 2.97 2.90 1.77 4.51 3.36 1.22* 2.13 5.86 2.79   < > 4.02   < > 7.62*   < > 7.00   LYMPHS ABS 10*3/mm3 0.45* 0.42* 0.34* 0.54* 0.34*  --  0.36*  --  0.45*   < >  --    < >  --    < >  --    MONOS ABS 10*3/mm3 0.35 0.63 0.18 0.43 0.17  --  0.22  --  0.52   < >  --    < >  --    < >  --    EOS ABS 10*3/mm3 0.04 0.00 0.00 0.00 0.00  --  0.00  --  0.00   < > 0.12   < > 0.15   < > 0.41*   BASOS ABS 10*3/mm3 0.02 0.02 0.01 0.04 0.01  --  0.01  --  0.01   < > 0.06   < >  --    < >  --    IMMATURE GRANS (ABS) 10*3/mm3 0.06*  --  0.03 0.29* 0.06*  --  0.03  --  0.10*   < >  --    < >  --    < >  --    NRBC /100 WBC 0.5*  --  0.0 1.9* 0.5* 2.0* 0.0 1.0* 0.0   < >  --    < >  --    < >  --    NEUTROPHIL % %  --   --   --   --   --  69.0  --  84.0*  --   --  61.6  --  45.7  --  42.1*   MONOCYTES % %  --   --   --   --   --  10.0  --  5.0  --   --  11.1  --  16.0*  --  13.7*   BASOPHIL % %  --   --   --   --   --   --   --   --   --   --  1.0  --   --   --   --    ATYP LYMPH % %  --   --   --   --   --  2.0  --   --   --   --  5.1*  --  2.1  --  6.3*   ANISOCYTOSIS   --   --   --   --   --  Slight/1+  --  Slight/1+  --   --   --   --   --   --   --     < > = values in this interval not displayed.       Lab Results - Last 18 Months   Lab Units 12/21/22  0953 12/01/22  0954 11/17/22  0811 11/10/22  0814 11/02/22  0931 10/27/22  0823   GLUCOSE mg/dL 102* 93 115* 114* 129* 109*   SODIUM mmol/L 139 140  138 140 140 138   POTASSIUM mmol/L 3.8 4.5 4.4 4.5 4.0 3.9   CO2 mmol/L 28.0 27.0 30.0* 29.0 26.0 26.0   CHLORIDE mmol/L 104 103 98 101 103 104   ANION GAP mmol/L 7.0 10.0 10.0 10.0 11.0 8.0   CREATININE mg/dL 0.69 0.59 0.55* 0.72 0.55* 0.57   BUN mg/dL 12 19 17 19 18 16   BUN / CREAT RATIO  17.4 32.2* 30.9* 26.4* 32.7* 28.1*   CALCIUM mg/dL 9.4 9.5 9.9 9.7 9.7 9.1   ALK PHOS U/L 80 63 58 62 69 57   TOTAL PROTEIN g/dL 6.2 6.7 6.6 6.5 7.0 6.2   ALT (SGPT) U/L 18 21 24 24 22 19   AST (SGOT) U/L 20 20 17 17 16 14   BILIRUBIN mg/dL 0.2 0.3 0.4 0.3 0.2 0.2   ALBUMIN g/dL 3.70 4.10 4.30 4.30 4.30 3.80   GLOBULIN gm/dL 2.5 2.6 2.3 2.2 2.7 2.4         PAST MEDICAL HISTORY:  ALLERGIES:  Allergies   Allergen Reactions   • Honey Anaphylaxis     Anaphylaxis as a child - edema at lips as adult.   • Paclitaxel Unknown - High Severity     Chest pain   • Sulfa Antibiotics Rash and Swelling     Edema throat, hands and face -  Hospitalized for 2 days.   • Bupropion Hives   • Nuts Other (See Comments)     Blood in stools     CURRENT MEDICATIONS:  Outpatient Encounter Medications as of 12/28/2022   Medication Sig Dispense Refill   • Cyanocobalamin (VITAMIN B-12 SL) Place  under the tongue.     • dexamethasone (DECADRON) 4 MG tablet Start premedication 2 days prior to planned treatment, take one tablet by mouth twice daily x 2 days before txt 40 tablet 0   • diphenhydrAMINE (BENADRYL) 25 mg capsule Take 25 mg by mouth Every 6 (Six) Hours As Needed for Itching or Allergies.     • diphenoxylate-atropine (LOMOTIL) 2.5-0.025 MG per tablet Take 2 tablets po with first loose bowel movement and then 1 tablet po with each loose stool with max of 6 tablets per day 60 tablet 1   • famotidine (PEPCID) 40 MG tablet Take one tablet by mouth once daily 30 tablet 2   • IBUPROFEN PO Take  by mouth As Needed.     • lidocaine-prilocaine (EMLA) 2.5-2.5 % cream 30 minutes prior to access apply generous amount to port site area and cover with clear plastic  wrap until ready to access 1 each 2   • ondansetron (ZOFRAN) 8 MG tablet Take 1 tablet by mouth Every 8 (Eight) Hours As Needed for Nausea or Vomiting. 30 tablet 0     No facility-administered encounter medications on file as of 12/28/2022.     Adult illnesses:  Endometrial carcinoma  Seasonal allergies    Past surgeries:  Left breast biopsy-lipoma  Cholecystectomy  Endometrial biopsy, 2015  Multiple lipoma excisions  Tonsillectomy  Endometrial biopsy, 02/24/2022  Robotic assisted total laparoscopic hysterectomy/bilateral salpingo-oophorectomy/sentinel lymph node mapping, vaginal laceration repair, 04/17/2022-Dr. Jay  Left subclavian Mediport, single-lumen, 05/09/2022- Dr. De La Cruz      ADULT ILLNESSES:  Patient Active Problem List   Diagnosis Code   • Uterine cancer (HCC) C55   • BMI 36.0-36.9,adult Z68.36   • Endometrial carcinoma (HCC) C54.1   • Fitting and adjustment of vascular catheter Z45.2   • Non-smoker Z78.9   • S/P KIMMY-BSO (total abdominal hysterectomy and bilateral salpingo-oophorectomy) Z90.710, Z90.722, Z90.79   • Diarrhea R19.7   • Nausea R11.0   • Chemotherapy-induced neutropenia (HCC) D70.1, T45.1X5A   • History of radiation therapy Z92.3     SURGERIES:  Past Surgical History:   Procedure Laterality Date   • BREAST BIOPSY Left     lipoma   • CHOLECYSTECTOMY     • ENDOMETRIAL BIOPSY  2015   • LAPAROSCOPIC ASSISTED VAGINAL HYSTERECTOMY SALPINGO OOPHORECTOMY  4-2022    Total robotic laparoscopic hysterectomy   • LIPOMA EXCISION      MULTIPLE   • TONSILLECTOMY     • TOTAL LAPAROSCOPIC HYSTERECTOMY N/A 04/07/2022    Procedure: Robotic assisted total laparoscopic hysterectomy, bilateral salpingoophorecotmy, sentinel lymph node mapping.;  Surgeon: Adela Jay DO;  Location: [facility] OR;  Service: Robotics - [facility];  Laterality: N/A;   • VENOUS ACCESS DEVICE (PORT) INSERTION N/A 05/09/2022    Procedure: SINGLE LUMEN PORT PLACEMENT;  Surgeon: Flora De La Cruz MD;  Location: [facility] OR;   Service: General;  Laterality: N/A;     HEALTH MAINTENANCE ITEMS:  Health Maintenance Due   Topic Date Due   • COLORECTAL CANCER SCREENING  Never done   • Pneumococcal Vaccine 0-64 (1 - PCV) Never done   • ZOSTER VACCINE (1 of 2) Never done   • COVID-19 Vaccine (3 - Moderna risk series) 12/29/2021   • ANNUAL PHYSICAL  Never done   • INFLUENZA VACCINE  08/01/2022       <no information>  Last Completed Colonoscopy     This patient has no relevant Health Maintenance data.        Immunization History   Administered Date(s) Administered   • COVID-19 (MODERNA) 1st, 2nd, 3rd Dose Only 03/23/2021, 04/20/2021, 12/01/2021   • COVID-19 (MODERNA) BOOSTER 12/01/2021   • Flu Vaccine Split Quad 01/05/2009   • FluLaval/Fluzone >6mos 12/01/2021   • Influenza, Unspecified 12/01/2021   • TD Preservative Free 01/04/2008   • Tdap 08/16/2017     Last Completed Mammogram          Scheduled - MAMMOGRAM (Every 2 Years) Scheduled for 11/13/2023 03/17/2022  Mammo Diagnostic Digital Tomosynthesis Bilateral With CAD    03/01/2022  Mammo Screening Digital Tomosynthesis Bilateral With CAD    08/23/2017  Done                  FAMILY HISTORY:  Family History   Problem Relation Age of Onset   • Lung cancer Mother    • Osteoporosis Mother    • Anemia Father    • Breast cancer Father    • Melanoma Sister    • Melanoma Sister         Skin , surgery done   • Melanoma Sister    • Ovarian cancer Neg Hx    • Colon cancer Neg Hx    • Malig Hyperthermia Neg Hx      SOCIAL HISTORY:  Social History     Socioeconomic History   • Marital status:    Tobacco Use   • Smoking status: Never   • Smokeless tobacco: Never   Vaping Use   • Vaping Use: Never used   Substance and Sexual Activity   • Alcohol use: Yes     Alcohol/week: 1.0 - 2.0 standard drink     Types: 1 - 2 Cans of beer per week     Comment: occ   • Drug use: Never   • Sexual activity: Yes     Partners: Male     Birth control/protection: Post-menopausal, None     Comment:  had vasectomy  "in 20’s I didn’t use contraception       REVIEW OF SYSTEMS:  Review of Systems   Constitutional: Negative for activity change (\"More active\"), chills, diaphoresis, fatigue (\"better every week.\") and fever.        Manages her ADLs, to include some chores, errands and driving.  Is still up and about, \"most of the time.\"     Chemotherapy tolerance: No residual chest pain.  No mouth sores.  No nausea.  No vomiting.  No skin changes.  No neuropathy.  No loose stools and no diarrhea.     HENT: Positive for postnasal drip.    Eyes: Negative.    Respiratory: Negative.    Cardiovascular: Negative.    Gastrointestinal: Negative for diarrhea (None since completion of RT) and rectal pain (on days 4, 5 after chemo from hemorrhoids- readily relieved by decadron + benadryl.  \"All better.\").   Endocrine: Negative.  Negative for heat intolerance.   Genitourinary: Positive for urinary incontinence (Uses pads.  \"So much better with exercises.\"). Negative for dysuria, frequency, hematuria, pelvic pain and vaginal bleeding.        \"Vaginal spotting\" post-op resolved   Musculoskeletal: Negative.    Skin: Negative.    Allergic/Immunologic: Positive for environmental allergies (\"Hayfever\").        Allergic to sulfa   Neurological: Negative.    Hematological: Negative.    Psychiatric/Behavioral: Negative.        /76   Pulse 89   Temp 97.4 °F (36.3 °C) (Temporal)   Resp 18   Ht 163.8 cm (64.49\")   Wt 100 kg (221 lb 8 oz)   SpO2 96%   BMI 37.45 kg/m²  Body surface area is 2.05 meters squared.  Pain Score    12/28/22 0957   PainSc: 0-No pain       Physical Exam:  Physical Exam  Vitals reviewed.   Constitutional:       Appearance: She is obese.      Comments: Pleasant, cooperative, obese, modestly kept female.  Ambulatory.  ECOG 0.      She has gained 8 lb (in addition to 9 lb at her prior visit- had lost 9 lb at her visit prior to that) since her last visit    HENT:      Head: Normocephalic and atraumatic.      Mouth/Throat:      " Comments: Is wearing a surgical mask today  Eyes:      General: No scleral icterus.     Extraocular Movements: Extraocular movements intact.      Pupils: Pupils are equal, round, and reactive to light.   Cardiovascular:      Rate and Rhythm: Normal rate.   Pulmonary:      Effort: Pulmonary effort is normal.      Comments: Port in the left upper chest is well-seated  Abdominal:      Palpations: Abdomen is soft.      Tenderness: There is no abdominal tenderness.      Comments: Laparoscopy incisions are all well approximated and healed   Musculoskeletal:         General: Normal range of motion.      Cervical back: Normal range of motion.   Lymphadenopathy:      Cervical: No cervical adenopathy.   Skin:     General: Skin is warm.   Neurological:      General: No focal deficit present.      Mental Status: She is alert and oriented to person, place, and time.   Psychiatric:         Mood and Affect: Mood normal.         Behavior: Behavior normal.         Thought Content: Thought content normal.           Assessment:  1.    Serous carcinoma of the endometrium  · Stage: FIGO IA (pT1a, pN0(sn), pM0).    · Tumor [place]: The tumor measures 4.2 x 3.5 x 1.5 cm. Extends into the myometrium a total of 6.25 mm out of a total thickness of 13 mm (48%).  Extends into the lower uterine segment but does not invade the stroma. 6 regional lymph nodes negative for tumor cells   · Complications of Tumor: Postmenopausal bleeding  · Mismatch repair (MMR) proteins: Pending  · p16: strong (+)  · Pax8:  strong (+)  · Her2: 1+  · ER/[place]:(-)/(+) patchy  · Tumor Status:-- 04/07/2022- robotic assisted total laparoscopic hysterectomy, bilateral salpingo-oophorectomy, sentinel lymph node mapping by Dr. Jay.  · : 13.1, 06/02/2022 (prior: 37.1)  · Adjuvant carboplatin and Taxol in progress- Taxol stopped on C4, 09/06/2022 due to chest pain   · Adjuvant radiation (EBRT- completed 08/30/2022- 4500 cGy/28 fx).  HDR brachytherapy  "pending?  · Adjuvant Abraxane +carboplatin initiated, F7S2-66/15/2022- C6D15, 12/01/2022  · 12/5/2022- Gyn follow-up with DANIEL Junior- Gyn-Exam:   Cervix, Uterus and Adnexa are surgically absent. Urethra and urethral meatus normal.  Bladder, normal no prolapse.  Perineum and anus examined and without lesions.  · 12/5/2022-Pap smear-atypical glandular cells (AGC), NOS.  Other findings: Reactive cellular changes associated with radiation.  Atrophy with inflammation.  HPV Aptima: Not detected     2.    Thrombocytopenia.  Chemo effect  --273,000, 12/21/2022 (prior range: 76,000-494,000)  3.    Leukopenia with normal ANC.  Chemo associated.  Resolved  4.    Normocytic anemia.  Chemo associated.    -- Hgb 11.3; MCV 97, 12/21/2022 (prior: Hgb 10.7-12.6; MCV 90.2-94.9)  5.    Mild weight loss due to dysgeusia from chemo.  Resolved  6.    Taxol intolerance  7.    Hemorrhoids.  Worse after chemo.  On decadron for 2 days - days 4, and 5.  \"It really helps.\"     Plan:  1.  Apprised of labs, 12/21/2022 with glucose 129 otherwise normal CMP (resolution of elevated alk phos/ALT/AST), normal  11.2 (prior: 11.1-37.1), WBC 3.89/ANC 2.98, stable anemia otherwise normal CBC.  Chemo tolerance discussed.    No chest pain with Abraxane. Mild fatigue.  Taste changes.  No other overt toxicities.    2.  Office visit, 12/5/2022 with DANIEL Junior with Gyn-Exam:   Cervix, Uterus and Adnexa are surgically absent. Urethra and urethral meatus normal.  Bladder, normal no prolapse.  Perineum and anus examined and without lesions.  Normal GYN exam. Will have lab work here. Encouraged SBE.  Pt is aware how to do self breast exam and the importance of same. Discussed weight management and importance of maintaining a healthy weight. Discussed Vitamin D intake and the importance of adequate vitamin D for both bone health and a healthy immune system.  Discussed daily exercise and the importance of same in regards to a healthy heart as " well as helping to maintain her weight and improving her mental health.  Body mass index is 36 kg/m². Colonoscopy is up to date.  [facility]. Pap smear is done per ASCCP guidelines.  3.   Apprised of pap smear, 12/5/2022 (above):  atypical glandular cells (AGC), NOS.  Other findings: Reactive cellular changes associated with radiation.  Atrophy with inflammation.  HPV Aptima: Not detected    4.  Visit with Dr. Miller, 12/01/2022- follow up 1 year  5.  Previously apprised of CT, 05/02/2022 (above) and pelvic ultrasound, 05/17/2022 (above).  No metastatic disease to the chest.  Left iliac chain collection/lesion.  Favor postoperative lymphocele especially given recent niecy dissection.  Continued follow-up recommended vs MRI.  6.  Review NCCN guidelines version 1.2022 endometrial carcinoma-biopsy findings: Serous carcinoma following primary treatment with KIMMY/BSO, surgical staging and maximal tumor debulking.  For invasive stage IA-additional treatment: Systemic therapy (preferred regimens: Carboplatin/paclitaxel- primary adjuvant treatment with use for uterine confined high risk disease) +/-EBRT+/-vaginal brachytherapy-followed by surveillance: Physical exam every 3-6 months for 2-3 years, then every 6 months for up to 5 years then annually.  CA-125 if initially elevated.  Imaging as clinically indicated (abdominal/pelvic and/or chest CT recommended based upon symptoms or physical exam findings).     7.  Continue other currently identified medications.   8.  Schedule CT abdomen/pelvis with po/IV contrast this week  9.  Appoint to Dr. Hanks Re:  Primary care assumption  10.  Refer to GI Re:  Symptomatic hemorrhoids.  Past due for c-scope  11.  Schedule port flushes q 8 weeks  12.  Return to the office in 12 weeks with pre-office CBC with differential, CMP, .      I spent ~ 48 minutes caring for Rachel on this date of service. This time includes time spent by me in the following  activities: preparing for the visit, reviewing tests, performing a medically appropriate examination and/or evaluation, counseling and educating the patient/family/caregiver, ordering medications, tests, or procedures and documenting information in the medical record

## 2022-12-28 ENCOUNTER — OFFICE VISIT (OUTPATIENT)
Dept: ONCOLOGY | Facility: CLINIC | Age: 62
End: 2022-12-28

## 2022-12-28 VITALS
OXYGEN SATURATION: 96 % | DIASTOLIC BLOOD PRESSURE: 76 MMHG | BODY MASS INDEX: 37.81 KG/M2 | SYSTOLIC BLOOD PRESSURE: 124 MMHG | HEIGHT: 64 IN | TEMPERATURE: 97.4 F | WEIGHT: 221.5 LBS | RESPIRATION RATE: 18 BRPM | HEART RATE: 89 BPM

## 2022-12-28 DIAGNOSIS — C54.1 ENDOMETRIAL CARCINOMA: Primary | ICD-10-CM

## 2022-12-28 PROCEDURE — 99215 OFFICE O/P EST HI 40 MIN: CPT | Performed by: INTERNAL MEDICINE

## 2023-01-03 ENCOUNTER — HOSPITAL ENCOUNTER (OUTPATIENT)
Dept: CT IMAGING | Facility: HOSPITAL | Age: 63
Discharge: HOME | End: 2023-01-03
Admitting: INTERNAL MEDICINE
Payer: COMMERCIAL

## 2023-01-03 DIAGNOSIS — C54.1 ENDOMETRIAL CARCINOMA: ICD-10-CM

## 2023-01-03 PROCEDURE — 74177 CT ABD & PELVIS W/CONTRAST: CPT

## 2023-01-03 PROCEDURE — 25010000002 IOPAMIDOL 61 % SOLUTION: Performed by: INTERNAL MEDICINE

## 2023-01-03 RX ADMIN — IOPAMIDOL 100 ML: 612 INJECTION, SOLUTION INTRAVENOUS at 17:06

## 2023-01-12 ENCOUNTER — TELEPHONE (OUTPATIENT)
Dept: GYNECOLOGIC ONCOLOGY | Facility: CLINIC | Age: 63
End: 2023-01-12
Payer: COMMERCIAL

## 2023-01-12 DIAGNOSIS — C54.1 ENDOMETRIAL CARCINOMA: Primary | ICD-10-CM

## 2023-02-07 ENCOUNTER — OFFICE VISIT (OUTPATIENT)
Dept: INTERNAL MEDICINE | Facility: CLINIC | Age: 63
End: 2023-02-07
Payer: COMMERCIAL

## 2023-02-07 VITALS
HEIGHT: 64 IN | DIASTOLIC BLOOD PRESSURE: 78 MMHG | WEIGHT: 229 LBS | BODY MASS INDEX: 39.09 KG/M2 | OXYGEN SATURATION: 97 % | HEART RATE: 68 BPM | SYSTOLIC BLOOD PRESSURE: 122 MMHG

## 2023-02-07 DIAGNOSIS — R60.9 EDEMA, UNSPECIFIED TYPE: Primary | ICD-10-CM

## 2023-02-07 DIAGNOSIS — Z92.21 HISTORY OF CHEMOTHERAPY: ICD-10-CM

## 2023-02-07 DIAGNOSIS — Z92.3 HISTORY OF RADIATION THERAPY: ICD-10-CM

## 2023-02-07 DIAGNOSIS — C55 MALIGNANT NEOPLASM OF UTERUS, UNSPECIFIED SITE: ICD-10-CM

## 2023-02-07 PROBLEM — K63.5 COLON POLYPS: Status: ACTIVE | Noted: 2023-02-07

## 2023-02-07 PROCEDURE — 99214 OFFICE O/P EST MOD 30 MIN: CPT | Performed by: NURSE PRACTITIONER

## 2023-02-07 RX ORDER — FUROSEMIDE 20 MG/1
20 TABLET ORAL 2 TIMES DAILY
Qty: 30 TABLET | Refills: 0 | Status: SHIPPED | OUTPATIENT
Start: 2023-02-07 | End: 2023-02-27

## 2023-02-07 NOTE — PROGRESS NOTES
Chief Complaint   Patient presents with   • Establish Care   • Edema         History:  Rachel Sierra is a 62 y.o. female who presents today for evaluation of the above problems.          Here to establish with PCP. In general until a year ago, no history of significant medical problems.    Moved here September, 2021.  Started post-menopausal bleeding in spring, 2022. Biopsy revealed uterine cancer, had KIMMY/BSO. Follows with GYN every 3 months.  Seeing Dr. Lima and Dr. Miller, done now with chemo and radiation, as of December.     Swelling of both lower extremities since November.  No pain in her calves.  Hands swell a little too.  She admits to being more fatigued and short of breath than she was a year ago.  No chest pain.  She did have one episode of a reaction to chemo but does not know of any MI, etc. A CT abdomen showed trace pericardial effusion. She is wondering about trying some Lasix.      ROS:  Review of Systems  As above    Allergies   Allergen Reactions   • Honey Anaphylaxis     Anaphylaxis as a child - edema at lips as adult.   • Paclitaxel Unknown - High Severity     Chest pain   • Sulfa Antibiotics Rash and Swelling     Edema throat, hands and face -  Hospitalized for 2 days.   • Bupropion Hives   • Nuts Other (See Comments)     Blood in stools     Past Medical History:   Diagnosis Date   • Abnormal Pap smear of cervix 3-2022    Had uterine cancer   • Allergic Years ago    Sulfa, wellbutin , nuts, honey   • Arthritis 2005    Lower back   • Cholelithiasis 2-4-2013    Removed   • Colon polyp 10-3-2017    2 durning colonoscopy   • Depression 2000    Took med for a year   • Diverticulosis Ct scan 1/2023    Noted on recent CT scan   • Endometrial carcinoma (HCC)    • Fibroid 2015    Endometrial biopsy   • Low back pain 2005    Lower back   • Obesity 2000   • PONV (postoperative nausea and vomiting)    • Post-menopausal bleeding    • Seasonal allergies    • Urinary tract infection Periodically  throughout my life   • Varicella Childhood     Past Surgical History:   Procedure Laterality Date   • BREAST BIOPSY Left     lipoma   • CHOLECYSTECTOMY     • COLONOSCOPY  2017    2polops   • ENDOMETRIAL BIOPSY  2015   • LAPAROSCOPIC ASSISTED VAGINAL HYSTERECTOMY SALPINGO OOPHORECTOMY  4-2022    Total robotic laparoscopic hysterectomy   • LIPOMA EXCISION      MULTIPLE   • TONSILLECTOMY     • TOTAL LAPAROSCOPIC HYSTERECTOMY N/A 04/07/2022    Procedure: Robotic assisted total laparoscopic hysterectomy, bilateral salpingoophorecotmy, sentinel lymph node mapping.;  Surgeon: Adela Jay DO;  Location:  [facility];  Service: Robotics - DaVinci;  Laterality: N/A;   • VENOUS ACCESS DEVICE (PORT) INSERTION N/A 05/09/2022    Procedure: SINGLE LUMEN PORT PLACEMENT;  Surgeon: Flora De La Cruz MD;  Location: [facility] OR;  Service: General;  Laterality: N/A;     Family History   Problem Relation Age of Onset   • Lung cancer Mother    • Osteoporosis Mother    • Cancer Mother         Lung cancer   • Cancer Father    • Anemia Father    • Breast cancer Father    • Early death Father         Aplastic anemia   • Melanoma Sister    • Melanoma Sister         Skin , surgery done   • Depression Sister         Longer time,   • Melanoma Sister    • Alcohol abuse Maternal Uncle    • Cancer Maternal Grandfather    • Alcohol abuse Maternal Grandfather    • Ovarian cancer Neg Hx    • Colon cancer Neg Hx    • Malig Hyperthermia Neg Hx      Rachel  reports that she has never smoked. She has never used smokeless tobacco. She reports current alcohol use of about 1.0 - 2.0 standard drink per week. She reports that she does not use drugs.    I have reviewed and updated the above documentation (if necessary) including but not limited to chief complaint, ROS, PFSH, allergies and medications        Current Outpatient Medications:   •  furosemide (Lasix) 20 MG tablet, Take 1 tablet by mouth 2 (Two) Times a Day., Disp: 30 tablet, Rfl:  "0    OBJECTIVE:  Visit Vitals  /78 (BP Location: Right arm, Patient Position: Sitting, Cuff Size: Adult)   Pulse 68   Ht 162.6 cm (64\")   Wt 104 kg (229 lb)   SpO2 97%   BMI 39.31 kg/m²      Physical Exam  Vitals and nursing note reviewed.   Constitutional:       General: She is not in acute distress.     Appearance: Normal appearance. She is not ill-appearing, toxic-appearing or diaphoretic.   HENT:      Head: Normocephalic and atraumatic.   Eyes:      General: No scleral icterus.        Right eye: No discharge.         Left eye: No discharge.   Neck:      Vascular: No carotid bruit.      Comments: No thyromegaly or mass appreciated.    Cardiovascular:      Rate and Rhythm: Normal rate and regular rhythm.      Heart sounds: Normal heart sounds. No murmur heard.    No friction rub. No gallop.   Pulmonary:      Effort: Pulmonary effort is normal. No respiratory distress.      Breath sounds: Normal breath sounds. No stridor. No wheezing, rhonchi or rales.   Abdominal:      General: There is no distension.      Palpations: Abdomen is soft. There is no mass.      Tenderness: There is no abdominal tenderness.      Hernia: No hernia is present.   Musculoskeletal:         General: No tenderness (Negative Mikey's sign bilaterally).      Cervical back: Neck supple.      Right lower leg: Edema (1+ pitting edema below the knee) present.      Left lower leg: Edema (1+ pitting edema below the knee) present.   Lymphadenopathy:      Cervical: No cervical adenopathy.   Skin:     General: Skin is warm and dry.   Neurological:      Mental Status: She is alert and oriented to person, place, and time.   Psychiatric:         Mood and Affect: Mood normal.         Behavior: Behavior normal.         Thought Content: Thought content normal.         Judgment: Judgment normal.       Study Result    Narrative & Impression   EXAMINATION: CT ABDOMEN PELVIS W CONTRAST- 1/3/2023 5:14 PM CST     HISTORY: endometrial cancer; C54.1-Malignant " neoplasm of endometrium     DOSE: 554 mGycm (Automatic exposure control technique was implemented in  an effort to keep the radiation dose as low as possible without  compromising image quality)     REPORT: Spiral CT of the abdomen and pelvis was performed after  administration of intravenous contrast from the lung bases through the  pubic symphysis. Reconstructed coronal and sagittal images are also  reviewed.     COMPARISON: CT abdomen pelvis with contrast 05/05/2022.     Review of lung windows demonstrates normal aeration of the lung bases.  No pleural effusion is identified. There is trace pericardial fluid.  Cholecystectomy clips are present. The liver and spleen are homogeneous  without evidence of a mass. Oral contrast has been administered. The  stomach appears within normal limits. There is a small sliding-type  hiatal hernia. The pancreas and adrenal glands are unremarkable. No  biliary or pancreatic ductal dilation is identified. The kidneys enhance  normally and appear unremarkable. No hydronephrosis is identified. The  ureters are decompressed. The bladder is decompressed. No free fluid or  free air is identified. There is stable mild soft tissue fullness in the  left labial region. Given the lack of change this is probably benign.  Bowel loops are normal caliber and appear unremarkable, except for  scattered sigmoid diverticula. No intra-abdominal or pelvic  lymphadenopathy is identified. A cystic lesion seen in the left pelvic  sidewall previous CT is not identified. Review of bone windows shows no  evidence of osseous metastases. There is advanced degenerative disc  disease L3-4 and L4-5.     IMPRESSION:  1. No evidence of intra-abdominal or pelvic metastatic disease.  2. Interval resolution of a cystic mass in the left pelvic sidewall  region.  3. Prior cholecystectomy and hysterectomy.  4. Mild sigmoid diverticulosis without diverticulitis.  5. Mild soft tissue fullness with an oval shape in the  region of the  left labia/introitus, unchanged. Given the lack of change is suggested  benign process.     This report was finalized on 01/03/2023 17:20 by Dr. Cristian Medellin MD.       [facility]    Assessment/Plan    Diagnoses and all orders for this visit:    1. Edema, unspecified type (Primary)  -     furosemide (Lasix) 20 MG tablet; Take 1 tablet by mouth 2 (Two) Times a Day.  Dispense: 30 tablet; Refill: 0  -     Basic metabolic panel; Future  -     D-dimer, Quantitative; Future    2. Malignant neoplasm of uterus, unspecified site (HCC)    3. History of radiation therapy    4. History of chemotherapy      I reviewed her CMP and CBC from December.    New onset edema of lower extremities for 3 months.  Concern would be for cardiomyopathy or DVT related to cancer/treatments.  I discussed that I feel a thorough evaluation at this time would include US venous of both lower extremities to rule out DVT.  I would also like to order an echocardiogram.  She is not wanting to do these tests at this time due to expense related to her insurance.  I told her I will order these any time if she changes her mind.    Will initiate Lasix 20 mg daily and re-check BMP in 1-2 weeks. I also ordered a D-dimer.      Education materials and an After Visit Summary were printed and given to the patient at discharge.    Return in about 9 months (around 11/7/2023) for welcome to Medicare visit.She would like to postpone other testing or labs until she gets Medicare in the fall.         Kika Conklin, DANIEL   10:39 CST  2/7/2023

## 2023-02-27 ENCOUNTER — OFFICE VISIT (OUTPATIENT)
Dept: INTERNAL MEDICINE | Facility: CLINIC | Age: 63
End: 2023-02-27
Payer: COMMERCIAL

## 2023-02-27 VITALS
SYSTOLIC BLOOD PRESSURE: 126 MMHG | TEMPERATURE: 98.6 F | BODY MASS INDEX: 39.31 KG/M2 | HEIGHT: 64 IN | HEART RATE: 88 BPM | DIASTOLIC BLOOD PRESSURE: 82 MMHG | OXYGEN SATURATION: 97 %

## 2023-02-27 DIAGNOSIS — L03.818 CELLULITIS OF OTHER SPECIFIED SITE: Primary | ICD-10-CM

## 2023-02-27 PROCEDURE — 99213 OFFICE O/P EST LOW 20 MIN: CPT | Performed by: NURSE PRACTITIONER

## 2023-02-27 RX ORDER — CEPHALEXIN 500 MG/1
500 CAPSULE ORAL 2 TIMES DAILY
Qty: 14 CAPSULE | Refills: 0 | Status: SHIPPED | OUTPATIENT
Start: 2023-02-27 | End: 2023-03-06 | Stop reason: SDUPTHER

## 2023-02-27 NOTE — PROGRESS NOTES
Chief Complaint   Patient presents with   • Rash         History:  Rachel Sierra is a 62 y.o. female who presents today for evaluation of the above problems.          Rash 4 days.  Marck a temp of 102.8 first day.  Doesn't think she's had fever since.      Uterine cancer, last radiation treatment in December.  Rash is not just in area of treatment.  The rash is spreading.      ROS:  Review of Systems  As above    Allergies   Allergen Reactions   • Honey Anaphylaxis     Anaphylaxis as a child - edema at lips as adult.   • Paclitaxel Unknown - High Severity     Chest pain   • Sulfa Antibiotics Rash and Swelling     Edema throat, hands and face -  Hospitalized for 2 days.   • Bupropion Hives   • Nuts Other (See Comments)     Blood in stools     Past Medical History:   Diagnosis Date   • Abnormal Pap smear of cervix 3-2022    Had uterine cancer   • Allergic Years ago    Sulfa, wellbutin , nuts, honey   • Arthritis 2005    Lower back   • Cholelithiasis 2-4-2013    Removed   • Colon polyp 10-3-2017    2 durning colonoscopy   • Depression 2000    Took med for a year   • Diverticulosis Ct scan 1/2023    Noted on recent CT scan   • Endometrial carcinoma (HCC)    • Fibroid 2015    Endometrial biopsy   • Low back pain 2005    Lower back   • Obesity 2000   • PONV (postoperative nausea and vomiting)    • Post-menopausal bleeding    • Seasonal allergies    • Urinary tract infection Periodically throughout my life   • Varicella Childhood     Past Surgical History:   Procedure Laterality Date   • BREAST BIOPSY Left     lipoma   • CHOLECYSTECTOMY     • COLONOSCOPY  2017    2polops   • ENDOMETRIAL BIOPSY  2015   • LAPAROSCOPIC ASSISTED VAGINAL HYSTERECTOMY SALPINGO OOPHORECTOMY  4-2022    Total robotic laparoscopic hysterectomy   • LIPOMA EXCISION      MULTIPLE   • TONSILLECTOMY     • TOTAL LAPAROSCOPIC HYSTERECTOMY N/A 04/07/2022    Procedure: Robotic assisted total laparoscopic hysterectomy, bilateral salpingoophorecotmy,  "sentinel lymph node mapping.;  Surgeon: Adela Jay DO;  Location:  [facility];  Service: Robotics - DaVinci;  Laterality: N/A;   • VENOUS ACCESS DEVICE (PORT) INSERTION N/A 05/09/2022    Procedure: SINGLE LUMEN PORT PLACEMENT;  Surgeon: Flora De La Cruz MD;  Location:  [facility];  Service: General;  Laterality: N/A;     Family History   Problem Relation Age of Onset   • Lung cancer Mother    • Osteoporosis Mother    • Cancer Mother         Lung cancer   • Cancer Father    • Anemia Father    • Breast cancer Father    • Early death Father         Aplastic anemia   • Melanoma Sister    • Melanoma Sister         Skin , surgery done   • Depression Sister         Longer time,   • Melanoma Sister    • Alcohol abuse Maternal Uncle    • Cancer Maternal Grandfather    • Alcohol abuse Maternal Grandfather    • Ovarian cancer Neg Hx    • Colon cancer Neg Hx    • Malig Hyperthermia Neg Hx      Rachel  reports that she has never smoked. She has never used smokeless tobacco. She reports current alcohol use of about 1.0 - 2.0 standard drink per week. She reports that she does not use drugs.    I have reviewed and updated the above documentation (if necessary) including but not limited to chief complaint, ROS, PFSH, allergies and medications        Current Outpatient Medications:   •  cephalexin (Keflex) 500 MG capsule, Take 1 capsule by mouth 2 (Two) Times a Day., Disp: 14 capsule, Rfl: 0    OBJECTIVE:  Visit Vitals  /82 (BP Location: Right arm, Patient Position: Sitting, Cuff Size: Adult)   Pulse 88   Temp 98.6 °F (37 °C) (Oral)   Ht 162.6 cm (64\")   SpO2 97%   BMI 39.31 kg/m²      Physical Exam  Vitals and nursing note reviewed.   Constitutional:       General: She is not in acute distress.     Appearance: Normal appearance. She is not ill-appearing, toxic-appearing or diaphoretic.   HENT:      Head: Normocephalic and atraumatic.   Cardiovascular:      Rate and Rhythm: Normal rate.   Pulmonary:      Effort: " Pulmonary effort is normal. No respiratory distress.   Musculoskeletal:      Right lower leg: Edema (trace) present.      Left lower leg: Edema (trace) present.   Skin:     General: Skin is warm and dry.      Findings: Erythema present.             Comments: Large area of cellulitic-appearing skin on the right lower abdomen, extending into right groin, around right hip, and into right low back.  Mildly edematous.  No area of open skin/sore appreciated. No vesicles.   Neurological:      Mental Status: She is alert and oriented to person, place, and time.   Psychiatric:         Mood and Affect: Mood normal.         Behavior: Behavior normal.         Thought Content: Thought content normal.         Judgment: Judgment normal.         [facility]    Assessment/Plan    Diagnoses and all orders for this visit:    1. Cellulitis of other specified site (Primary)  -     cephalexin (Keflex) 500 MG capsule; Take 1 capsule by mouth 2 (Two) Times a Day.  Dispense: 14 capsule; Refill: 0      I asked Dr. Campa to look at the patient with me.  Feel this represents cellulitis at this time. Treat with antibiotics with close follow-up in 48-72 hours. I outlined the erythema borders with pen and asked patient to follow up sooner if the redness is extending past the pen yehuda or if she develops fever again.    Education materials and an After Visit Summary were printed and given to the patient at discharge.  Return in about 3 days (around 3/2/2023) for follow up for re-check.         DANIEL Crandall   16:33 CST  2/27/2023

## 2023-03-02 ENCOUNTER — TELEPHONE (OUTPATIENT)
Dept: INTERNAL MEDICINE | Facility: CLINIC | Age: 63
End: 2023-03-02

## 2023-03-02 NOTE — TELEPHONE ENCOUNTER
Caller: Rachel Sierra    Relationship: Self    Best call back number: [phone]    What is the best time to reach you: ANY    Who are you requesting to speak with (clinical staff, provider,  specific staff member): CLINICAL    What was the call regarding:     PATIENT STATES HER  TESTED POSITIVE FOR COVID-19, HOWEVER SHE IS STILL TESTING NEGATIVE. PATIENT IS WONDERING IF SHE STILL COULD COME IN TO DO LAB WORK ON 03.02.23 OR IF SHE WOULD NEED TO WAIT A FEW DAYS?    Do you require a callback: YES

## 2023-03-03 ENCOUNTER — LAB (OUTPATIENT)
Dept: LAB | Facility: HOSPITAL | Age: 63
End: 2023-03-03
Payer: COMMERCIAL

## 2023-03-03 ENCOUNTER — HOSPITAL ENCOUNTER (OUTPATIENT)
Dept: CT IMAGING | Facility: HOSPITAL | Age: 63
Discharge: HOME | End: 2023-03-03
Payer: COMMERCIAL

## 2023-03-03 ENCOUNTER — HOSPITAL ENCOUNTER (OUTPATIENT)
Dept: ULTRASOUND IMAGING | Facility: HOSPITAL | Age: 63
Discharge: HOME | End: 2023-03-03
Payer: COMMERCIAL

## 2023-03-03 DIAGNOSIS — R60.9 EDEMA, UNSPECIFIED TYPE: ICD-10-CM

## 2023-03-03 DIAGNOSIS — R60.9 EDEMA, UNSPECIFIED TYPE: Primary | ICD-10-CM

## 2023-03-03 DIAGNOSIS — R79.89 ELEVATED D-DIMER: ICD-10-CM

## 2023-03-03 DIAGNOSIS — R06.02 SHORTNESS OF BREATH: ICD-10-CM

## 2023-03-03 LAB
ANION GAP SERPL CALCULATED.3IONS-SCNC: 12 MMOL/L (ref 5–15)
BUN SERPL-MCNC: 10 MG/DL (ref 8–23)
BUN/CREAT SERPL: 16.1 (ref 7–25)
CALCIUM SPEC-SCNC: 9.3 MG/DL (ref 8.6–10.5)
CHLORIDE SERPL-SCNC: 105 MMOL/L (ref 98–107)
CO2 SERPL-SCNC: 26 MMOL/L (ref 22–29)
CREAT SERPL-MCNC: 0.62 MG/DL (ref 0.57–1)
D DIMER PPP FEU-MCNC: 0.95 MCGFEU/ML (ref 0–0.64)
EGFRCR SERPLBLD CKD-EPI 2021: 99.6 ML/MIN/1.73
GLUCOSE SERPL-MCNC: 111 MG/DL (ref 65–99)
POTASSIUM SERPL-SCNC: 4.8 MMOL/L (ref 3.5–5.2)
SODIUM SERPL-SCNC: 143 MMOL/L (ref 136–145)

## 2023-03-03 PROCEDURE — 85379 FIBRIN DEGRADATION QUANT: CPT

## 2023-03-03 PROCEDURE — 93970 EXTREMITY STUDY: CPT | Performed by: SURGERY

## 2023-03-03 PROCEDURE — 80048 BASIC METABOLIC PNL TOTAL CA: CPT

## 2023-03-03 PROCEDURE — 25510000001 IOPAMIDOL PER 1 ML: Performed by: NURSE PRACTITIONER

## 2023-03-03 PROCEDURE — 93970 EXTREMITY STUDY: CPT

## 2023-03-03 PROCEDURE — 71275 CT ANGIOGRAPHY CHEST: CPT

## 2023-03-03 PROCEDURE — 36415 COLL VENOUS BLD VENIPUNCTURE: CPT

## 2023-03-03 RX ADMIN — IOPAMIDOL 63 ML: 755 INJECTION, SOLUTION INTRAVENOUS at 09:59

## 2023-03-03 NOTE — PROGRESS NOTES
I called patient and left a message, but will you please call scheduling and call patient back with a time for CTA chest and US venous both lower extremities today? She has had shortness of breath and edema and is at risk for blood clots due to recent cancer and chemotherapy. The lab she had checked yesterday shows her D-dimer is high. This can be a sign of blood clots, and we need to check her for this. A blood clot to her lung could cause death. I have left this in the message, but will you please call her again to follow up? If she is unable to get to have the tests today, I think she needs to go to the ER ASAP for further evaluation. Thank you!

## 2023-03-06 DIAGNOSIS — L03.818 CELLULITIS OF OTHER SPECIFIED SITE: ICD-10-CM

## 2023-03-06 RX ORDER — CEPHALEXIN 500 MG/1
500 CAPSULE ORAL 2 TIMES DAILY
Qty: 6 CAPSULE | Refills: 0 | Status: SHIPPED | OUTPATIENT
Start: 2023-03-06 | End: 2023-03-21

## 2023-03-18 NOTE — PROGRESS NOTES
"[facility] HEMATOLOGY & ONCOLOGY  [address]  [facility] [address] [phone]    Patient Name: Rachel Sierra  Encounter Date: 03/28/2023  YOB: 1960  Patient Number: [id number]       REASON FOR VISIT: Rachel Sierra is a 62-year-old female who returns in follow-up of stage FIGO IA endometrial carcinoma.  She underwent robotic assisted total laparoscopic hysterectomy, bilateral salpingo-oophorectomy, sentinel lymph node mapping, 04/07/2022.  She received adjuvant carboplatin+ paclitaxel, cycle 3 on 06/30/2022 and radiation in \"sandwich\" fashion beginning 07/19/2022-08/30/2022 (4500 cGy in 28 fx). C4 paclitaxel infusion stopped due to chest pain.  Resumed C4D1, 09/15/2022 with alternative Abraxane+carboplatin.  Completed C6D15, 12/01/2022 (4 months ago).  She is here alone (previously with her spouse, Pablo).    I have reviewed the HPI and verified with the patient the accuracy of it. No changes to interval history since the information was documented. Js Lima MD 03/28/23     Diagnostic abnormalities:  --02/24/2022- transvaginal ultrasound--uterus 10 cm length, 3.9 cm fundal fibroid.  Endometrial thickness 27 mm, ovaries normal.  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  --03/07/2022- BRCA 1/2: Negative-no clinically significant variants identified  --03/17/2022-mammogram-persistent nodular densities within the right retroareolar and left medial breast corresponding with benign cysts versus ductal ectasia in the medial left breast.  Finding consistent with benign process.  Return to annual screening mammography recommended, March 2023.  BI-RADS 2 benign exam.  --04/06/2022- BMP normal.  CBC normal.  --04/27/2022 --consult by Dr. Miller.  Recommends treatment to the pelvis with curative radiation therapy, anticipate 4500 cGy over " "25-28 fractions, final course pending completion of planning.  In addition recommend 3 HDR brachytherapy fractions, final course pending.  --05/05/2022-- CT chest-no acute abnormality no evidence of neoplastic disease within the chest.  -- 05/05/2022-CT abdomen/pelvis--3-4 cm soft tissue \"mass\" at the left pelvic sidewall for which neoplastic lymphadenopathy is favored given the history of endometrial carcinoma. Mildly dilated right ureter extending into the upper pelvis where there appears to be a focus of scarring. There is no high-grade ureteral obstruction. Addendum: This examination is reviewed with and discussed with Dr. Miller. The previously described 3-4 cm soft tissue mass along the left pelvic  sidewall is seen to have fluid density. The low-density and discrete margins support the idea that this represents a lymphocele rather than neoplastic lymphadenopathy. Ultrasound evaluation is planned to confirm this. If ultrasound is not helpful pre and postcontrast pelvic MRI should be  Considered.  -- 05/17/2022- ultrasound pelvis-Reidentified LEFT iliac chain collection/lesion measuring 3.0 x 3.7 x 2.7 cm. There are some low-level internal echoes within this lesion. No definite internal vascularity. Favor this to represent a postoperative lymphocele, especially given recent niecy dissection in this region. However this area is incompletely characterized by ultrasound and continued follow-up is recommended to confirm stability or resolution. Alternatively, MRI pelvis would provide better characterization.    Previous interventions:  --04/07/2022- robotic assisted total laparoscopic hysterectomy, bilateral salpingo-oophorectomy, sentinel lymph node mapping by Dr. Jay.  --Final diagnosis:1. Uterus, Cervix, Bilateral Fallopian Tubes and Ovaries, Hysterectomy with Bilateral Salpingo-oophorectomy         (236 grams): High-grade endometrial carcinoma.               A. Endometrium:                           1. " "High-grade serous carcinoma.                          2. The tumor measures 4.2 x 3.5 x 1.5 cm.                          3. The tumor extends into the myometrium a total of 6.25 mm out of a total thickness of 13 mm       (48%)  4. No definitive capillary lymphatic invasion is identified.   5. No definitive perineural invasion is seen.  6. Adenomyosis is identified with no definitive tumor involving the adenomyosis.  7. Parametrium not involved.              8. The tumor extends into the lower uterine segment but does not invade the stroma.  B. Benign cervix with               1. Nabothian cysts.  C. Benign parametria.  D. Benign right fallopian tube.  E. Benign right ovary.  F. Benign left fallopian tube.  G. Benign ovary.              H. Benign serosa.   2. Lymph Nodes, Right Obturator [place] Lymph Node, Excision:                A. Three benign lymph nodes.   3. Lymph Nodes, Left Obturator [place] Lymph Node, Excision:               A. Three benign lymph nodes.     -- Adjuvant carboplatin+ paclitaxel:  C1, 05/19/2022; C2, 06/09/2022; C3, 06/30/2022; C4, 09/06/2022-infusion stopped due to chest pain  -- \"sandwich\" treatment to the pelvis with curative radiation therapy, beginning 07/19/2022 -08/30/3033 (4500 cGy in 28 fractions).  In addition recommend 3 HDR brachytherapy fractions, final course pending.  --Adjuvant carboplatin +Abraxane:  C4D1, 09/15/2022; C4D8, 09/22/2022; C4D15, 10/06/2022; C5D1, 10/13/2022; C5D8, 10/20/2022; C5D15, 10/27/2022      LABS    Lab Results - Last 18 Months   Lab Units 03/21/23  0922 12/21/22  0953 12/01/22  0954 11/17/22  0811 11/10/22  0814 11/02/22  0931 10/27/22  0823 10/20/22  1037 10/13/22  1035 08/10/22  0754 07/07/22  0827 06/23/22  1418 06/16/22  1423 06/02/22  1424 05/26/22  1407   HEMOGLOBIN g/dL 13.5 11.3* 10.7* 11.5* 12.6 12.4 11.0* 12.2 12.6   < > 12.2   < > 13.1   < > 12.7   HEMATOCRIT % 43.4 35.2 33.6* 35.6 37.0 36.4 33.0* 35.7 38.0   < > 37.4   < > 40.5   < > 39.3 "   MCV fL 87.7 97.0 94.9 93.0 90.9 90.8 90.2 89.7 89.8   < > 89.0   < > 88.0   < > 86.2   WBC 10*3/mm3 3.97 3.89 4.13 2.33* 5.81 3.94 1.72* 2.75* 6.73   < > 6.13   < > 13.26*   < > 12.78*   RDW % 13.9 17.7* 19.9* 16.4* 16.7* 16.2* 13.8 13.4 13.2   < > 17.6*   < > 16.3*   < > 14.6   MPV fL 9.8 9.1 9.6 9.9 9.5 9.3 9.6 9.5 9.5   < > 10.4   < > 10.8   < > 10.6   PLATELETS 10*3/mm3 187 273 179 164 246 176 136* 182 234   < > 159   < > 206   < > 168   IMM GRAN % % 0.3 1.5*  --  1.3* 5.0* 1.5*  --  1.1*  --    < >  --    < >  --    < >  --    NEUTROS ABS 10*3/mm3 2.89 2.97 2.90 1.77 4.51 3.36 1.22* 2.13 5.86   < > 4.02   < > 7.62*   < > 7.00   LYMPHS ABS 10*3/mm3 0.59* 0.45* 0.42* 0.34* 0.54* 0.34*  --  0.36*  --    < >  --    < >  --    < >  --    MONOS ABS 10*3/mm3 0.39 0.35 0.63 0.18 0.43 0.17  --  0.22  --    < >  --    < >  --    < >  --    EOS ABS 10*3/mm3 0.07 0.04 0.00 0.00 0.00 0.00  --  0.00  --    < > 0.12   < > 0.15   < > 0.41*   BASOS ABS 10*3/mm3 0.02 0.02 0.02 0.01 0.04 0.01  --  0.01  --    < > 0.06   < >  --    < >  --    IMMATURE GRANS (ABS) 10*3/mm3 0.01 0.06*  --  0.03 0.29* 0.06*  --  0.03  --    < >  --    < >  --    < >  --    NRBC /100 WBC 0.0 0.5*  --  0.0 1.9* 0.5* 2.0* 0.0 1.0*   < >  --    < >  --    < >  --    NEUTROPHIL % %  --   --   --   --   --   --  69.0  --  84.0*  --  61.6  --  45.7  --  42.1*   MONOCYTES % %  --   --   --   --   --   --  10.0  --  5.0  --  11.1  --  16.0*  --  13.7*   BASOPHIL % %  --   --   --   --   --   --   --   --   --   --  1.0  --   --   --   --    ATYP LYMPH % %  --   --   --   --   --   --  2.0  --   --   --  5.1*  --  2.1  --  6.3*   ANISOCYTOSIS   --   --   --   --   --   --  Slight/1+  --  Slight/1+  --   --   --   --   --   --     < > = values in this interval not displayed.       Lab Results - Last 18 Months   Lab Units 03/21/23 0922 03/03/23  0721 12/21/22  0953 12/01/22  0954 11/17/22  0811 11/10/22  0814 11/02/22  0931   GLUCOSE mg/dL 107* 111* 102* 93  115* 114* 129*   SODIUM mmol/L 139 143 139 140 138 140 140   POTASSIUM mmol/L 4.7 4.8 3.8 4.5 4.4 4.5 4.0   CO2 mmol/L 28.0 26.0 28.0 27.0 30.0* 29.0 26.0   CHLORIDE mmol/L 102 105 104 103 98 101 103   ANION GAP mmol/L 9.0 12.0 7.0 10.0 10.0 10.0 11.0   CREATININE mg/dL 0.63 0.62 0.69 0.59 0.55* 0.72 0.55*   BUN mg/dL 8 10 12 19 17 19 18   BUN / CREAT RATIO  12.7 16.1 17.4 32.2* 30.9* 26.4* 32.7*   CALCIUM mg/dL 9.7 9.3 9.4 9.5 9.9 9.7 9.7   ALK PHOS U/L 80  --  80 63 58 62 69   TOTAL PROTEIN g/dL 6.6  --  6.2 6.7 6.6 6.5 7.0   ALT (SGPT) U/L 21  --  18 21 24 24 22   AST (SGOT) U/L 25  --  20 20 17 17 16   BILIRUBIN mg/dL 0.2  --  0.2 0.3 0.4 0.3 0.2   ALBUMIN g/dL 4.0  --  3.70 4.10 4.30 4.30 4.30   GLOBULIN gm/dL 2.6  --  2.5 2.6 2.3 2.2 2.7         PAST MEDICAL HISTORY:  ALLERGIES:  Allergies   Allergen Reactions   • Honey Anaphylaxis     Anaphylaxis as a child - edema at lips as adult.   • Paclitaxel Unknown - High Severity     Chest pain   • Sulfa Antibiotics Rash and Swelling     Edema throat, hands and face -  Hospitalized for 2 days.   • Bupropion Hives   • Nuts Other (See Comments)     Blood in stools     CURRENT MEDICATIONS:  Outpatient Encounter Medications as of 3/28/2023   Medication Sig Dispense Refill   • doxycycline (VIBRAMYCIN) 100 MG capsule Take 1 capsule by mouth 2 (Two) Times a Day for 7 days. 14 capsule 0   • [DISCONTINUED] cephalexin (Keflex) 500 MG capsule Take 1 capsule by mouth 2 (Two) Times a Day. (Patient not taking: Reported on 3/21/2023) 6 capsule 0     No facility-administered encounter medications on file as of 3/28/2023.     Adult illnesses:  Endometrial carcinoma  Seasonal allergies    Past surgeries:  Left breast biopsy-lipoma  Cholecystectomy  Endometrial biopsy, 2015  Multiple lipoma excisions  Tonsillectomy  Endometrial biopsy, 02/24/2022  Robotic assisted total laparoscopic hysterectomy/bilateral salpingo-oophorectomy/sentinel lymph node mapping, vaginal laceration repair,  04/17/2022-Dr. Jay  Left subclavian Mediport, single-lumen, 05/09/2022- Dr. De La Cruz      ADULT ILLNESSES:  Patient Active Problem List   Diagnosis Code   • Uterine cancer (HCC) C55   • BMI 36.0-36.9,adult Z68.36   • Endometrial carcinoma (HCC) C54.1   • Fitting and adjustment of vascular catheter Z45.2   • Non-smoker Z78.9   • S/P KIMMY-BSO (total abdominal hysterectomy and bilateral salpingo-oophorectomy) Z90.710, Z90.722, Z90.79   • Diarrhea R19.7   • Nausea R11.0   • Chemotherapy-induced neutropenia (HCC) D70.1, T45.1X5A   • History of radiation therapy Z92.3   • Colon polyps K63.5     SURGERIES:  Past Surgical History:   Procedure Laterality Date   • BREAST BIOPSY Left     lipoma   • CHOLECYSTECTOMY     • COLONOSCOPY  2017    2polops   • ENDOMETRIAL BIOPSY  2015   • LAPAROSCOPIC ASSISTED VAGINAL HYSTERECTOMY SALPINGO OOPHORECTOMY  4-2022    Total robotic laparoscopic hysterectomy   • LIPOMA EXCISION      MULTIPLE   • TONSILLECTOMY     • TOTAL LAPAROSCOPIC HYSTERECTOMY N/A 04/07/2022    Procedure: Robotic assisted total laparoscopic hysterectomy, bilateral salpingoophorecotmy, sentinel lymph node mapping.;  Surgeon: Adela Jay DO;  Location: [facility];  Service: Robotics - [facility];  Laterality: N/A;   • VENOUS ACCESS DEVICE (PORT) INSERTION N/A 05/09/2022    Procedure: SINGLE LUMEN PORT PLACEMENT;  Surgeon: Flora De La Cruz MD;  Location: [facility] OR;  Service: General;  Laterality: N/A;     HEALTH MAINTENANCE ITEMS:  Health Maintenance Due   Topic Date Due   • Pneumococcal Vaccine 0-64 (1 - PCV) Never done   • ZOSTER VACCINE (1 of 2) Never done   • ANNUAL PHYSICAL  Never done       <no information>  Last Completed Colonoscopy          COLORECTAL CANCER SCREENING (COLONOSCOPY - Every 10 Years) Next due on 10/3/2027    10/03/2017  COLONOSCOPY (Done - Had polyps, due in October for repeat, scheduled with GI)              Immunization History   Administered Date(s) Administered   • COVID-19 (MODERNA)  "1st, 2nd, 3rd Dose Only 03/23/2021, 04/20/2021, 12/01/2021   • COVID-19 (MODERNA) BIVALENT BOOSTER 12+YRS 01/13/2023, 01/13/2023   • COVID-19 (MODERNA) BOOSTER 12/01/2021   • Flu Vaccine Split Quad 01/05/2009   • FluLaval/Fluzone >6mos 12/01/2021   • Flublok 18+yrs 01/05/2023   • Influenza, Unspecified 12/01/2021   • TD Preservative Free 01/04/2008   • Tdap 08/16/2017     Last Completed Mammogram          Scheduled - MAMMOGRAM (Every 2 Years) Scheduled for 11/13/2023 03/17/2022  Mammo Diagnostic Digital Tomosynthesis Bilateral With CAD    03/01/2022  Mammo Screening Digital Tomosynthesis Bilateral With CAD    08/23/2017  Done                  FAMILY HISTORY:  Family History   Problem Relation Age of Onset   • Lung cancer Mother    • Osteoporosis Mother    • Cancer Mother         Lung cancer   • Cancer Father    • Anemia Father    • Breast cancer Father    • Early death Father         Aplastic anemia   • Melanoma Sister    • Melanoma Sister         Skin , surgery done   • Depression Sister         Longer time,   • Melanoma Sister    • Alcohol abuse Maternal Uncle    • Cancer Maternal Grandfather    • Alcohol abuse Maternal Grandfather    • Ovarian cancer Neg Hx    • Colon cancer Neg Hx    • Malig Hyperthermia Neg Hx      SOCIAL HISTORY:  Social History     Socioeconomic History   • Marital status:    Tobacco Use   • Smoking status: Never   • Smokeless tobacco: Never   Vaping Use   • Vaping Use: Never used   Substance and Sexual Activity   • Alcohol use: Yes     Alcohol/week: 1.0 - 2.0 standard drink     Types: 1 - 2 Cans of beer per week     Comment: None since undergoing chemo and radiation.   • Drug use: Never   • Sexual activity: Yes     Partners: Male     Birth control/protection: Post-menopausal, None     Comment:  had vasectomy in 20’s I didn’t use contraception       REVIEW OF SYSTEMS:  Review of Systems   Constitutional: Negative for activity change (\"Back to the usual baseline\"), chills, " "diaphoresis, fatigue (\"all better\") and fever.        Manages her ADLs, to include some chores, errands and driving.  Is still up and about, \"all the time.\"        HENT: Positive for postnasal drip.    Eyes: Negative.    Respiratory: Negative.  Negative for cough and shortness of breath.    Cardiovascular: Negative.    Gastrointestinal: Negative for diarrhea (None since completion of RT) and rectal pain (Previously on days 4, 5 after chemo from hemorrhoids- readily relieved by decadron + benadryl.  \"All gone\").   Endocrine: Negative.  Negative for heat intolerance.   Genitourinary: Negative for dysuria, frequency, hematuria, pelvic pain, urinary incontinence (No longer uses pads) and vaginal bleeding.        \"Vaginal spotting\" post-op resolved   Musculoskeletal: Negative.    Skin: Negative.    Allergic/Immunologic: Positive for environmental allergies (\"Hayfever\").        Allergic to sulfa   Neurological: Negative.    Hematological: Negative.    Psychiatric/Behavioral: Negative.      /76   Pulse 65   Temp 95.1 °F (35.1 °C)   Resp 18   Ht 162.6 cm (64\")   Wt 101 kg (222 lb 4.8 oz)   SpO2 97%   BMI 38.16 kg/m²  Body surface area is 2.05 meters squared.  Pain Score    03/28/23 0937   PainSc: 0-No pain         Physical Exam  Vitals reviewed.   Constitutional:       Appearance: She is obese.      Comments: Pleasant, cooperative, obese, modestly kept female.  Ambulatory.  ECOG 0.      She has gained 1 lb (in addition to 17 lb at her 2 prior visits- had lost 9 lb at her visit prior to those) since her last visit    HENT:      Head: Normocephalic and atraumatic.      Mouth/Throat:      Comments: Is wearing a surgical mask today  Eyes:      General: No scleral icterus.     Extraocular Movements: Extraocular movements intact.      Pupils: Pupils are equal, round, and reactive to light.   Cardiovascular:      Rate and Rhythm: Normal rate.   Pulmonary:      Effort: Pulmonary effort is normal.      Comments: Port in " the left upper chest is well-seated  Abdominal:      Palpations: Abdomen is soft.      Tenderness: There is no abdominal tenderness.      Comments: Laparoscopy incisions are all well approximated and healed   Musculoskeletal:         General: No swelling. Normal range of motion.      Cervical back: Normal range of motion.      Right lower leg: No edema.      Left lower leg: No edema.   Lymphadenopathy:      Cervical: No cervical adenopathy.   Skin:     General: Skin is warm.   Neurological:      General: No focal deficit present.      Mental Status: She is alert and oriented to person, place, and time.   Psychiatric:         Mood and Affect: Mood normal.         Behavior: Behavior normal.         Thought Content: Thought content normal.           Assessment:  1.    Serous carcinoma of the endometrium  · Stage: FIGO IA (pT1a, pN0(sn), pM0).    · Tumor [place]: The tumor measures 4.2 x 3.5 x 1.5 cm. Extends into the myometrium a total of 6.25 mm out of a total thickness of 13 mm (48%).  Extends into the lower uterine segment but does not invade the stroma. 6 regional lymph nodes negative for tumor cells   · Complications of Tumor: Postmenopausal bleeding  · Mismatch repair (MMR) proteins: Pending  · p16: strong (+)  · Pax8:  strong (+)  · Her2: 1+  · ER/PA:(-)/(+) patchy  · Tumor Status:-- 04/07/2022- robotic assisted total laparoscopic hysterectomy, bilateral salpingo-oophorectomy, sentinel lymph node mapping by Dr. Jay.  · : 13.1, 06/02/2022 (prior: 37.1)  · Adjuvant carboplatin and Taxol in progress- Taxol stopped on C4, 09/06/2022 due to chest pain   · Adjuvant radiation (EBRT- completed 08/30/2022- 4500 cGy/28 fx).  HDR brachytherapy pending?  · Adjuvant Abraxane +carboplatin initiated, N1D4-28/15/2022- C6D15, 12/01/2022  · 12/5/2022- Gyn follow-up with DANIEL Junior- Gyn-Exam:   Cervix, Uterus and Adnexa are surgically absent. Urethra and urethral meatus normal.  Bladder, normal no prolapse.   "Perineum and anus examined and without lesions.  · 12/5/2022-Pap smear-atypical glandular cells (AGC), NOS.  Other findings: Reactive cellular changes associated with radiation.  Atrophy with inflammation.  HPV Aptima: Not detected  · 1/3/09032- CT abdomen/pelvis- 1. No evidence of intra-abdominal or pelvic metastatic disease. 2. Interval resolution of a cystic mass in the left pelvic sidewall region. 3. Prior cholecystectomy and hysterectomy. 4. Mild sigmoid diverticulosis without diverticulitis. 5. Mild soft tissue fullness with an oval shape in the region of the left labia/introitus, unchanged. Given the lack of change is suggested benign process.     2.    Thrombocytopenia.  Chemo effect  --Resolved. 187,000, 3/21/2023 (prior range: 76,000-494,000)  3.    Leukopenia with normal ANC.  Chemo associated.  Resolved  4.    Normocytic anemia.  Chemo associated.    -- Hgb 13.5; MCV 87.7, 3/21/2023 (prior: Hgb 10.7-12.6; MCV 90.2-94.9)  5.    Mild weight loss due to dysgeusia from chemo.  Resolved  6.    Taxol intolerance  7.    Hemorrhoids.  Worse after chemo.  On decadron for 2 days - days 4, and 5.  \"All better.\"     Plan:  1.  Apprised of labs, 3/3/2023 with glucose 107 otherwise normal,  16.3 (prior: 11.1-37.1), WBC 3.97/ANC 2.89, normalized anemia otherwise normal CBC.      2.   Apprised of CT abdomen/pelvis, 1/3/23 (above).  RAJNI  3.   Prior office visit, 12/5/2022 with DANIEL Junior with Gyn.  Exam:   Cervix, Uterus and Adnexa are surgically absent. Urethra and urethral meatus normal.  Bladder, normal no prolapse.  Perineum and anus examined and without lesions.  Normal GYN exam.   4.   Previously apprised of pap smear, 12/5/2022 (above):  atypical glandular cells (AGC), NOS.  Other findings: Reactive cellular changes associated with radiation.  Atrophy with inflammation.  HPV Aptima: Not detected    5.  Prior visit with Dr. Miller, 12/01/2022- follow up 1 year  6.  Review NCCN guidelines " version 1.2022 endometrial carcinoma-biopsy findings: Serous carcinoma following primary treatment with KIMMY/BSO, surgical staging and maximal tumor debulking.  For invasive stage IA-additional treatment: Systemic therapy (preferred regimens: Carboplatin/paclitaxel- primary adjuvant treatment with use for uterine confined high risk disease) +/-EBRT+/-vaginal brachytherapy-followed by surveillance: Physical exam every 3-6 months for 2-3 years, then every 6 months for up to 5 years then annually.  CA-125 if initially elevated.  Imaging as clinically indicated (abdominal/pelvic and/or chest CT recommended based upon symptoms or physical exam findings).     7.  Continue other currently identified medications.   8.  Refer to GI Re:  Symptomatic hemorrhoids.  Past due for c-scope-@nd request  9.   Continue follow-up with pcp (now sees S[facility]) and other specialists  10.  Schedule port flushes q 8 weeks-due today  11.  Reappoint to Gyn Re:  Needs exam as indicated by NCCN guidelines (above)  12.  Return to the office in 24 weeks with pre-office CBC with differential, CMP, .      I spent ~ 56 minutes caring for Rachel on this date of service. This time includes time spent by me in the following activities: preparing for the visit, reviewing tests, performing a medically appropriate examination and/or evaluation, counseling and educating the patient/family/caregiver, ordering medications, tests, or procedures and documenting information in the medical record

## 2023-03-21 ENCOUNTER — PATIENT MESSAGE (OUTPATIENT)
Dept: INTERNAL MEDICINE | Facility: CLINIC | Age: 63
End: 2023-03-21
Payer: COMMERCIAL

## 2023-03-21 ENCOUNTER — LAB (OUTPATIENT)
Dept: LAB | Facility: HOSPITAL | Age: 63
End: 2023-03-21
Payer: COMMERCIAL

## 2023-03-21 ENCOUNTER — OFFICE VISIT (OUTPATIENT)
Dept: INTERNAL MEDICINE | Facility: CLINIC | Age: 63
End: 2023-03-21
Payer: COMMERCIAL

## 2023-03-21 VITALS
WEIGHT: 225 LBS | HEIGHT: 64 IN | BODY MASS INDEX: 38.41 KG/M2 | OXYGEN SATURATION: 97 % | TEMPERATURE: 97.5 F | SYSTOLIC BLOOD PRESSURE: 122 MMHG | RESPIRATION RATE: 18 BRPM | DIASTOLIC BLOOD PRESSURE: 74 MMHG | HEART RATE: 91 BPM

## 2023-03-21 DIAGNOSIS — L58.1 LATE RADIATION DERMATITIS: Primary | ICD-10-CM

## 2023-03-21 DIAGNOSIS — C54.1 ENDOMETRIAL CARCINOMA: ICD-10-CM

## 2023-03-21 LAB
ALBUMIN SERPL-MCNC: 4 G/DL (ref 3.5–5.2)
ALBUMIN/GLOB SERPL: 1.5 G/DL
ALP SERPL-CCNC: 80 U/L (ref 39–117)
ALT SERPL W P-5'-P-CCNC: 21 U/L (ref 1–33)
ANION GAP SERPL CALCULATED.3IONS-SCNC: 9 MMOL/L (ref 5–15)
AST SERPL-CCNC: 25 U/L (ref 1–32)
BASOPHILS # BLD AUTO: 0.02 10*3/MM3 (ref 0–0.2)
BASOPHILS NFR BLD AUTO: 0.5 % (ref 0–1.5)
BILIRUB SERPL-MCNC: 0.2 MG/DL (ref 0–1.2)
BUN SERPL-MCNC: 8 MG/DL (ref 8–23)
BUN/CREAT SERPL: 12.7 (ref 7–25)
CALCIUM SPEC-SCNC: 9.7 MG/DL (ref 8.6–10.5)
CANCER AG125 SERPL QL: 16.3 U/ML (ref 0–38.1)
CHLORIDE SERPL-SCNC: 102 MMOL/L (ref 98–107)
CO2 SERPL-SCNC: 28 MMOL/L (ref 22–29)
CREAT SERPL-MCNC: 0.63 MG/DL (ref 0.57–1)
DEPRECATED RDW RBC AUTO: 44.6 FL (ref 37–54)
EGFRCR SERPLBLD CKD-EPI 2021: 99.2 ML/MIN/1.73
EOSINOPHIL # BLD AUTO: 0.07 10*3/MM3 (ref 0–0.4)
EOSINOPHIL NFR BLD AUTO: 1.8 % (ref 0.3–6.2)
ERYTHROCYTE [DISTWIDTH] IN BLOOD BY AUTOMATED COUNT: 13.9 % (ref 12.3–15.4)
GLOBULIN UR ELPH-MCNC: 2.6 GM/DL
GLUCOSE SERPL-MCNC: 107 MG/DL (ref 65–99)
HCT VFR BLD AUTO: 43.4 % (ref 34–46.6)
HGB BLD-MCNC: 13.5 G/DL (ref 12–15.9)
IMM GRANULOCYTES # BLD AUTO: 0.01 10*3/MM3 (ref 0–0.05)
IMM GRANULOCYTES NFR BLD AUTO: 0.3 % (ref 0–0.5)
LYMPHOCYTES # BLD AUTO: 0.59 10*3/MM3 (ref 0.7–3.1)
LYMPHOCYTES NFR BLD AUTO: 14.9 % (ref 19.6–45.3)
MCH RBC QN AUTO: 27.3 PG (ref 26.6–33)
MCHC RBC AUTO-ENTMCNC: 31.1 G/DL (ref 31.5–35.7)
MCV RBC AUTO: 87.7 FL (ref 79–97)
MONOCYTES # BLD AUTO: 0.39 10*3/MM3 (ref 0.1–0.9)
MONOCYTES NFR BLD AUTO: 9.8 % (ref 5–12)
NEUTROPHILS NFR BLD AUTO: 2.89 10*3/MM3 (ref 1.7–7)
NEUTROPHILS NFR BLD AUTO: 72.7 % (ref 42.7–76)
NRBC BLD AUTO-RTO: 0 /100 WBC (ref 0–0.2)
PLATELET # BLD AUTO: 187 10*3/MM3 (ref 140–450)
PMV BLD AUTO: 9.8 FL (ref 6–12)
POTASSIUM SERPL-SCNC: 4.7 MMOL/L (ref 3.5–5.2)
PROT SERPL-MCNC: 6.6 G/DL (ref 6–8.5)
RBC # BLD AUTO: 4.95 10*6/MM3 (ref 3.77–5.28)
SODIUM SERPL-SCNC: 139 MMOL/L (ref 136–145)
WBC NRBC COR # BLD: 3.97 10*3/MM3 (ref 3.4–10.8)

## 2023-03-21 PROCEDURE — 99213 OFFICE O/P EST LOW 20 MIN: CPT | Performed by: INTERNAL MEDICINE

## 2023-03-21 PROCEDURE — 85025 COMPLETE CBC W/AUTO DIFF WBC: CPT

## 2023-03-21 PROCEDURE — 86304 IMMUNOASSAY TUMOR CA 125: CPT

## 2023-03-21 PROCEDURE — 36415 COLL VENOUS BLD VENIPUNCTURE: CPT

## 2023-03-21 PROCEDURE — 80053 COMPREHEN METABOLIC PANEL: CPT

## 2023-03-21 NOTE — PROGRESS NOTES
Chief Complaint   Patient presents with   • Fever   • Rash     Patient states that she is not running fever this morning. Rash is at the bottom of her stomach.     Answers for HPI/ROS submitted by the patient on 3/21/2023  Please describe your symptoms.: Rash on abdomen. This was gone completely Monday and reappeared Sunday at 7 pm  Have you had these symptoms before?: Yes  How long have you been having these symptoms?: 5-7 days  Please list any medications you are currently taking for this condition.: None  Please describe any probable cause for these symptoms. : I dont Know but suspect radiation dermatitis.  The rash is swollen and hot.  It is sinsitive and some internal discomfort.  What is the primary reason for your visit?: Other        History:  Rachel Sierra is a 62 y.o. female who presents today for evaluation of the above problems.      She presents today for an acute visit.  She was seen by Gaby Conklin on February 27, 2023 and I reviewed her office note.  She was prescribed Keflex for skin infection.  As of March 1, 2023 her symptoms were much better and eventually resolved completely.   Within 2 hours this past Sunday symptoms returned just as before.    She finished radiation for endometrial cancer August 19, 2022.  December she finished chemotherapy.    She had fever which has resolved.  She also reports fever is not unusual for her and she develops fever any time anything changes with her body    HPI      ROS:  Review of Systems     As above    No current outpatient medications on file.    Lab Results   Component Value Date    GLUCOSE 107 (H) 03/21/2023    BUN 8 03/21/2023    CREATININE 0.63 03/21/2023    EGFR 99.2 03/21/2023    BCR 12.7 03/21/2023    K 4.7 03/21/2023    CO2 28.0 03/21/2023    CALCIUM 9.7 03/21/2023    ALBUMIN 4.0 03/21/2023    BILITOT 0.2 03/21/2023    AST 25 03/21/2023    ALT 21 03/21/2023       WBC   Date Value Ref Range Status   03/21/2023 3.97 3.40 - 10.80 10*3/mm3 Final      RBC   Date Value Ref Range Status   03/21/2023 4.95 3.77 - 5.28 10*6/mm3 Final     Hemoglobin   Date Value Ref Range Status   03/21/2023 13.5 12.0 - 15.9 g/dL Final     Hematocrit   Date Value Ref Range Status   03/21/2023 43.4 34.0 - 46.6 % Final     MCV   Date Value Ref Range Status   03/21/2023 87.7 79.0 - 97.0 fL Final     MCH   Date Value Ref Range Status   03/21/2023 27.3 26.6 - 33.0 pg Final     MCHC   Date Value Ref Range Status   03/21/2023 31.1 (L) 31.5 - 35.7 g/dL Final     RDW   Date Value Ref Range Status   03/21/2023 13.9 12.3 - 15.4 % Final     RDW-SD   Date Value Ref Range Status   03/21/2023 44.6 37.0 - 54.0 fl Final     MPV   Date Value Ref Range Status   03/21/2023 9.8 6.0 - 12.0 fL Final     Platelets   Date Value Ref Range Status   03/21/2023 187 140 - 450 10*3/mm3 Final     Neutrophil %   Date Value Ref Range Status   03/21/2023 72.7 42.7 - 76.0 % Final     Lymphocyte %   Date Value Ref Range Status   03/21/2023 14.9 (L) 19.6 - 45.3 % Final     Monocyte %   Date Value Ref Range Status   03/21/2023 9.8 5.0 - 12.0 % Final     Eosinophil %   Date Value Ref Range Status   03/21/2023 1.8 0.3 - 6.2 % Final     Basophil %   Date Value Ref Range Status   03/21/2023 0.5 0.0 - 1.5 % Final     Immature Grans %   Date Value Ref Range Status   03/21/2023 0.3 0.0 - 0.5 % Final     Neutrophils, Absolute   Date Value Ref Range Status   03/21/2023 2.89 1.70 - 7.00 10*3/mm3 Final     Lymphocytes, Absolute   Date Value Ref Range Status   03/21/2023 0.59 (L) 0.70 - 3.10 10*3/mm3 Final     Monocytes, Absolute   Date Value Ref Range Status   03/21/2023 0.39 0.10 - 0.90 10*3/mm3 Final     Eosinophils, Absolute   Date Value Ref Range Status   03/21/2023 0.07 0.00 - 0.40 10*3/mm3 Final     Basophils, Absolute   Date Value Ref Range Status   03/21/2023 0.02 0.00 - 0.20 10*3/mm3 Final     Immature Grans, Absolute   Date Value Ref Range Status   03/21/2023 0.01 0.00 - 0.05 10*3/mm3 Final     nRBC   Date Value Ref  "Range Status   03/21/2023 0.0 0.0 - 0.2 /100 WBC Final         OBJECTIVE:  Visit Vitals  /74 (BP Location: Left arm, Patient Position: Sitting, Cuff Size: Adult)   Pulse 91   Temp 97.5 °F (36.4 °C) (Temporal)   Resp 18   Ht 162.6 cm (64\")   Wt 102 kg (225 lb)   SpO2 97%   BMI 38.62 kg/m²      Physical Exam  Vitals and nursing note reviewed.   Constitutional:       General: She is not in acute distress.     Appearance: Normal appearance. She is not ill-appearing, toxic-appearing or diaphoretic.      Comments: Pleasant   HENT:      Head: Normocephalic and atraumatic.   Cardiovascular:      Rate and Rhythm: Normal rate.   Pulmonary:      Effort: Pulmonary effort is normal.   Skin:     General: Skin is warm and dry.      Findings: Erythema present.             Comments: Large area of erythematous skin on the right lower abdomen, extending around right hip, and into right low back.  Mildly edematous in the lower portion.  No area of open skin/sore appreciated. No vesicles.  There is some mild dry scaling of the skin   Neurological:      Mental Status: She is alert and oriented to person, place, and time.   Psychiatric:         Mood and Affect: Mood normal.         Behavior: Behavior normal.         Thought Content: Thought content normal.         Judgment: Judgment normal.         Assessment/Plan    Diagnoses and all orders for this visit:    1. Late radiation dermatitis (Primary)      I suspect she has radiation dermatitis.  Would recommend over-the-counter topical steroids.  I do not think she needs any other course of antibiotics.  I did examine her on February 27th and symptoms are very similar.  It is curious that the symptoms completely resolved and then recurred quite acutely within 2 hours this past Sunday.  Have sent a message over to Dr. Miller for his opinion.    Further work-up or management is necessary    21 minutes spent total on the day of the visit.    Return for Next scheduled follow up.      DTom " Romero Campa MD  11:17 CDT  3/21/2023

## 2023-03-22 RX ORDER — DOXYCYCLINE HYCLATE 100 MG/1
100 CAPSULE ORAL 2 TIMES DAILY
Qty: 14 CAPSULE | Refills: 0 | Status: SHIPPED | OUTPATIENT
Start: 2023-03-22 | End: 2023-03-29

## 2023-03-28 ENCOUNTER — OFFICE VISIT (OUTPATIENT)
Dept: ONCOLOGY | Facility: CLINIC | Age: 63
End: 2023-03-28
Payer: COMMERCIAL

## 2023-03-28 VITALS
OXYGEN SATURATION: 97 % | RESPIRATION RATE: 18 BRPM | DIASTOLIC BLOOD PRESSURE: 76 MMHG | TEMPERATURE: 95.1 F | BODY MASS INDEX: 37.95 KG/M2 | HEART RATE: 65 BPM | HEIGHT: 64 IN | WEIGHT: 222.3 LBS | SYSTOLIC BLOOD PRESSURE: 122 MMHG

## 2023-03-28 DIAGNOSIS — Z45.2 FITTING AND ADJUSTMENT OF VASCULAR CATHETER: ICD-10-CM

## 2023-03-28 DIAGNOSIS — C54.1 ENDOMETRIAL CARCINOMA: Primary | ICD-10-CM

## 2023-03-28 PROCEDURE — 99215 OFFICE O/P EST HI 40 MIN: CPT | Performed by: INTERNAL MEDICINE

## 2023-03-28 RX ORDER — HEPARIN SODIUM (PORCINE) LOCK FLUSH IV SOLN 100 UNIT/ML 100 UNIT/ML
500 SOLUTION INTRAVENOUS AS NEEDED
OUTPATIENT
Start: 2023-03-28

## 2023-03-28 RX ORDER — SODIUM CHLORIDE 0.9 % (FLUSH) 0.9 %
10 SYRINGE (ML) INJECTION AS NEEDED
Status: DISCONTINUED | OUTPATIENT
Start: 2023-03-28 | End: 2023-03-29 | Stop reason: HOSPADM

## 2023-03-28 RX ORDER — SODIUM CHLORIDE 0.9 % (FLUSH) 0.9 %
10 SYRINGE (ML) INJECTION AS NEEDED
OUTPATIENT
Start: 2023-03-28

## 2023-03-28 RX ORDER — HEPARIN SODIUM (PORCINE) LOCK FLUSH IV SOLN 100 UNIT/ML 100 UNIT/ML
500 SOLUTION INTRAVENOUS AS NEEDED
Status: DISCONTINUED | OUTPATIENT
Start: 2023-03-28 | End: 2023-03-29 | Stop reason: HOSPADM

## 2023-03-28 RX ADMIN — HEPARIN SODIUM (PORCINE) LOCK FLUSH IV SOLN 100 UNIT/ML 500 UNITS: 100 SOLUTION at 10:12

## 2023-03-28 RX ADMIN — Medication 10 ML: at 10:11

## 2023-03-30 NOTE — TELEPHONE ENCOUNTER
I think at this point she should establish with a dermatologist.  When I talked with Dr. Miller he said he could take a look as well, but he did not feel like it was radiation dermatitis this far out from when she had radiation.

## 2023-05-23 ENCOUNTER — INFUSION (OUTPATIENT)
Dept: ONCOLOGY | Facility: CLINIC | Age: 63
End: 2023-05-23
Payer: COMMERCIAL

## 2023-05-23 DIAGNOSIS — Z45.2 ENCOUNTER FOR CARE RELATED TO VASCULAR ACCESS PORT: Primary | ICD-10-CM

## 2023-05-23 RX ORDER — HEPARIN SODIUM (PORCINE) LOCK FLUSH IV SOLN 100 UNIT/ML 100 UNIT/ML
500 SOLUTION INTRAVENOUS AS NEEDED
Status: DISCONTINUED | OUTPATIENT
Start: 2023-05-23 | End: 2023-05-23 | Stop reason: HOSPADM

## 2023-05-23 RX ORDER — HEPARIN SODIUM (PORCINE) LOCK FLUSH IV SOLN 100 UNIT/ML 100 UNIT/ML
500 SOLUTION INTRAVENOUS AS NEEDED
OUTPATIENT
Start: 2023-05-23

## 2023-05-23 RX ORDER — SODIUM CHLORIDE 0.9 % (FLUSH) 0.9 %
10 SYRINGE (ML) INJECTION AS NEEDED
OUTPATIENT
Start: 2023-05-23

## 2023-05-23 RX ORDER — SODIUM CHLORIDE 0.9 % (FLUSH) 0.9 %
10 SYRINGE (ML) INJECTION AS NEEDED
Status: DISCONTINUED | OUTPATIENT
Start: 2023-05-23 | End: 2023-05-23 | Stop reason: HOSPADM

## 2023-05-23 RX ADMIN — HEPARIN SODIUM (PORCINE) LOCK FLUSH IV SOLN 100 UNIT/ML 500 UNITS: 100 SOLUTION at 10:05

## 2023-05-23 RX ADMIN — Medication 10 ML: at 10:05

## 2023-08-03 ENCOUNTER — OFFICE VISIT (OUTPATIENT)
Dept: ONCOLOGY | Facility: CLINIC | Age: 63
End: 2023-08-03
Payer: COMMERCIAL

## 2023-08-03 VITALS
DIASTOLIC BLOOD PRESSURE: 74 MMHG | BODY MASS INDEX: 37.22 KG/M2 | HEART RATE: 61 BPM | TEMPERATURE: 97.6 F | WEIGHT: 218 LBS | OXYGEN SATURATION: 98 % | RESPIRATION RATE: 18 BRPM | HEIGHT: 64 IN | SYSTOLIC BLOOD PRESSURE: 128 MMHG

## 2023-08-03 DIAGNOSIS — C54.1 ENDOMETRIAL CARCINOMA: Primary | ICD-10-CM

## 2023-08-03 RX ORDER — LANOLIN ALCOHOL/MO/W.PET/CERES
1000 CREAM (GRAM) TOPICAL DAILY
COMMUNITY

## 2023-08-20 NOTE — TELEPHONE ENCOUNTER
TREATMENT REACTION:  10:35 am:  Received call from Angela, [facility]. She calls to report patient Rachel Sierra New Start Carbo/Taxol  Premeds given, Angela states that the Taxol was started and patient was barely into her TXT when patient informed her that she was feeling chest tightness.  TXT was stopped  Nurse increased her fluids and symptoms seemed to dissipate. Nurse questions how to proceed?    Relayed all information to Dr Lima, he recommends:  1. Hold TXT today  2. Move TXT out to Wed with oral premeds starting 2 days prior to TXT day, and increase her IV Premeds   3. Unable to armaan TXT for Wed 5/18/22, TXT moved to Thursday 5/19/22 @ 7:45 am Angela notified    PREMED CHANGES:  DEXAMETHASONE 4 MG PO BID STARTING 2 DAYS PRIOR TO TXT, patient instructed to  prescription  IV PREMEDS INCREASED:  DEXAMETHASONE 20 MG IV  BENADRYL 50 MG IV  
show

## 2023-10-11 ENCOUNTER — TELEPHONE (OUTPATIENT)
Dept: GYNECOLOGIC ONCOLOGY | Facility: CLINIC | Age: 63
End: 2023-10-11
Payer: COMMERCIAL

## 2023-10-11 NOTE — TELEPHONE ENCOUNTER
I spoke to patient and informed her that Dr. Jay is no longer with [facility].    I explained that Angela Garnett (PA) reviewed her records and wanted to confirm that she is being followed closely by gynecology in [place]. Patient states she has recently moved to [place] and working to get established with gynecologist there.

## (undated) DEVICE — 3M™ STERI-STRIP™ REINFORCED ADHESIVE SKIN CLOSURES, R1547, 1/2 IN X 4 IN (12 MM X 100 MM), 6 STRIPS/ENVELOPE: Brand: 3M™ STERI-STRIP™

## (undated) DEVICE — SYR LL TP 10ML STRL

## (undated) DEVICE — GLV SURG BIOGEL LTX PF 5 1/2

## (undated) DEVICE — LAPAROSCOPIC SMOKE FILTRATION SYSTEM: Brand: PALL LAPAROSHIELD® PLUS LAPAROSCOPIC SMOKE FILTRATION SYSTEM

## (undated) DEVICE — CVR UNIV C/ARM

## (undated) DEVICE — TRAP FLD MINIVAC MEGADYNE 100ML

## (undated) DEVICE — PK TURNOVER RM ADV

## (undated) DEVICE — GLV SURG SENSICARE PI PF LF 7 GRN STRL

## (undated) DEVICE — PAD MINOR UNIVERSAL: Brand: MEDLINE INDUSTRIES, INC.

## (undated) DEVICE — VISUALIZATION SYSTEM: Brand: CLEARIFY

## (undated) DEVICE — SOL NACL 0.9PCT 1000ML

## (undated) DEVICE — DRSNG SURESITE WNDW 4X4.5

## (undated) DEVICE — SPNG GZ 2S 2X2 8PLY STRL PK/2

## (undated) DEVICE — ANTIBACTERIAL UNDYED BRAIDED (POLYGLACTIN 910), SYNTHETIC ABSORBABLE SUTURE: Brand: COATED VICRYL

## (undated) DEVICE — BLADELESS OBTURATOR: Brand: WECK VISTA

## (undated) DEVICE — TOWEL,OR,DSP,ST,BLUE,STD,4/PK,20PK/CS: Brand: MEDLINE

## (undated) DEVICE — GOWN,SIRUS,NON REINFRCD,LARGE,SET IN SL: Brand: MEDLINE

## (undated) DEVICE — VCARE MEDIUM, UTERINE MANIPULATOR, VAGINAL-CERVICAL-AHLUWALIA'S-RETRACTOR-ELEVATOR: Brand: VCARE

## (undated) DEVICE — SUT PROLN 2/0 SH 36IN 8523H

## (undated) DEVICE — SKIN PREP TRAY W/CHG: Brand: MEDLINE INDUSTRIES, INC.

## (undated) DEVICE — LOU LITHOTOMY ROBOTIC: Brand: MEDLINE INDUSTRIES, INC.

## (undated) DEVICE — CATHETER,FOLEY,100%SILICONE,16FR,10ML,LF: Brand: MEDLINE

## (undated) DEVICE — TBG PENCL TELESCP MEGADYNE SMOKE EVAC 10FT

## (undated) DEVICE — PATIENT RETURN ELECTRODE, SINGLE-USE, CONTACT QUALITY MONITORING, ADULT, WITH 9FT CORD, FOR PATIENTS WEIGING OVER 33LBS. (15KG): Brand: MEGADYNE

## (undated) DEVICE — DRSNG WND BORDR/ADHS NONADHR/GZ LF 2X2IN STRL

## (undated) DEVICE — ADHS LIQ MASTISOL 2/3ML

## (undated) DEVICE — DRAPE,UNDERBUTTOCKS,PCH,STERILE: Brand: MEDLINE

## (undated) DEVICE — GLV SURG SENSICARE POLYISPRN W/ALOE PF LF 6.5 GRN STRL

## (undated) DEVICE — TIP COVER ACCESSORY

## (undated) DEVICE — GLV SURG BIOGEL LTX PF 6 1/2

## (undated) DEVICE — SUT VIC 0/0 UR6 27IN DYED J603H

## (undated) DEVICE — VAGINAL PACKING: Brand: DEROYAL

## (undated) DEVICE — COLUMN DRAPE

## (undated) DEVICE — GLV SURG BIOGEL M LTX PF 7 1/2

## (undated) DEVICE — TOTAL TRAY, 16FR 10ML SIL FOLEY, URN: Brand: MEDLINE

## (undated) DEVICE — ENDOPATH XCEL BLADELESS TROCARS WITH STABILITY SLEEVES: Brand: ENDOPATH XCEL

## (undated) DEVICE — 4-PORT MANIFOLD: Brand: NEPTUNE 2

## (undated) DEVICE — Device: Brand: TISSUE RETRIEVAL SYSTEM

## (undated) DEVICE — APPL CHLORAPREP HI/LITE 26ML ORNG

## (undated) DEVICE — SOL ANTISTICK CAUTRY ELECTROLUBE LF

## (undated) DEVICE — ARM DRAPE

## (undated) DEVICE — GLV SURG BIOGEL LTX PF 6

## (undated) DEVICE — CANNULA SEAL

## (undated) DEVICE — DRAPE,CHEST,FENES,15X10,STERIL: Brand: MEDLINE

## (undated) DEVICE — SUT PDS 0 CT1 36IN Z346H

## (undated) DEVICE — APL DUPLOSPRAYER MIS 40CM

## (undated) DEVICE — SPNG LAP 18X18IN LF STRL PK/5